# Patient Record
Sex: MALE | Race: WHITE | Employment: OTHER | URBAN - METROPOLITAN AREA
[De-identification: names, ages, dates, MRNs, and addresses within clinical notes are randomized per-mention and may not be internally consistent; named-entity substitution may affect disease eponyms.]

---

## 2019-04-06 ENCOUNTER — HOSPITAL ENCOUNTER (OUTPATIENT)
Age: 64
Setting detail: OBSERVATION
Discharge: HOME | End: 2019-04-07
Attending: EMERGENCY MEDICINE | Admitting: SURGERY
Payer: MEDICARE

## 2019-04-06 ENCOUNTER — APPOINTMENT (OUTPATIENT)
Dept: GENERAL RADIOLOGY | Age: 64
End: 2019-04-06
Payer: MEDICARE

## 2019-04-06 ENCOUNTER — APPOINTMENT (OUTPATIENT)
Dept: CT IMAGING | Age: 64
End: 2019-04-06
Payer: MEDICARE

## 2019-04-06 DIAGNOSIS — T18.108A FOREIGN BODY IN ESOPHAGUS, INITIAL ENCOUNTER: Primary | ICD-10-CM

## 2019-04-06 LAB
ALBUMIN SERPL-MCNC: 3.9 G/DL (ref 3.5–5.2)
ALP BLD-CCNC: 57 U/L (ref 40–129)
ALT SERPL-CCNC: 18 U/L (ref 0–40)
ANION GAP SERPL CALCULATED.3IONS-SCNC: 9 MMOL/L (ref 7–16)
AST SERPL-CCNC: 18 U/L (ref 0–39)
BILIRUB SERPL-MCNC: 0.6 MG/DL (ref 0–1.2)
BUN BLDV-MCNC: 43 MG/DL (ref 8–23)
CALCIUM SERPL-MCNC: 8.8 MG/DL (ref 8.6–10.2)
CHLORIDE BLD-SCNC: 102 MMOL/L (ref 98–107)
CO2: 31 MMOL/L (ref 22–29)
CREAT SERPL-MCNC: 2.2 MG/DL (ref 0.7–1.2)
GFR AFRICAN AMERICAN: 37
GFR NON-AFRICAN AMERICAN: 31 ML/MIN/1.73
GLUCOSE BLD-MCNC: 99 MG/DL (ref 74–99)
HCT VFR BLD CALC: 41 % (ref 37–54)
HEMOGLOBIN: 13.5 G/DL (ref 12.5–16.5)
MCH RBC QN AUTO: 29.7 PG (ref 26–35)
MCHC RBC AUTO-ENTMCNC: 32.9 % (ref 32–34.5)
MCV RBC AUTO: 90.1 FL (ref 80–99.9)
PDW BLD-RTO: 14.6 FL (ref 11.5–15)
PLATELET # BLD: 163 E9/L (ref 130–450)
PMV BLD AUTO: 10.9 FL (ref 7–12)
POTASSIUM SERPL-SCNC: 3.7 MMOL/L (ref 3.5–5)
RBC # BLD: 4.55 E12/L (ref 3.8–5.8)
SODIUM BLD-SCNC: 142 MMOL/L (ref 132–146)
TOTAL PROTEIN: 6.8 G/DL (ref 6.4–8.3)
WBC # BLD: 8.6 E9/L (ref 4.5–11.5)

## 2019-04-06 PROCEDURE — 80053 COMPREHEN METABOLIC PANEL: CPT

## 2019-04-06 PROCEDURE — 85027 COMPLETE CBC AUTOMATED: CPT

## 2019-04-06 PROCEDURE — 70490 CT SOFT TISSUE NECK W/O DYE: CPT

## 2019-04-06 PROCEDURE — 99285 EMERGENCY DEPT VISIT HI MDM: CPT

## 2019-04-06 PROCEDURE — 36415 COLL VENOUS BLD VENIPUNCTURE: CPT

## 2019-04-06 PROCEDURE — 70360 X-RAY EXAM OF NECK: CPT

## 2019-04-06 PROCEDURE — 6370000000 HC RX 637 (ALT 250 FOR IP): Performed by: EMERGENCY MEDICINE

## 2019-04-06 PROCEDURE — 71045 X-RAY EXAM CHEST 1 VIEW: CPT

## 2019-04-06 RX ORDER — BENAZEPRIL HYDROCHLORIDE 40 MG/1
40 TABLET, FILM COATED ORAL DAILY
COMMUNITY

## 2019-04-06 RX ORDER — INDOMETHACIN 50 MG/1
50 CAPSULE ORAL 3 TIMES DAILY PRN
COMMUNITY
End: 2021-02-23

## 2019-04-06 RX ORDER — ALLOPURINOL 100 MG/1
100 TABLET ORAL DAILY
COMMUNITY
End: 2021-02-23 | Stop reason: ALTCHOICE

## 2019-04-06 RX ORDER — POTASSIUM CHLORIDE 20 MEQ/1
20 TABLET, EXTENDED RELEASE ORAL EVERY EVENING
COMMUNITY

## 2019-04-06 RX ORDER — FUROSEMIDE 20 MG/1
20 TABLET ORAL EVERY EVENING
COMMUNITY

## 2019-04-06 RX ORDER — AMLODIPINE BESYLATE 10 MG/1
10 TABLET ORAL EVERY EVENING
COMMUNITY

## 2019-04-06 RX ORDER — DICYCLOMINE HCL 20 MG
40 TABLET ORAL 3 TIMES DAILY
COMMUNITY

## 2019-04-06 RX ORDER — TOPIRAMATE 25 MG/1
25 TABLET ORAL EVERY EVENING
COMMUNITY

## 2019-04-06 RX ORDER — GABAPENTIN 300 MG/1
300 CAPSULE ORAL 3 TIMES DAILY PRN
COMMUNITY

## 2019-04-06 RX ORDER — ACETAMINOPHEN 160 MG
1 TABLET,DISINTEGRATING ORAL EVERY EVENING
COMMUNITY

## 2019-04-06 RX ORDER — SUMATRIPTAN 25 MG/1
25 TABLET, FILM COATED ORAL 2 TIMES DAILY PRN
COMMUNITY

## 2019-04-06 RX ORDER — PROPRANOLOL HYDROCHLORIDE AND HYDROCHLOROTHIAZIDE 40; 25 MG/1; MG/1
1 TABLET ORAL EVERY EVENING
COMMUNITY
End: 2021-05-17 | Stop reason: ALTCHOICE

## 2019-04-06 RX ORDER — VITAMIN B COMPLEX
1 TABLET ORAL EVERY EVENING
COMMUNITY

## 2019-04-06 RX ADMIN — LIDOCAINE HYDROCHLORIDE: 20 SOLUTION ORAL; TOPICAL at 21:56

## 2019-04-06 SDOH — HEALTH STABILITY: MENTAL HEALTH: HOW OFTEN DO YOU HAVE A DRINK CONTAINING ALCOHOL?: NEVER

## 2019-04-06 ASSESSMENT — PAIN DESCRIPTION - DESCRIPTORS: DESCRIPTORS: SHARP

## 2019-04-06 ASSESSMENT — PAIN DESCRIPTION - LOCATION: LOCATION: THROAT

## 2019-04-06 ASSESSMENT — PAIN SCALES - GENERAL: PAINLEVEL_OUTOF10: 6

## 2019-04-06 ASSESSMENT — PAIN DESCRIPTION - FREQUENCY: FREQUENCY: INTERMITTENT

## 2019-04-06 ASSESSMENT — PAIN DESCRIPTION - PAIN TYPE: TYPE: ACUTE PAIN

## 2019-04-06 NOTE — ED PROVIDER NOTES
HPI:   Princess Wilcox is a 66 y.o. male presenting to the ED for swallowed foreign object, beginning yesterday evening. The complaint has been persistent, moderate in severity, and worsened by nothing. Pt reports that he was eating chicken yesterday and may have swallowed a bone. Since eating yesterday, he has had a difficult time swallowing. He was able to swallow his pills today but was unable to swallow a strawberry or piece of pineapple. He reports of pharyngitis. Pt denies LOC, HA, SOB, CP, back pain, neck pain, n/v/d, fever, chills, dizziness, coughing, abdominal pain or any other general complaints. ROS:   Pertinent positives and negatives are stated within HPI, all other systems reviewed and are negative.    --------------------------------------------- PAST HISTORY ---------------------------------------------  Past Medical History:  has no past medical history on file. Past Surgical History:  has no past surgical history on file. Social History:  reports that he has never smoked. He does not have any smokeless tobacco history on file. He reports that he does not drink alcohol. Family History: family history is not on file. The patients home medications have been reviewed. Allergies: Patient has no known allergies.     -------------------------------------------------- RESULTS -------------------------------------------------  All laboratory and radiology results have been personally reviewed by myself   LABS:  Results for orders placed or performed during the hospital encounter of 04/06/19   CBC   Result Value Ref Range    WBC 8.6 4.5 - 11.5 E9/L    RBC 4.55 3.80 - 5.80 E12/L    Hemoglobin 13.5 12.5 - 16.5 g/dL    Hematocrit 41.0 37.0 - 54.0 %    MCV 90.1 80.0 - 99.9 fL    MCH 29.7 26.0 - 35.0 pg    MCHC 32.9 32.0 - 34.5 %    RDW 14.6 11.5 - 15.0 fL    Platelets 398 803 - 295 E9/L    MPV 10.9 7.0 - 12.0 fL   Comprehensive Metabolic Panel   Result Value Ref Range    Sodium 142 132 - 146 mmol/L    Potassium 3.7 3.5 - 5.0 mmol/L    Chloride 102 98 - 107 mmol/L    CO2 31 (H) 22 - 29 mmol/L    Anion Gap 9 7 - 16 mmol/L    Glucose 99 74 - 99 mg/dL    BUN 43 (H) 8 - 23 mg/dL    CREATININE 2.2 (H) 0.7 - 1.2 mg/dL    GFR Non-African American 31 >=60 mL/min/1.73    GFR African American 37     Calcium 8.8 8.6 - 10.2 mg/dL    Total Protein 6.8 6.4 - 8.3 g/dL    Alb 3.9 3.5 - 5.2 g/dL    Total Bilirubin 0.6 0.0 - 1.2 mg/dL    Alkaline Phosphatase 57 40 - 129 U/L    ALT 18 0 - 40 U/L    AST 18 0 - 39 U/L       RADIOLOGY:  Interpreted by Radiologist.  CT SOFT TISSUE NECK WO CONTRAST   Final Result   1. Presence of 2.7 cm x 0.1 millimeters   linear bone density representing a foreign body transverse oriented in   the cervical esophagus. Preliminary report given to Dr. Kami Elam ER Physician at the time of   the present study. ABNORMAL REPORT. XR Neck Soft Tissue   Final Result   Unremarkable x-ray series of the soft tissues of the neck. XR CHEST PORTABLE   Final Result   No acute cardiopulmonary process. ------------------------- NURSING NOTES AND VITALS REVIEWED ---------------------------   The nursing notes within the ED encounter and vital signs as below have been reviewed. /83   Pulse 71   Temp 97.9 °F (36.6 °C) (Temporal)   Resp 16   Ht 5' 7\" (1.702 m)   Wt 280 lb (127 kg)   SpO2 96%   BMI 43.85 kg/m²   Oxygen Saturation Interpretation: Normal    ---------------------------------------------------PHYSICAL EXAM--------------------------------------    Constitutional/General: Alert and oriented x3, Mild distress  Eyes: PERRL, EOMI  Mouth: Oropharynx clear, handling secretions, no trismus  Neck: Supple, full ROM,   Pulmonary: Lungs clear to auscultation bilaterally, no wheezes, rales, or rhonchi. Not in respiratory distress  Cardiovascular:  Regular rate and rhythm, no murmurs, gallops, or rubs.  2+ distal pulses  Abdomen: Soft, non tender, non distended, Extremities: Moves all extremities x 4. Warm and well perfused  Skin: warm and dry  Neurologic: GCS 15,  Psych: Normal Affect      ------------------------------ ED COURSE/MEDICAL DECISION MAKING----------------------  Medications   aluminum & magnesium hydroxide-simethicone (MAALOX) 30 mL, lidocaine viscous (XYLOCAINE) 5 mL (GI COCKTAIL) ( Oral Given 4/6/19 2156)   morphine (PF) injection 2 mg (2 mg Intravenous Given 4/7/19 0022)   ondansetron (ZOFRAN) injection 4 mg (4 mg Intravenous Given 4/7/19 0022)       Medical Decision Making:     medicated and still symptomatic and CT ordered and reviewed the CT reviewed with patient and discussed with general surgery patient will be admitted    Counseling: The emergency provider has spoken with the patient and discussed todays results, in addition to providing specific details for the plan of care and counseling regarding the diagnosis and prognosis. Questions are answered at this time and they are agreeable with the plan.      --------------------------------- IMPRESSION AND DISPOSITION ---------------------------------    IMPRESSION  1. Foreign body in esophagus, initial encounter        DISPOSITION  Disposition: Admit to med/surg floor  Patient condition is stable    4/6/19, 7:39 PM.    This note is prepared by Lyn Sawyer, acting as Scribe for Laurie Mcdonnell MD.    Laurie Mcdonnell MD:  The scribe's documentation has been prepared under my direction and personally reviewed by me in its entirety. I confirm that the note above accurately reflects all work, treatment, procedures, and medical decision making performed by me.          Laurie Mcdonnell MD  04/07/19 [id number]

## 2019-04-07 ENCOUNTER — ANESTHESIA EVENT (OUTPATIENT)
Dept: OPERATING ROOM | Age: 64
End: 2019-04-07
Payer: MEDICARE

## 2019-04-07 ENCOUNTER — ANESTHESIA (OUTPATIENT)
Dept: OPERATING ROOM | Age: 64
End: 2019-04-07
Payer: MEDICARE

## 2019-04-07 VITALS
RESPIRATION RATE: 25 BRPM | SYSTOLIC BLOOD PRESSURE: 185 MMHG | OXYGEN SATURATION: 97 % | DIASTOLIC BLOOD PRESSURE: 123 MMHG

## 2019-04-07 VITALS
DIASTOLIC BLOOD PRESSURE: 86 MMHG | SYSTOLIC BLOOD PRESSURE: 139 MMHG | RESPIRATION RATE: 16 BRPM | HEIGHT: 67 IN | BODY MASS INDEX: 43.95 KG/M2 | HEART RATE: 84 BPM | TEMPERATURE: 99 F | WEIGHT: 280 LBS | OXYGEN SATURATION: 92 %

## 2019-04-07 PROBLEM — T18.108A FOREIGN BODY IN ESOPHAGUS: Status: ACTIVE | Noted: 2019-04-07

## 2019-04-07 LAB
ALBUMIN SERPL-MCNC: 3.9 G/DL (ref 3.5–5.2)
ALP BLD-CCNC: 85 U/L (ref 40–129)
ALT SERPL-CCNC: 34 U/L (ref 0–40)
ANION GAP SERPL CALCULATED.3IONS-SCNC: 13 MMOL/L (ref 7–16)
AST SERPL-CCNC: 46 U/L (ref 0–39)
BASOPHILS ABSOLUTE: 0.05 E9/L (ref 0–0.2)
BASOPHILS RELATIVE PERCENT: 0.5 % (ref 0–2)
BILIRUB SERPL-MCNC: 0.9 MG/DL (ref 0–1.2)
BUN BLDV-MCNC: 38 MG/DL (ref 8–23)
CALCIUM SERPL-MCNC: 9.1 MG/DL (ref 8.6–10.2)
CHLORIDE BLD-SCNC: 102 MMOL/L (ref 98–107)
CO2: 28 MMOL/L (ref 22–29)
CREAT SERPL-MCNC: 2 MG/DL (ref 0.7–1.2)
EOSINOPHILS ABSOLUTE: 0.12 E9/L (ref 0.05–0.5)
EOSINOPHILS RELATIVE PERCENT: 1.1 % (ref 0–6)
GFR AFRICAN AMERICAN: 41
GFR NON-AFRICAN AMERICAN: 34 ML/MIN/1.73
GLUCOSE BLD-MCNC: 99 MG/DL (ref 74–99)
HCT VFR BLD CALC: 41.2 % (ref 37–54)
HEMOGLOBIN: 13.3 G/DL (ref 12.5–16.5)
IMMATURE GRANULOCYTES #: 0.04 E9/L
IMMATURE GRANULOCYTES %: 0.4 % (ref 0–5)
LYMPHOCYTES ABSOLUTE: 0.88 E9/L (ref 1.5–4)
LYMPHOCYTES RELATIVE PERCENT: 8.4 % (ref 20–42)
MCH RBC QN AUTO: 29.3 PG (ref 26–35)
MCHC RBC AUTO-ENTMCNC: 32.3 % (ref 32–34.5)
MCV RBC AUTO: 90.7 FL (ref 80–99.9)
MONOCYTES ABSOLUTE: 0.77 E9/L (ref 0.1–0.95)
MONOCYTES RELATIVE PERCENT: 7.3 % (ref 2–12)
NEUTROPHILS ABSOLUTE: 8.67 E9/L (ref 1.8–7.3)
NEUTROPHILS RELATIVE PERCENT: 82.3 % (ref 43–80)
PDW BLD-RTO: 14.5 FL (ref 11.5–15)
PLATELET # BLD: 157 E9/L (ref 130–450)
PMV BLD AUTO: 11.6 FL (ref 7–12)
POTASSIUM REFLEX MAGNESIUM: 4 MMOL/L (ref 3.5–5)
RBC # BLD: 4.54 E12/L (ref 3.8–5.8)
SODIUM BLD-SCNC: 143 MMOL/L (ref 132–146)
TOTAL PROTEIN: 6.7 G/DL (ref 6.4–8.3)
WBC # BLD: 10.5 E9/L (ref 4.5–11.5)

## 2019-04-07 PROCEDURE — 2580000003 HC RX 258: Performed by: STUDENT IN AN ORGANIZED HEALTH CARE EDUCATION/TRAINING PROGRAM

## 2019-04-07 PROCEDURE — 3600007501: Performed by: SURGERY

## 2019-04-07 PROCEDURE — G0378 HOSPITAL OBSERVATION PER HR: HCPCS

## 2019-04-07 PROCEDURE — 80053 COMPREHEN METABOLIC PANEL: CPT

## 2019-04-07 PROCEDURE — 3700000000 HC ANESTHESIA ATTENDED CARE: Performed by: SURGERY

## 2019-04-07 PROCEDURE — 96374 THER/PROPH/DIAG INJ IV PUSH: CPT

## 2019-04-07 PROCEDURE — 6360000002 HC RX W HCPCS: Performed by: SURGERY

## 2019-04-07 PROCEDURE — 36415 COLL VENOUS BLD VENIPUNCTURE: CPT

## 2019-04-07 PROCEDURE — 6360000002 HC RX W HCPCS

## 2019-04-07 PROCEDURE — 2709999900 HC NON-CHARGEABLE SUPPLY: Performed by: SURGERY

## 2019-04-07 PROCEDURE — 6360000002 HC RX W HCPCS: Performed by: NURSE ANESTHETIST, CERTIFIED REGISTERED

## 2019-04-07 PROCEDURE — 96375 TX/PRO/DX INJ NEW DRUG ADDON: CPT

## 2019-04-07 PROCEDURE — 3600007511: Performed by: SURGERY

## 2019-04-07 PROCEDURE — 85025 COMPLETE CBC W/AUTO DIFF WBC: CPT

## 2019-04-07 PROCEDURE — 6360000002 HC RX W HCPCS: Performed by: EMERGENCY MEDICINE

## 2019-04-07 PROCEDURE — 7100000000 HC PACU RECOVERY - FIRST 15 MIN: Performed by: SURGERY

## 2019-04-07 PROCEDURE — 3700000001 HC ADD 15 MINUTES (ANESTHESIA): Performed by: SURGERY

## 2019-04-07 PROCEDURE — 2500000003 HC RX 250 WO HCPCS: Performed by: NURSE ANESTHETIST, CERTIFIED REGISTERED

## 2019-04-07 PROCEDURE — 2580000003 HC RX 258: Performed by: SURGERY

## 2019-04-07 PROCEDURE — 88300 SURGICAL PATH GROSS: CPT

## 2019-04-07 PROCEDURE — 7100000001 HC PACU RECOVERY - ADDTL 15 MIN: Performed by: SURGERY

## 2019-04-07 RX ORDER — DICYCLOMINE HYDROCHLORIDE 10 MG/ML
20 INJECTION INTRAMUSCULAR ONCE
Status: COMPLETED | OUTPATIENT
Start: 2019-04-07 | End: 2019-04-07

## 2019-04-07 RX ORDER — PROPOFOL 10 MG/ML
INJECTION, EMULSION INTRAVENOUS PRN
Status: DISCONTINUED | OUTPATIENT
Start: 2019-04-07 | End: 2019-04-07 | Stop reason: SDUPTHER

## 2019-04-07 RX ORDER — ONDANSETRON 2 MG/ML
INJECTION INTRAMUSCULAR; INTRAVENOUS
Status: COMPLETED
Start: 2019-04-07 | End: 2019-04-07

## 2019-04-07 RX ORDER — SODIUM CHLORIDE, SODIUM LACTATE, POTASSIUM CHLORIDE, CALCIUM CHLORIDE 600; 310; 30; 20 MG/100ML; MG/100ML; MG/100ML; MG/100ML
INJECTION, SOLUTION INTRAVENOUS CONTINUOUS
Status: DISCONTINUED | OUTPATIENT
Start: 2019-04-07 | End: 2019-04-07

## 2019-04-07 RX ORDER — MORPHINE SULFATE 2 MG/ML
2 INJECTION, SOLUTION INTRAMUSCULAR; INTRAVENOUS ONCE
Status: COMPLETED | OUTPATIENT
Start: 2019-04-07 | End: 2019-04-07

## 2019-04-07 RX ORDER — ONDANSETRON 2 MG/ML
4 INJECTION INTRAMUSCULAR; INTRAVENOUS ONCE
Status: COMPLETED | OUTPATIENT
Start: 2019-04-07 | End: 2019-04-07

## 2019-04-07 RX ORDER — SODIUM CHLORIDE 0.9 % (FLUSH) 0.9 %
10 SYRINGE (ML) INJECTION PRN
Status: DISCONTINUED | OUTPATIENT
Start: 2019-04-07 | End: 2019-04-07 | Stop reason: HOSPADM

## 2019-04-07 RX ORDER — LABETALOL HYDROCHLORIDE 5 MG/ML
5 INJECTION, SOLUTION INTRAVENOUS EVERY 10 MIN PRN
Status: DISCONTINUED | OUTPATIENT
Start: 2019-04-07 | End: 2019-04-07 | Stop reason: HOSPADM

## 2019-04-07 RX ORDER — MORPHINE SULFATE 2 MG/ML
2 INJECTION, SOLUTION INTRAMUSCULAR; INTRAVENOUS EVERY 5 MIN PRN
Status: DISCONTINUED | OUTPATIENT
Start: 2019-04-07 | End: 2019-04-07 | Stop reason: HOSPADM

## 2019-04-07 RX ORDER — ACETAMINOPHEN 325 MG/1
650 TABLET ORAL EVERY 4 HOURS PRN
Status: DISCONTINUED | OUTPATIENT
Start: 2019-04-07 | End: 2019-04-07 | Stop reason: HOSPADM

## 2019-04-07 RX ORDER — MEPERIDINE HYDROCHLORIDE 50 MG/ML
12.5 INJECTION INTRAMUSCULAR; INTRAVENOUS; SUBCUTANEOUS EVERY 5 MIN PRN
Status: DISCONTINUED | OUTPATIENT
Start: 2019-04-07 | End: 2019-04-07 | Stop reason: HOSPADM

## 2019-04-07 RX ORDER — ROCURONIUM BROMIDE 10 MG/ML
INJECTION, SOLUTION INTRAVENOUS PRN
Status: DISCONTINUED | OUTPATIENT
Start: 2019-04-07 | End: 2019-04-07 | Stop reason: SDUPTHER

## 2019-04-07 RX ORDER — DEXAMETHASONE SODIUM PHOSPHATE 10 MG/ML
INJECTION INTRAMUSCULAR; INTRAVENOUS PRN
Status: DISCONTINUED | OUTPATIENT
Start: 2019-04-07 | End: 2019-04-07 | Stop reason: SDUPTHER

## 2019-04-07 RX ORDER — ONDANSETRON 2 MG/ML
INJECTION INTRAMUSCULAR; INTRAVENOUS PRN
Status: DISCONTINUED | OUTPATIENT
Start: 2019-04-07 | End: 2019-04-07 | Stop reason: SDUPTHER

## 2019-04-07 RX ORDER — DICYCLOMINE HYDROCHLORIDE 10 MG/ML
20 INJECTION INTRAMUSCULAR 4 TIMES DAILY
Status: DISCONTINUED | OUTPATIENT
Start: 2019-04-07 | End: 2019-04-07 | Stop reason: HOSPADM

## 2019-04-07 RX ORDER — SODIUM CHLORIDE 0.9 % (FLUSH) 0.9 %
10 SYRINGE (ML) INJECTION EVERY 12 HOURS SCHEDULED
Status: DISCONTINUED | OUTPATIENT
Start: 2019-04-07 | End: 2019-04-07 | Stop reason: HOSPADM

## 2019-04-07 RX ORDER — SUCCINYLCHOLINE/SOD CL,ISO/PF 200MG/10ML
SYRINGE (ML) INTRAVENOUS PRN
Status: DISCONTINUED | OUTPATIENT
Start: 2019-04-07 | End: 2019-04-07 | Stop reason: SDUPTHER

## 2019-04-07 RX ORDER — MORPHINE SULFATE 2 MG/ML
INJECTION, SOLUTION INTRAMUSCULAR; INTRAVENOUS
Status: COMPLETED
Start: 2019-04-07 | End: 2019-04-07

## 2019-04-07 RX ORDER — HYDRALAZINE HYDROCHLORIDE 20 MG/ML
5 INJECTION INTRAMUSCULAR; INTRAVENOUS EVERY 10 MIN PRN
Status: DISCONTINUED | OUTPATIENT
Start: 2019-04-07 | End: 2019-04-07 | Stop reason: HOSPADM

## 2019-04-07 RX ORDER — ONDANSETRON 2 MG/ML
4 INJECTION INTRAMUSCULAR; INTRAVENOUS EVERY 6 HOURS PRN
Status: DISCONTINUED | OUTPATIENT
Start: 2019-04-07 | End: 2019-04-07 | Stop reason: HOSPADM

## 2019-04-07 RX ORDER — PROMETHAZINE HYDROCHLORIDE 25 MG/ML
6.25 INJECTION, SOLUTION INTRAMUSCULAR; INTRAVENOUS
Status: DISCONTINUED | OUTPATIENT
Start: 2019-04-07 | End: 2019-04-07 | Stop reason: HOSPADM

## 2019-04-07 RX ORDER — MIDAZOLAM HYDROCHLORIDE 1 MG/ML
INJECTION INTRAMUSCULAR; INTRAVENOUS PRN
Status: DISCONTINUED | OUTPATIENT
Start: 2019-04-07 | End: 2019-04-07 | Stop reason: SDUPTHER

## 2019-04-07 RX ADMIN — MIDAZOLAM HYDROCHLORIDE 2 MG: 1 INJECTION, SOLUTION INTRAMUSCULAR; INTRAVENOUS at 07:44

## 2019-04-07 RX ADMIN — Medication 10 ML: at 09:46

## 2019-04-07 RX ADMIN — ONDANSETRON HYDROCHLORIDE 4 MG: 2 SOLUTION INTRAMUSCULAR; INTRAVENOUS at 00:22

## 2019-04-07 RX ADMIN — ONDANSETRON 4 MG: 2 INJECTION INTRAMUSCULAR; INTRAVENOUS at 00:22

## 2019-04-07 RX ADMIN — DICYCLOMINE HYDROCHLORIDE 20 MG: 20 INJECTION, SOLUTION INTRAMUSCULAR at 14:12

## 2019-04-07 RX ADMIN — DICYCLOMINE HYDROCHLORIDE 20 MG: 20 INJECTION, SOLUTION INTRAMUSCULAR at 01:21

## 2019-04-07 RX ADMIN — MORPHINE SULFATE 2 MG: 2 INJECTION, SOLUTION INTRAMUSCULAR; INTRAVENOUS at 00:22

## 2019-04-07 RX ADMIN — LIDOCAINE HYDROCHLORIDE 100 MG: 20 INJECTION, SOLUTION INTRAVENOUS at 07:44

## 2019-04-07 RX ADMIN — ONDANSETRON HYDROCHLORIDE 4 MG: 2 INJECTION, SOLUTION INTRAMUSCULAR; INTRAVENOUS at 07:44

## 2019-04-07 RX ADMIN — ROCURONIUM BROMIDE 5 MG: 10 INJECTION, SOLUTION INTRAVENOUS at 07:44

## 2019-04-07 RX ADMIN — PROPOFOL 200 MG: 10 INJECTION, EMULSION INTRAVENOUS at 07:44

## 2019-04-07 RX ADMIN — SODIUM CHLORIDE, POTASSIUM CHLORIDE, SODIUM LACTATE AND CALCIUM CHLORIDE: 600; 310; 30; 20 INJECTION, SOLUTION INTRAVENOUS at 02:31

## 2019-04-07 RX ADMIN — DICYCLOMINE HYDROCHLORIDE 20 MG: 20 INJECTION, SOLUTION INTRAMUSCULAR at 09:45

## 2019-04-07 RX ADMIN — Medication 140 MG: at 07:44

## 2019-04-07 RX ADMIN — DEXAMETHASONE SODIUM PHOSPHATE 10 MG: 10 INJECTION INTRAMUSCULAR; INTRAVENOUS at 07:44

## 2019-04-07 ASSESSMENT — PAIN SCALES - GENERAL
PAINLEVEL_OUTOF10: 0
PAINLEVEL_OUTOF10: 4
PAINLEVEL_OUTOF10: 6
PAINLEVEL_OUTOF10: 0

## 2019-04-07 ASSESSMENT — PULMONARY FUNCTION TESTS
PIF_VALUE: 30
PIF_VALUE: 31
PIF_VALUE: 1
PIF_VALUE: 22
PIF_VALUE: 25
PIF_VALUE: 26
PIF_VALUE: 3
PIF_VALUE: 0
PIF_VALUE: 3
PIF_VALUE: 41
PIF_VALUE: 1
PIF_VALUE: 11
PIF_VALUE: 21
PIF_VALUE: 18
PIF_VALUE: 23
PIF_VALUE: 3
PIF_VALUE: 3
PIF_VALUE: 24
PIF_VALUE: 2
PIF_VALUE: 42
PIF_VALUE: 2

## 2019-04-07 NOTE — PLAN OF CARE
Problem: Pain:  Goal: Pain level will decrease  Description  Pain level will decrease  Outcome: Met This Shift  Goal: Control of acute pain  Description  Control of acute pain  Outcome: Met This Shift  Goal: Control of chronic pain  Description  Control of chronic pain  Outcome: Met This Shift     Problem: Falls - Risk of:  Goal: Will remain free from falls  Description  Will remain free from falls  Outcome: Met This Shift  Goal: Absence of physical injury  Description  Absence of physical injury  Outcome: Met This Shift

## 2019-04-07 NOTE — OP NOTE
Tuan Wyatt  YOB: 1955  [id number]    Pre-operative Diagnosis: esophageal foreign body    Post-operative Diagnosis: same    Procedure: EGD with removal foreign body    Anesthesia: JOANN    Surgeon: Adria Cooper MD    Assistant: None    Estimated Blood Loss: minimal    Complications: none    Specimens: none    Procedure:  Pt was taken to the OR and placed on the endoscopy table in a left lateral decubitus position. General anesthesia was administered and a bite block was inserted. A lubricated gastroscope was inserted into the oropharynx and advanced into the esophagus. In the proximal cervical esophagus was a chicken bone. The end of the bone was grasped with a rat tooth forceps, and the scope was retrieved along with the foreign body. The scope was reinserted. No other pathology was noted in the esophagus, stomach or duodenum. No crepitus could be palpated in the cervical soft tissue. The scope was removed. The patient tolerated the procedure well.     Impression: successful removal bony foreign body    Plan:start PO, discharge if tolerates      Collette Ruvalcaba MD

## 2019-04-07 NOTE — ED NOTES
Pt state he came to ER because he thinks that he has a chicken bone stuck in his throat. Pt is currently able to swallow. No difficulty breathing, no drooling noted. Pt states pain is minimal, but increases in intensity when he swallows. Respirations are currently easy and unlabored. No signs of distress noted at this time. Family is at bedside. Will continue to monitor.      Vielka Hansen RN  04/07/19 [id number]

## 2019-04-07 NOTE — ANESTHESIA PRE PROCEDURE
Department of Anesthesiology  Preprocedure Note       Name:  Khloe Liu   Age:  66 y.o.  :  1955                                          MRN:  [id number]         Date:  2019      Surgeon: Ethel Seay):  Cinthya Hairston MD    Procedure: EGD FOREIGN BODY REMOVAL (N/A )    Medications prior to admission:   Prior to Admission medications    Medication Sig Start Date End Date Taking? Authorizing Provider   dicyclomine (BENTYL) 20 MG tablet Take 20 mg by mouth every 4 hours as needed   Yes Historical Provider, MD   gabapentin (NEURONTIN) 300 MG capsule Take 300 mg by mouth 4 times daily.    Yes Historical Provider, MD   propranolol-hydrochlorothiazide (INDERIDE) 40-25 MG per tablet Take 1 tablet by mouth daily   Yes Historical Provider, MD   amLODIPine (NORVASC) 10 MG tablet Take 10 mg by mouth daily   Yes Historical Provider, MD   benazepril (LOTENSIN) 40 MG tablet Take 40 mg by mouth daily   Yes Historical Provider, MD   furosemide (LASIX) 20 MG tablet Take 20 mg by mouth daily   Yes Historical Provider, MD   topiramate (TOPAMAX) 25 MG tablet Take 25 mg by mouth daily   Yes Historical Provider, MD   SUMAtriptan (IMITREX) 25 MG tablet Take 25 mg by mouth 2 times daily as needed for Migraine   Yes Historical Provider, MD   allopurinol (ZYLOPRIM) 100 MG tablet Take 100 mg by mouth daily   Yes Historical Provider, MD   indomethacin (INDOCIN) 50 MG capsule Take 50 mg by mouth 3 times daily as needed for Pain   Yes Historical Provider, MD   potassium chloride (KLOR-CON M) 20 MEQ extended release tablet Take 20 mEq by mouth daily   Yes Historical Provider, MD   Cholecalciferol (VITAMIN D3) 2000 units CAPS Take 1 capsule by mouth daily   Yes Historical Provider, MD   Coenzyme Q10 (COQ10) 100 MG CAPS Take 1 capsule by mouth daily   Yes Historical Provider, MD       Current medications:    Current Facility-Administered Medications   Medication Dose Route Frequency Provider Last Rate Last Dose    sodium chloride flush 0.9 % injection 10 mL  10 mL Intravenous 2 times per day Jonathan Dash MD        sodium chloride flush 0.9 % injection 10 mL  10 mL Intravenous PRN Jonathan Dash MD        acetaminophen (TYLENOL) tablet 650 mg  650 mg Oral Q4H PRN Jonathan Dash MD        enoxaparin (LOVENOX) injection 40 mg  40 mg Subcutaneous Daily Jonathan Dash MD        lactated ringers infusion   Intravenous Continuous Jonathan Dash  mL/hr at 04/07/19 0231      HYDROmorphone (DILAUDID) injection 0.5 mg  0.5 mg Intravenous Q3H PRN Jonathan Dash MD        [facility]ron [facility] injection 4 mg  4 mg Intravenous Q6H PRN Jonathan Dash MD        dicyclomine (BENTYL) injection 20 mg  20 mg Intramuscular 4x Daily Jonathan Dash MD           Allergies:  No Known Allergies    Problem List:    Patient Active Problem List   Diagnosis Code    Foreign body in esophagus T18.108A       Past Medical History:  History reviewed. No pertinent past medical history. Past Surgical History:  History reviewed. No pertinent surgical history.     Social History:    Social History     Tobacco Use    Smoking status: Never Smoker   Substance Use Topics    Alcohol use: Never     Frequency: Never                                Counseling given: Not Answered      Vital Signs (Current):   Vitals:    04/06/19 1938 04/07/19 0032 04/07/19 0200   BP: (!) 155/96 121/83 138/74   Pulse: 79 71 80   Resp: 18 16 18   Temp: 36.6 °C (97.9 °F) 36.7 °C (98 °F) 36.8 °C (98.2 °F)   TempSrc: Temporal  Temporal   SpO2: 98% 96% 96%   Weight: 280 lb (127 kg)     Height: 5' 7\" (1.702 m)                                                BP Readings from Last 3 Encounters:   04/07/19 138/74       NPO Status: Time of last liquid consumption: 2300                        Time of last solid consumption: 2300                        Date of last liquid consumption: 04/06/19                        Date of last solid food consumption: 04/06/19    BMI:   Wt Readings from Last 3 Encounters:   04/06/19 280 lb (127 kg)     Body mass index is 43.85 kg/m². CBC:   Lab Results   Component Value Date    WBC 8.6 04/06/2019    RBC 4.55 04/06/2019    HGB 13.5 04/06/2019    HCT 41.0 04/06/2019    MCV 90.1 04/06/2019    RDW 14.6 04/06/2019     04/06/2019       CMP:   Lab Results   Component Value Date     04/06/2019    K 3.7 04/06/2019     04/06/2019    CO2 31 04/06/2019    BUN 43 04/06/2019    CREATININE 2.2 04/06/2019    GFRAA 37 04/06/2019    LABGLOM 31 04/06/2019    GLUCOSE 99 04/06/2019    PROT 6.8 04/06/2019    CALCIUM 8.8 04/06/2019    BILITOT 0.6 04/06/2019    ALKPHOS 57 04/06/2019    AST 18 04/06/2019    ALT 18 04/06/2019       POC Tests: No results for input(s): POCGLU, POCNA, POCK, POCCL, POCBUN, POCHEMO, POCHCT in the last 72 hours. Coags: No results found for: PROTIME, INR, APTT    HCG (If Applicable): No results found for: PREGTESTUR, PREGSERUM, HCG, HCGQUANT     ABGs: No results found for: PHART, PO2ART, WGR6ZLZ, QTZ2STQ, BEART, H6BGRJVJ     Type & Screen (If Applicable):  No results found for: LABABO, LABRH     CT Soft tissue neck 4/6/2019  Impression   1. Presence of 2.7 cm x 0.1 millimeters   linear bone density representing a foreign body transverse oriented in   the cervical esophagus.       Preliminary report given to Dr. Pauly Delgado ER Physician at the time of   the present study.       ABNORMAL REPORT. XR Chest 4/6/2019      Impression   No acute cardiopulmonary process.          Anesthesia Evaluation  Patient summary reviewed and Nursing notes reviewed no history of anesthetic complications:   Airway: Mallampati: II  TM distance: <3 FB   Neck ROM: full  Mouth opening: > = 3 FB Dental:      Comment: Pt denies any missing, chipped, loose, or cracked teeth    Pulmonary:normal exam  breath sounds clear to auscultation  (+) sleep apnea: on noncompliant,                             Cardiovascular:    (+) hypertension:,         Rhythm: regular  Rate: normal           Beta Blocker:  Dose within 24 Hrs         Neuro/Psych:   (+) headaches (pt takes PRN imitrex): migraine headaches,             GI/Hepatic/Renal:   (+) renal disease (pt states was told has mild kidney disease):,          ROS comment: Foreign body in esophagus. Endo/Other: Negative Endo/Other ROS             Pt had no PAT visit       Abdominal:           Vascular: negative vascular ROS. Anesthesia Plan      general     ASA 3       Induction: intravenous. MIPS: Prophylactic antiemetics administered and Postoperative trial extubation. Anesthetic plan and risks discussed with patient. Use of blood products discussed with patient whom consented to blood products. Plan discussed with CRNA and attending. Josafat Mccloud RN , [facility]  4/7/2019      DOS STAFF ADDENDUM:    Patient seen and chart reviewed. Physical exam and history updated as indicated. NPO status confirmed. Anesthesia options and plan discussed including risks benefits with patient/legal guardian and family as available. Concerns and questions addressed. Consent verbalized to proceed.   Anesthesia plan, options and intraoperative/postoperative concerns discussed with care team.    Kelsi Evans MD  4/7/2019  7:40 AM

## 2019-04-07 NOTE — ED NOTES
Patient states gi cocktail helped slightly, but he is now still feeling pain in throat.       Jodie Chávez  04/06/19 8654

## 2019-04-07 NOTE — ANESTHESIA POSTPROCEDURE EVALUATION
Department of Anesthesiology  Postprocedure Note    Patient: Rajani Chong  MRN: [id number]  YOB: 1955  Date of evaluation: 4/7/2019  Time:  9:01 AM     Procedure Summary     Date:  04/07/19 Room / Location:  [address]    Anesthesia Start:  9081 Anesthesia Stop:  Claude Na    Procedure:  EGD FOREIGN BODY REMOVAL (N/A ) Diagnosis:  (FOREIGN BODY)    Surgeon:  Haily Lujan MD Responsible Provider:  Kaden Green MD    Anesthesia Type:  general ASA Status:  3          Anesthesia Type: general    Edgar Phase I: Edgar Score: 10    Edgar Phase II:      Last vitals: Reviewed and per EMR flowsheets.        Anesthesia Post Evaluation    Patient location during evaluation: PACU  Patient participation: complete - patient participated  Level of consciousness: awake and alert  Airway patency: patent  Nausea & Vomiting: no nausea and no vomiting  Complications: no  Cardiovascular status: hemodynamically stable and blood pressure returned to baseline  Respiratory status: acceptable  Hydration status: euvolemic

## 2019-04-07 NOTE — H&P
capsule Take 50 mg by mouth 3 times daily as needed for Pain   Yes Historical Provider, MD   potassium chloride (KLOR-CON M) 20 MEQ extended release tablet Take 20 mEq by mouth daily   Yes Historical Provider, MD   Cholecalciferol (VITAMIN D3) 2000 units CAPS Take 1 capsule by mouth daily   Yes Historical Provider, MD   Coenzyme Q10 (COQ10) 100 MG CAPS Take 1 capsule by mouth daily   Yes Historical Provider, MD       No Known Allergies    History reviewed. No pertinent family history. Social History     Tobacco Use    Smoking status: Never Smoker   Substance Use Topics    Alcohol use: Never     Frequency: Never    Drug use: Not on file         Review of Systems: Please see HPI. PHYSICAL EXAM:    Vitals:    19 0032   BP: 121/83   Pulse: 71   Resp: 16   Temp:    SpO2: 96%       General Appearance:  awake, alert, oriented, in no acute distress  Skin:  Skin color, texture, turgor normal. No rashes or lesions. Head/face:  NCAT  Eyes:  No gross abnormalities. Lungs:  No increased work of breathing   Heart:  RR  Abdomen:  Soft, non-tender, obese  Extremities: Extremities warm to touch, pink, with no edema. LABS:  CBC  Recent Labs     19   WBC 8.6   HGB 13.5   HCT 41.0        BMP  Recent Labs     19      K 3.7      CO2 31*   BUN 43*   CREATININE 2.2*   CALCIUM 8.8     Liver Function  Recent Labs     19   BILITOT 0.6   AST 18   ALT 18   ALKPHOS 57   PROT 6.8   LABALBU 3.9     No results for input(s): LACTATE in the last 72 hours. No results for input(s): INR, PTT in the last 72 hours. Invalid input(s): PT    RADIOLOGY  Xr Neck Soft Tissue    Result Date: 2019  Patient MRN:  [id number] : 1955 Age: 66 years Gender: Male Order Date:  2019 7:45 PM TECHNIQUE/NUMBER OF IMAGES/COMPARISON/CLINICAL HISTORY: X-ray series of the soft tissues of the neck. Frontal and lateral views. Unable to swallow. Food stuck.  FINDINGS: There are preserved airway for the nasal/stacey/hypopharynx, larynx including supraglotic segment and visualized upper cervical trachea. There is no prevertebral soft tissue swelling. There is normal thickening for the epiglottis. Discrete calcifications are seen in the area of the left carotid bulbar region projection. There are no subcutaneous emphysema. Unremarkable x-ray series of the soft tissues of the neck. Ct Soft Tissue Neck Wo Contrast    Result Date: 2019  Patient MRN:  [id number] : 1955 Age: 66 years Gender: Male Order Date:  2019 11:00 PM TECHNIQUE/NUMBER OF IMAGES/COMPARISON/CLINICAL HISTORY: CT scan soft tissues of the neck. Axial images were obtained with sagittal and coronal reconstructions are. Clinical history is a small infiltrate body is some chicken. FINDINGS: There is no indication for an air in the soft tissues in the neck including the paraesophageal/para laryngeal/para pharyngeal spaces are. Motion artifacts are present which causes streaking artifacts at the level of the stacey/hypopharynx and late drinks. No conspicuous radiopaque foreign body is seen in the pharynx or in the late drinks or in the cervical trachea lumen. Noted however at the level of the upper cervical esophagus below the level of pharyngoesophageal sphincter, in the topographic level of C7, is the presence of a perpendicular oriented linear bone like structure that n represents a foramen body in the  cervical esophagus, measuring 2.7 cm in length by 0.1 mm. No air seen in the soft tissues. There is no fluid accumulation around the cervical esophagus. Lung apices demonstrate no significant findings. 1. Presence of 2.7 cm x 0.1 millimeters linear bone density representing a foreign body transverse oriented in the cervical esophagus. Preliminary report given to Dr. Lico Reyes  Physician at the time of the present study. ABNORMAL REPORT.     Xr Chest Portable    Result Date: 2019  Patient MRN:  [id number] : 1955 Age: 66 years Gender: Male Order Date:  4/6/2019 7:45 PM TECHNIQUE/NUMBER OF IMAGES/COMPARISON/CLINICAL HISTORY: Chest AP 1 image one view History of food stuck in the throat. FINDINGS: There is no air in the soft tissues of the neck lower segment. There is no pneumomediastinum or subcutaneous emphysema or pneumothorax. Patient right diaphragma is an old finding. Lungs are normally expanded. No infiltrates, consolidation perfusion seen. Heart has normal size, mediastinum. No acute cardiopulmonary process.         ASSESSMENT:  66 y.o. male with esophogeal foreign body likely bone     PLAN:  Will perform EGD in the AM   NPO  IVF  Pain and nausea control  D/w Dr. Darryle Heaps    Electronically signed by Ernie Mccall MD on 4/7/19 at 1:15 AM

## 2019-04-07 NOTE — ED NOTES
Pt called to this nurse from hammer bed. States that administration of morphine may have aggravated his ongoing GI issues. Requesting Bentyl but aware that he is to be NPO until evaluated by surgical resident.  made aware. Orders to be placed.      Jazz Bergman RN  04/07/19 [id number]

## 2019-04-07 NOTE — DISCHARGE SUMMARY
Patient ID:  Merlinda Seltzer  [id number]  66 y.o.  1955    Admit date: 4/6/2019    Discharge date and time:  4/7/2019 1:34 PM      Admitting Physician: Love Zarate MD     Discharge Physician: Love Zarate     Admission Diagnoses:   Foreign body in esophagus, initial encounter [T18.108A]    Discharged Condition: stable    Hospital Course: admitted with foreign body in esophagus; endoscopically removed without complications    Disposition: home    Discharge Medications:     Medication List      CONTINUE taking these medications    allopurinol 100 MG tablet  Commonly known as:  ZYLOPRIM     amLODIPine 10 MG tablet  Commonly known as:  NORVASC     benazepril 40 MG tablet  Commonly known as:  LOTENSIN     CoQ10 100 MG Caps     dicyclomine 20 MG tablet  Commonly known as:  BENTYL     furosemide 20 MG tablet  Commonly known as:  LASIX     gabapentin 300 MG capsule  Commonly known as:  NEURONTIN     indomethacin 50 MG capsule  Commonly known as:  INDOCIN     potassium chloride 20 MEQ extended release tablet  Commonly known as:  KLOR-CON M     propranolol-hydrochlorothiazide 40-25 MG per tablet  Commonly known as:  INDERIDE     SUMAtriptan 25 MG tablet  Commonly known as:  IMITREX     topiramate 25 MG tablet  Commonly known as:  TOPAMAX     Vitamin D3 2000 units Caps            Activity: activity as tolerated    Diet: regular diet    Wound Care: none needed    Follow-up with Dr. Margot Sanchze scheduled     Electronically signed by Love Zarate MD  on 4/7/2019 at 1:33 PM

## 2019-04-25 ENCOUNTER — HOSPITAL ENCOUNTER (OUTPATIENT)
Age: 64
Discharge: HOME | End: 2019-04-25
Payer: MEDICARE

## 2019-04-25 LAB
ALBUMIN SERPL-MCNC: 4.3 G/DL (ref 3.5–5.2)
ALP BLD-CCNC: 62 U/L (ref 40–129)
ALT SERPL-CCNC: 23 U/L (ref 0–40)
ANION GAP SERPL CALCULATED.3IONS-SCNC: 14 MMOL/L (ref 7–16)
AST SERPL-CCNC: 17 U/L (ref 0–39)
BASOPHILS ABSOLUTE: 0.12 E9/L (ref 0–0.2)
BASOPHILS RELATIVE PERCENT: 1.7 % (ref 0–2)
BILIRUB SERPL-MCNC: 0.4 MG/DL (ref 0–1.2)
BUN BLDV-MCNC: 50 MG/DL (ref 8–23)
CALCIUM SERPL-MCNC: 9.6 MG/DL (ref 8.6–10.2)
CHLORIDE BLD-SCNC: 104 MMOL/L (ref 98–107)
CHOLESTEROL, TOTAL: 194 MG/DL (ref 0–199)
CO2: 27 MMOL/L (ref 22–29)
CREAT SERPL-MCNC: 2.5 MG/DL (ref 0.7–1.2)
EOSINOPHILS ABSOLUTE: 0.39 E9/L (ref 0.05–0.5)
EOSINOPHILS RELATIVE PERCENT: 5.4 % (ref 0–6)
FERRITIN: 485 NG/ML
GFR AFRICAN AMERICAN: 32
GFR NON-AFRICAN AMERICAN: 26 ML/MIN/1.73
GLUCOSE BLD-MCNC: 107 MG/DL (ref 74–99)
HCT VFR BLD CALC: 44.7 % (ref 37–54)
HDLC SERPL-MCNC: 42 MG/DL
HEMOGLOBIN: 14.7 G/DL (ref 12.5–16.5)
IMMATURE GRANULOCYTES #: 0.04 E9/L
IMMATURE GRANULOCYTES %: 0.6 % (ref 0–5)
INR BLD: 1
IRON SATURATION: 25 % (ref 20–55)
IRON: 66 MCG/DL (ref 59–158)
LDL CHOLESTEROL CALCULATED: 116 MG/DL (ref 0–99)
LYMPHOCYTES ABSOLUTE: 1.24 E9/L (ref 1.5–4)
LYMPHOCYTES RELATIVE PERCENT: 17.1 % (ref 20–42)
MCH RBC QN AUTO: 29.5 PG (ref 26–35)
MCHC RBC AUTO-ENTMCNC: 32.9 % (ref 32–34.5)
MCV RBC AUTO: 89.6 FL (ref 80–99.9)
MONOCYTES ABSOLUTE: 0.6 E9/L (ref 0.1–0.95)
MONOCYTES RELATIVE PERCENT: 8.3 % (ref 2–12)
NEUTROPHILS ABSOLUTE: 4.85 E9/L (ref 1.8–7.3)
NEUTROPHILS RELATIVE PERCENT: 66.9 % (ref 43–80)
PDW BLD-RTO: 14.5 FL (ref 11.5–15)
PHOSPHORUS: 3.9 MG/DL (ref 2.5–4.5)
PLATELET # BLD: 204 E9/L (ref 130–450)
PMV BLD AUTO: 11.7 FL (ref 7–12)
POTASSIUM SERPL-SCNC: 3.2 MMOL/L (ref 3.5–5)
PROTHROMBIN TIME: 11.7 SEC (ref 9.3–12.4)
RBC # BLD: 4.99 E12/L (ref 3.8–5.8)
SODIUM BLD-SCNC: 145 MMOL/L (ref 132–146)
T4 FREE: 1.46 NG/DL (ref 0.93–1.7)
TOTAL IRON BINDING CAPACITY: 261 MCG/DL (ref 250–450)
TOTAL PROTEIN: 7.4 G/DL (ref 6.4–8.3)
TRIGL SERPL-MCNC: 178 MG/DL (ref 0–149)
TSH SERPL DL<=0.05 MIU/L-ACNC: 1.54 UIU/ML (ref 0.27–4.2)
VLDLC SERPL CALC-MCNC: 36 MG/DL
WBC # BLD: 7.2 E9/L (ref 4.5–11.5)

## 2019-04-25 PROCEDURE — 86803 HEPATITIS C AB TEST: CPT

## 2019-04-25 PROCEDURE — 84450 TRANSFERASE (AST) (SGOT): CPT

## 2019-04-25 PROCEDURE — 84478 ASSAY OF TRIGLYCERIDES: CPT

## 2019-04-25 PROCEDURE — 83550 IRON BINDING TEST: CPT

## 2019-04-25 PROCEDURE — 82465 ASSAY BLD/SERUM CHOLESTEROL: CPT

## 2019-04-25 PROCEDURE — 86255 FLUORESCENT ANTIBODY SCREEN: CPT

## 2019-04-25 PROCEDURE — 82247 BILIRUBIN TOTAL: CPT

## 2019-04-25 PROCEDURE — 82172 ASSAY OF APOLIPOPROTEIN: CPT

## 2019-04-25 PROCEDURE — 82785 ASSAY OF IGE: CPT

## 2019-04-25 PROCEDURE — 82947 ASSAY GLUCOSE BLOOD QUANT: CPT

## 2019-04-25 PROCEDURE — 84100 ASSAY OF PHOSPHORUS: CPT

## 2019-04-25 PROCEDURE — 86038 ANTINUCLEAR ANTIBODIES: CPT

## 2019-04-25 PROCEDURE — 82103 ALPHA-1-ANTITRYPSIN TOTAL: CPT

## 2019-04-25 PROCEDURE — 84443 ASSAY THYROID STIM HORMONE: CPT

## 2019-04-25 PROCEDURE — 83883 ASSAY NEPHELOMETRY NOT SPEC: CPT

## 2019-04-25 PROCEDURE — 81596 NFCT DS CHRNC HCV 6 ASSAYS: CPT

## 2019-04-25 PROCEDURE — 85610 PROTHROMBIN TIME: CPT

## 2019-04-25 PROCEDURE — 83010 ASSAY OF HAPTOGLOBIN QUANT: CPT

## 2019-04-25 PROCEDURE — 84439 ASSAY OF FREE THYROXINE: CPT

## 2019-04-25 PROCEDURE — 86709 HEPATITIS A IGM ANTIBODY: CPT

## 2019-04-25 PROCEDURE — 82784 ASSAY IGA/IGD/IGG/IGM EACH: CPT

## 2019-04-25 PROCEDURE — 36415 COLL VENOUS BLD VENIPUNCTURE: CPT

## 2019-04-25 PROCEDURE — 82977 ASSAY OF GGT: CPT

## 2019-04-25 PROCEDURE — 82728 ASSAY OF FERRITIN: CPT

## 2019-04-25 PROCEDURE — 86706 HEP B SURFACE ANTIBODY: CPT

## 2019-04-25 PROCEDURE — 87340 HEPATITIS B SURFACE AG IA: CPT

## 2019-04-25 PROCEDURE — 83540 ASSAY OF IRON: CPT

## 2019-04-25 PROCEDURE — 82787 IGG 1 2 3 OR 4 EACH: CPT

## 2019-04-25 PROCEDURE — 86039 ANTINUCLEAR ANTIBODIES (ANA): CPT

## 2019-04-25 PROCEDURE — 80061 LIPID PANEL: CPT

## 2019-04-25 PROCEDURE — 84460 ALANINE AMINO (ALT) (SGPT): CPT

## 2019-04-25 PROCEDURE — 85025 COMPLETE CBC W/AUTO DIFF WBC: CPT

## 2019-04-25 PROCEDURE — 80053 COMPREHEN METABOLIC PANEL: CPT

## 2019-04-26 LAB
ANA PATTERN: ABNORMAL
ANA TITER: ABNORMAL
ANTI-MITOCHONDRIAL AB, IFA: NEGATIVE
ANTI-NUCLEAR ANTIBODY (ANA): POSITIVE
HAV IGM SER IA-ACNC: NORMAL
HEPATITIS B SURFACE ANTIGEN INTERPRETATION: NORMAL
HEPATITIS C ANTIBODY INTERPRETATION: NORMAL
IGA: 181 MG/DL (ref 70–400)
SMOOTH MUSCLE ANTIBODY: NEGATIVE

## 2019-04-27 LAB — ALPHA-1 ANTITRYPSIN: 127 MG/DL (ref 90–200)

## 2019-04-28 LAB
IGE: <2 KU/L
IGG 1: 359 MG/DL (ref 240–1118)
IGG 2: 297 MG/DL (ref 124–549)
IGG 3: 71 MG/DL (ref 21–134)
IGG 4: 2 MG/DL (ref 1–123)

## 2019-04-29 LAB — HBV SURFACE AB TITR SER: NORMAL {TITER}

## 2019-04-30 LAB
Lab: NORMAL
Lab: NORMAL
REPORT: NORMAL
REPORT: NORMAL
THIS TEST SENT TO: NORMAL
THIS TEST SENT TO: NORMAL

## 2019-06-26 ENCOUNTER — HOSPITAL ENCOUNTER (OUTPATIENT)
Age: 64
Discharge: HOME | End: 2019-06-28

## 2019-06-26 PROCEDURE — 88305 TISSUE EXAM BY PATHOLOGIST: CPT

## 2019-06-26 PROCEDURE — 87081 CULTURE SCREEN ONLY: CPT

## 2019-06-27 LAB — CLOTEST: NORMAL

## 2021-01-29 ENCOUNTER — TELEPHONE (OUTPATIENT)
Dept: VASCULAR SURGERY | Age: 66
End: 2021-01-29

## 2021-02-01 ENCOUNTER — OFFICE VISIT (OUTPATIENT)
Dept: VASCULAR SURGERY | Age: 66
End: 2021-02-01
Payer: MEDICARE

## 2021-02-01 VITALS
WEIGHT: 263 LBS | SYSTOLIC BLOOD PRESSURE: 124 MMHG | DIASTOLIC BLOOD PRESSURE: 80 MMHG | HEIGHT: 67 IN | BODY MASS INDEX: 41.28 KG/M2

## 2021-02-01 DIAGNOSIS — N18.4 CKD (CHRONIC KIDNEY DISEASE) STAGE 4, GFR 15-29 ML/MIN (HCC): ICD-10-CM

## 2021-02-01 DIAGNOSIS — Z99.2 ENCOUNTER REGARDING VASCULAR ACCESS FOR DIALYSIS FOR ESRD (HCC): Primary | ICD-10-CM

## 2021-02-01 DIAGNOSIS — N18.6 ENCOUNTER REGARDING VASCULAR ACCESS FOR DIALYSIS FOR ESRD (HCC): Primary | ICD-10-CM

## 2021-02-01 PROCEDURE — G8484 FLU IMMUNIZE NO ADMIN: HCPCS | Performed by: SURGERY

## 2021-02-01 PROCEDURE — G8417 CALC BMI ABV UP PARAM F/U: HCPCS | Performed by: SURGERY

## 2021-02-01 PROCEDURE — 3017F COLORECTAL CA SCREEN DOC REV: CPT | Performed by: SURGERY

## 2021-02-01 PROCEDURE — 1036F TOBACCO NON-USER: CPT | Performed by: SURGERY

## 2021-02-01 PROCEDURE — G8427 DOCREV CUR MEDS BY ELIG CLIN: HCPCS | Performed by: SURGERY

## 2021-02-01 PROCEDURE — 99204 OFFICE O/P NEW MOD 45 MIN: CPT | Performed by: SURGERY

## 2021-02-01 NOTE — PROGRESS NOTES
Vascular Surgery Outpatient Consultation    Reason for Consult:  Dialysis Access    PCP : Do العراقي MD  Nephrologist : Dr. Jyaden Ayalad : CKD Stage IV    HISTORY OF PRESENT ILLNESS:    63 y.o. male who presents in regards to dialysis access creation. He has a known hx of CKD Stage IV. He has htn. He has never been on dialysis. He denies family hx of dialysis. He has never had previous tunneled central venous catheters. He denies hx of pacemakers or defibrillators. He is right handed. Hx of lymphedema and venous stasis ulcers last open in 2016. ROS : All others Negative if blank [], Positive if [x]  General Urinary   [] Fevers [] Hematuria   [] Chills [] Dysuria   [] Weight Loss  Vascular   Skin [] Claudication   [] Tissue Loss [] Rest Pain   Eyes Neurologic   [x] Wears Glasses/Contacts [] Stroke/TIA   [] Vision Changes [] Focal weakness   Respiratory [] Slurred Speech    [] Shortness of breath ENT   Cardiovascular [] Difficulty swallowing   [] Chest Pain Endocrine    [] Shortness of breath with exertion [] Increased Thirst   Gastrointestinal    [x] Abdominal Pain - R UQ abdominal pain chronic after lap choley - Dr. Se العراقي, colace seems to help    [] Melena   [] Hematochezia         Past Medical History:        Diagnosis Date    Arthritis     HTN (hypertension)     Keratoconus     Kidney disease     Lymphadenopathy     Migraine      Past Surgical History:        Procedure Laterality Date    CORNEAL TRANSPLANT      GALLBLADDER SURGERY      UPPER GASTROINTESTINAL ENDOSCOPY N/A 4/7/2019    EGD FOREIGN BODY REMOVAL performed by Nayeli Hilario MD at [facility] OR     Current Medications:   Current Outpatient Medications   Medication Sig Dispense Refill    dicyclomine (BENTYL) 20 MG tablet Take 20 mg by mouth every 4 hours as needed      gabapentin (NEURONTIN) 300 MG capsule Take 300 mg by mouth 4 times daily.       propranolol-hydrochlorothiazide (INDERIDE) 40-25 MG per tablet Take 1 tablet by mouth daily      amLODIPine (NORVASC) 10 MG tablet Take 10 mg by mouth daily      benazepril (LOTENSIN) 40 MG tablet Take 40 mg by mouth daily      furosemide (LASIX) 20 MG tablet Take 20 mg by mouth daily      topiramate (TOPAMAX) 25 MG tablet Take 25 mg by mouth daily      SUMAtriptan (IMITREX) 25 MG tablet Take 25 mg by mouth 2 times daily as needed for Migraine      allopurinol (ZYLOPRIM) 100 MG tablet Take 100 mg by mouth daily      indomethacin (INDOCIN) 50 MG capsule Take 50 mg by mouth 3 times daily as needed for Pain      potassium chloride (KLOR-CON M) 20 MEQ extended release tablet Take 20 mEq by mouth daily      Cholecalciferol (VITAMIN D3) 2000 units CAPS Take 1 capsule by mouth daily      Coenzyme Q10 (COQ10) 100 MG CAPS Take 1 capsule by mouth daily       No current facility-administered medications for this visit. Allergies:  Patient has no known allergies.   Social History     Socioeconomic History    Marital status:      Spouse name: Not on file    Number of children: Not on file    Years of education: Not on file    Highest education level: Not on file   Occupational History    Not on file   Social Needs    Financial resource strain: Not on file    Food insecurity     Worry: Not on file     Inability: Not on file    Transportation needs     Medical: Not on file     Non-medical: Not on file   Tobacco Use    Smoking status: Never Smoker    Smokeless tobacco: Never Used   Substance and Sexual Activity    Alcohol use: Never     Frequency: Never    Drug use: Not on file    Sexual activity: Not on file   Lifestyle    Physical activity     Days per week: Not on file     Minutes per session: Not on file    Stress: Not on file   Relationships    Social connections     Talks on phone: Not on file     Gets together: Not on file     Attends Buddhist service: Not on file     Active member of club or organization: Not on file     Attends meetings of clubs or organizations: Not on file     Relationship status: Not on file    Intimate partner violence     Fear of current or ex partner: Not on file     Emotionally abused: Not on file     Physically abused: Not on file     Forced sexual activity: Not on file   Other Topics Concern    Not on file   Social History Narrative    Not on file     History reviewed. No pertinent family history.   Labs  Lab Results   Component Value Date    WBC 7.2 04/25/2019    HGB 14.7 04/25/2019    HCT 44.7 04/25/2019     04/25/2019    PROTIME 11.7 04/25/2019    INR 1.0 04/25/2019    K 3.2 (L) 04/25/2019    BUN 50 (H) 04/25/2019    CREATININE 2.5 (H) 04/25/2019       PHYSICAL EXAM:    /80   Ht 5' 7\" (1.702 m)   Wt 263 lb (119.3 kg)   BMI 41.19 kg/m²   CONSTITUTIONAL:   Awake, alert, cooperative  PSYCHIATRIC :  Oriented to time, place and person     Appropriate insight to disease process  EYES: Lids and lashes normal  ENT:  External ears and nose without lesions   Hearing deficits notnoted  NECK: Supple, symmetrical, trachea midline   Thyroid goiter not appreciated   Carotid bruit notnoted  LUNGS:  No increased work of breathing                 Clear to auscultation  CARDIOVASCULAR:  regular rate and rhythm   ABDOMEN:  soft, non-distended, non-tender   Aorta is not palpable  Lymphatics : Cervical lymphadenopathy notnoted     Femoral lymphadenopathy notnoted  SKIN:   Normal skin color   Texture and turgor normal, no induration  EXTREMITIES:   R UE 5/5 strength   No cyanosis noted in nail beds  L UE 5/5 strength   No cyanosis noted in nail beds  R LE Edema present  L LE Edema present    L brachial 2+     L radial 2+     L ulnar biphasic     L palmar biphasic     Arterial Dominance radial   R femoral 2+ L femoral 2+   R dorsalis pedis biphasic L dorsalis pedis biphasic   R posterior tibial biphasic L posterior tibial bipohasic     RADIOLOGY: As    A/P Dialysis Access  CKD Stg IV  · Based off the vein mapping the best option will likely be L RC  · will schedule OR at patient's convenience   · I explained that I evaluate patient in OR with US to see if there are any other options  · discussed with patient options of AV fistula vs. AV Graft  · Discussed specifically the difficulties associated with dialysis access, time to use or maturation of access, potential need for additional procedures or even a completely new access if one created does not function well  · Discussed risk of steal syndrome, symptoms associated with, and potential need for ligation of access if significant  · Pt explained risks, benefits, complications, procedure, alternatives, options, and expected outcomes of each of the above options and was agreeable to proceed  · Approximately 25 minutes of the encounter was spent specifically discussing dialysis access as delineated above    Bhavna Kennedy

## 2021-02-01 NOTE — PATIENT INSTRUCTIONS
Call your doctor now or seek immediate medical care if:    · You have signs of infection, such as:  ? Increased pain, swelling, warmth, or redness around the access. ? Red streaks leading from the access. ? Pus draining from the access. ? A fever.     · You do not feel a pulse in your access. Watch closely for changes in your health, and be sure to contact your doctor if:    · You do not get better as expected. Where can you learn more? Go to https://[email or web address]. org and sign in to your [employer] account. Enter L169 in the [employer] box to learn more about \"Hemodialysis Vascular Access: Care Instructions. \"     If you do not have an account, please click on the \"Sign Up Now\" link. Current as of: April 15, 2020               Content Version: 12.6  © 8663-5260 [employer]. Care instructions adapted under license by [facility] ([facility]). If you have questions about a medical condition or this instruction, always ask your healthcare professional. [place] [address] any warranty or liability for your use of this information.

## 2021-02-19 ENCOUNTER — HOSPITAL ENCOUNTER (OUTPATIENT)
Age: 66
Discharge: HOME | End: 2021-02-21
Payer: MEDICARE

## 2021-02-19 DIAGNOSIS — Z01.818 PREOP TESTING: ICD-10-CM

## 2021-02-19 PROCEDURE — U0003 INFECTIOUS AGENT DETECTION BY NUCLEIC ACID (DNA OR RNA); SEVERE ACUTE RESPIRATORY SYNDROME CORONAVIRUS 2 (SARS-COV-2) (CORONAVIRUS DISEASE [COVID-19]), AMPLIFIED PROBE TECHNIQUE, MAKING USE OF HIGH THROUGHPUT TECHNOLOGIES AS DESCRIBED BY CMS-2020-01-R: HCPCS

## 2021-02-21 LAB
SARS-COV-2: NOT DETECTED
SOURCE: NORMAL

## 2021-02-23 ENCOUNTER — HOSPITAL ENCOUNTER (OUTPATIENT)
Dept: PREADMISSION TESTING | Age: 66
Discharge: HOME | End: 2021-02-23
Payer: MEDICARE

## 2021-02-23 ENCOUNTER — ANESTHESIA EVENT (OUTPATIENT)
Dept: OPERATING ROOM | Age: 66
End: 2021-02-23
Payer: MEDICARE

## 2021-02-23 VITALS
TEMPERATURE: 97.7 F | OXYGEN SATURATION: 95 % | BODY MASS INDEX: 42.06 KG/M2 | HEIGHT: 67 IN | SYSTOLIC BLOOD PRESSURE: 118 MMHG | WEIGHT: 268 LBS | DIASTOLIC BLOOD PRESSURE: 60 MMHG | RESPIRATION RATE: 14 BRPM | HEART RATE: 63 BPM

## 2021-02-23 LAB
ABO/RH: NORMAL
ANION GAP SERPL CALCULATED.3IONS-SCNC: 12 MMOL/L (ref 7–16)
ANTIBODY SCREEN: NORMAL
BUN BLDV-MCNC: 48 MG/DL (ref 8–23)
CALCIUM SERPL-MCNC: 9.5 MG/DL (ref 8.6–10.2)
CHLORIDE BLD-SCNC: 103 MMOL/L (ref 98–107)
CO2: 26 MMOL/L (ref 22–29)
CREAT SERPL-MCNC: 2.3 MG/DL (ref 0.7–1.2)
GFR AFRICAN AMERICAN: 35
GFR NON-AFRICAN AMERICAN: 29 ML/MIN/1.73
GLUCOSE BLD-MCNC: 102 MG/DL (ref 74–99)
HCT VFR BLD CALC: 43.7 % (ref 37–54)
HEMOGLOBIN: 14 G/DL (ref 12.5–16.5)
INR BLD: 0.9
MAGNESIUM: 2.1 MG/DL (ref 1.6–2.6)
MCH RBC QN AUTO: 28.3 PG (ref 26–35)
MCHC RBC AUTO-ENTMCNC: 32 % (ref 32–34.5)
MCV RBC AUTO: 88.3 FL (ref 80–99.9)
PDW BLD-RTO: 14.5 FL (ref 11.5–15)
PLATELET # BLD: 201 E9/L (ref 130–450)
PMV BLD AUTO: 11.3 FL (ref 7–12)
POTASSIUM REFLEX MAGNESIUM: 3.5 MMOL/L (ref 3.5–5)
PROTHROMBIN TIME: 11.2 SEC (ref 9.3–12.4)
RBC # BLD: 4.95 E12/L (ref 3.8–5.8)
SODIUM BLD-SCNC: 141 MMOL/L (ref 132–146)
WBC # BLD: 7.1 E9/L (ref 4.5–11.5)

## 2021-02-23 PROCEDURE — 83735 ASSAY OF MAGNESIUM: CPT

## 2021-02-23 PROCEDURE — 36415 COLL VENOUS BLD VENIPUNCTURE: CPT

## 2021-02-23 PROCEDURE — 85610 PROTHROMBIN TIME: CPT

## 2021-02-23 PROCEDURE — 86900 BLOOD TYPING SEROLOGIC ABO: CPT

## 2021-02-23 PROCEDURE — 93005 ELECTROCARDIOGRAM TRACING: CPT

## 2021-02-23 PROCEDURE — 86850 RBC ANTIBODY SCREEN: CPT

## 2021-02-23 PROCEDURE — 80048 BASIC METABOLIC PNL TOTAL CA: CPT

## 2021-02-23 PROCEDURE — 86901 BLOOD TYPING SEROLOGIC RH(D): CPT

## 2021-02-23 PROCEDURE — 85027 COMPLETE CBC AUTOMATED: CPT

## 2021-02-23 RX ORDER — COLCHICINE 0.6 MG/1
0.6 TABLET ORAL EVERY EVENING
COMMUNITY

## 2021-02-23 RX ORDER — FEBUXOSTAT 40 MG/1
40 TABLET, FILM COATED ORAL EVERY OTHER DAY
COMMUNITY
End: 2021-05-17 | Stop reason: ALTCHOICE

## 2021-02-23 RX ORDER — ROPIVACAINE HYDROCHLORIDE 5 MG/ML
30 INJECTION, SOLUTION EPIDURAL; INFILTRATION; PERINEURAL ONCE
Status: CANCELLED | OUTPATIENT
Start: 2021-02-23

## 2021-02-23 RX ORDER — MIDAZOLAM HYDROCHLORIDE 2 MG/2ML
1 INJECTION, SOLUTION INTRAMUSCULAR; INTRAVENOUS ONCE
Status: CANCELLED | OUTPATIENT
Start: 2021-02-23

## 2021-02-23 RX ORDER — FENTANYL CITRATE 50 UG/ML
50 INJECTION, SOLUTION INTRAMUSCULAR; INTRAVENOUS ONCE
Status: CANCELLED | OUTPATIENT
Start: 2021-02-23 | End: 2021-02-23

## 2021-02-23 RX ORDER — DOCUSATE SODIUM 100 MG/1
100 CAPSULE, LIQUID FILLED ORAL EVERY EVENING
COMMUNITY

## 2021-02-23 ASSESSMENT — LIFESTYLE VARIABLES: SMOKING_STATUS: 0

## 2021-02-23 NOTE — PROGRESS NOTES
Have you been tested for COVID  2/19/21 negative        Have you been told you were positive for COVID No  Have you had any known exposure to someone that is positive for COVID No  Do you have a cough                   No              Do you have shortness of breath No                 Do you have a sore throat            No                Are you having chills                    No                Are you having muscle aches. No                    Please come to the hospital wearing a mask and have your significant other wear a mask as well. Both of you should check your temperature before leaving to come here,  if it is 100 or higher please call [phone] for instruction. [facility] PRE-ADMISSION TESTING INSTRUCTIONS      ARRIVAL INSTRUCTIONS:  [x] Parking the day of Surgery is located in the Main Entrance lot.   Upon entering the door, someone will be there to greet you    [x] Bring photo ID and insurance card    [x] Please be sure to arrange for responsible adult to provide transportation to and from the hospital    [x] Please arrange for responsible adult to be with you for the 24 hour period post procedure due to having anesthesia      GENERAL INSTRUCTIONS:    [x] Nothing by mouth after midnight, including gum, candy, mints or water    [x] You may brush your teeth, but do not swallow any water    [x] Stop herbal supplements and vitamins 5 days prior to procedure    [x] Shower or bath with soap, lather and rinse well, AM of Surgery, no lotion, powders or creams to surgical site    [x] Jewelry, body piercing's, eyeglasses, contact lenses and dentures are not permitted into surgery (bring cases)    [x] You can expect a call the business day prior to procedure to notify you if your arrival time changes    [x] If you receive a survey after surgery we would greatly appreciate your comments    [x] Please notify surgeon if you develop any illness between now and time of surgery (cold, cough, sore throat, fever, nausea, vomiting) or any signs of infections  including skin, wounds, and dental.

## 2021-02-23 NOTE — ANESTHESIA PRE PROCEDURE
Department of Anesthesiology  Preprocedure Note       Name:  Faheem Braun   Age:  63 y.o.  :  1955                                          MRN:  [id number]         Date:  2021      Surgeon: Malik Patel):  Ev Ascencio MD    Procedure: Procedure(s):  CREATION AV FISTULA LEFT ARM    Medications prior to admission:   Prior to Admission medications    Medication Sig Start Date End Date Taking? Authorizing Provider   dicyclomine (BENTYL) 20 MG tablet Take 20 mg by mouth every 4 hours as needed    Historical Provider, MD   gabapentin (NEURONTIN) 300 MG capsule Take 300 mg by mouth 4 times daily.     Historical Provider, MD   propranolol-hydrochlorothiazide (INDERIDE) 40-25 MG per tablet Take 1 tablet by mouth daily    Historical Provider, MD   amLODIPine (NORVASC) 10 MG tablet Take 10 mg by mouth daily    Historical Provider, MD   benazepril (LOTENSIN) 40 MG tablet Take 40 mg by mouth daily    Historical Provider, MD   furosemide (LASIX) 20 MG tablet Take 20 mg by mouth daily    Historical Provider, MD   topiramate (TOPAMAX) 25 MG tablet Take 25 mg by mouth daily    Historical Provider, MD   SUMAtriptan (IMITREX) 25 MG tablet Take 25 mg by mouth 2 times daily as needed for Migraine    Historical Provider, MD   allopurinol (ZYLOPRIM) 100 MG tablet Take 100 mg by mouth daily    Historical Provider, MD   indomethacin (INDOCIN) 50 MG capsule Take 50 mg by mouth 3 times daily as needed for Pain    Historical Provider, MD   potassium chloride (KLOR-CON M) 20 MEQ extended release tablet Take 20 mEq by mouth daily    Historical Provider, MD   Cholecalciferol (VITAMIN D3) 2000 units CAPS Take 1 capsule by mouth daily    Historical Provider, MD   Coenzyme Q10 (COQ10) 100 MG CAPS Take 1 capsule by mouth daily    Historical Provider, MD       Current medications:    Current Outpatient Medications   Medication Sig Dispense Refill    dicyclomine (BENTYL) 20 MG tablet Take 20 mg by mouth every 4 hours as needed Answered      Vital Signs (Current): There were no vitals filed for this visit. BP Readings from Last 3 Encounters:   02/01/21 124/80   04/07/19 139/86   04/07/19 (!) 185/123       NPO Status:  greater than 8 hours                                                                               BMI:   Wt Readings from Last 3 Encounters:   02/01/21 263 lb (119.3 kg)   04/06/19 280 lb (127 kg)     There is no height or weight on file to calculate BMI.    CBC:   Lab Results   Component Value Date    WBC 7.2 04/25/2019    RBC 4.99 04/25/2019    HGB 14.7 04/25/2019    HCT 44.7 04/25/2019    MCV 89.6 04/25/2019    RDW 14.5 04/25/2019     04/25/2019       CMP:   Lab Results   Component Value Date     04/25/2019    K 3.2 04/25/2019    K 4.0 04/07/2019     04/25/2019    CO2 27 04/25/2019    BUN 50 04/25/2019    CREATININE 2.5 04/25/2019    GFRAA 32 04/25/2019    LABGLOM 26 04/25/2019    GLUCOSE 107 04/25/2019    PROT 7.4 04/25/2019    CALCIUM 9.6 04/25/2019    BILITOT 0.4 04/25/2019    ALKPHOS 62 04/25/2019    AST 17 04/25/2019    ALT 23 04/25/2019       POC Tests: No results for input(s): POCGLU, POCNA, POCK, POCCL, POCBUN, POCHEMO, POCHCT in the last 72 hours. Coags:   Lab Results   Component Value Date    PROTIME 11.7 04/25/2019    INR 1.0 04/25/2019       HCG (If Applicable): No results found for: PREGTESTUR, PREGSERUM, HCG, HCGQUANT     ABGs: No results found for: PHART, PO2ART, TIX8PXB, KPS2AOI, BEART, X7PQCLNL     Type & Screen (If Applicable):  No results found for: LABABO, LABRH    Drug/Infectious Status (If Applicable):  No results found for: HIV, HEPCAB    COVID-19 Screening (If Applicable):   Lab Results   Component Value Date    COVID19 Not Detected 02/19/2021         Anesthesia Evaluation  Patient summary reviewed and Nursing notes reviewed history of anesthetic complications (difficulty awakening after general anesthesia):    Airway: Mallampati: II TM distance: >3 FB   Neck ROM: full  Comment: beard  Mouth opening: > = 3 FB Dental: normal exam         Pulmonary: breath sounds clear to auscultation  (+) sleep apnea: on CPAP and noncompliant,      (-) not a current smoker                           Cardiovascular:    (+) hypertension:,         Rhythm: regular  Rate: normal                    Neuro/Psych:   (+) headaches: migraine headaches,              ROS comment: Claustrophobia- worsened when lying on his back GI/Hepatic/Renal:   (+) renal disease: ESRD and CRI, morbid obesity          Endo/Other:    (+) : arthritis:., .    (-) diabetes mellitus                ROS comment: Bilateral lower extremity lymphedema Abdominal:   (+) obese,         Vascular: negative vascular ROS. Anesthesia Plan      MAC and regional     ASA 3       Induction: intravenous. Anesthetic plan and risks discussed with patient. Discussed supraclavicular block- risks/benefits. Patient is agreeable    Also discussed LMAC in addition to regional block, with general anesthesia as back up. Patient understands and is agreeable    Patient to be re-evaluated by [facility] Anesthesiologist        Gerry Franco MD   2/23/2021        Changes made to above assessment and physical exam.  Due to body habitus and h/o SACHI, there is a chance of general anesthesia (along with the fact that the patient states he usually cannot lay on his back). Will proceed with preop nerve block as surgical anesthesia along with IV sedation;  Backup GA if needed.   Patient understands and wishes to proceed.  (this addendum was done preop but unable to be filed electronically at that time)

## 2021-02-25 ENCOUNTER — ANESTHESIA (OUTPATIENT)
Dept: OPERATING ROOM | Age: 66
End: 2021-02-25
Payer: MEDICARE

## 2021-02-25 ENCOUNTER — HOSPITAL ENCOUNTER (OUTPATIENT)
Age: 66
Setting detail: OUTPATIENT SURGERY
Discharge: HOME | End: 2021-02-25
Attending: SURGERY | Admitting: SURGERY
Payer: MEDICARE

## 2021-02-25 VITALS
HEART RATE: 71 BPM | BODY MASS INDEX: 42.06 KG/M2 | SYSTOLIC BLOOD PRESSURE: 106 MMHG | HEIGHT: 67 IN | TEMPERATURE: 96.7 F | DIASTOLIC BLOOD PRESSURE: 64 MMHG | RESPIRATION RATE: 18 BRPM | WEIGHT: 268 LBS | OXYGEN SATURATION: 95 %

## 2021-02-25 VITALS — OXYGEN SATURATION: 92 % | SYSTOLIC BLOOD PRESSURE: 111 MMHG | DIASTOLIC BLOOD PRESSURE: 58 MMHG

## 2021-02-25 DIAGNOSIS — Z01.818 PREOP TESTING: ICD-10-CM

## 2021-02-25 DIAGNOSIS — Z99.2 ENCOUNTER REGARDING VASCULAR ACCESS FOR DIALYSIS FOR ESRD (HCC): Primary | ICD-10-CM

## 2021-02-25 DIAGNOSIS — N18.6 ENCOUNTER REGARDING VASCULAR ACCESS FOR DIALYSIS FOR ESRD (HCC): Primary | ICD-10-CM

## 2021-02-25 LAB
ANION GAP SERPL CALCULATED.3IONS-SCNC: 10 MMOL/L (ref 7–16)
BUN BLDV-MCNC: 52 MG/DL (ref 8–23)
CALCIUM SERPL-MCNC: 9 MG/DL (ref 8.6–10.2)
CHLORIDE BLD-SCNC: 107 MMOL/L (ref 98–107)
CO2: 26 MMOL/L (ref 22–29)
CREAT SERPL-MCNC: 2.6 MG/DL (ref 0.7–1.2)
GFR AFRICAN AMERICAN: 30
GFR NON-AFRICAN AMERICAN: 25 ML/MIN/1.73
GLUCOSE BLD-MCNC: 116 MG/DL (ref 74–99)
POTASSIUM SERPL-SCNC: 3.7 MMOL/L (ref 3.5–5)
SODIUM BLD-SCNC: 143 MMOL/L (ref 132–146)

## 2021-02-25 PROCEDURE — 6360000002 HC RX W HCPCS: Performed by: ANESTHESIOLOGY

## 2021-02-25 PROCEDURE — 2500000003 HC RX 250 WO HCPCS: Performed by: SURGERY

## 2021-02-25 PROCEDURE — 3700000000 HC ANESTHESIA ATTENDED CARE: Performed by: SURGERY

## 2021-02-25 PROCEDURE — 36821 AV FUSION DIRECT ANY SITE: CPT | Performed by: SURGERY

## 2021-02-25 PROCEDURE — 80048 BASIC METABOLIC PNL TOTAL CA: CPT

## 2021-02-25 PROCEDURE — 7100000010 HC PHASE II RECOVERY - FIRST 15 MIN: Performed by: SURGERY

## 2021-02-25 PROCEDURE — 3700000001 HC ADD 15 MINUTES (ANESTHESIA): Performed by: SURGERY

## 2021-02-25 PROCEDURE — 2500000003 HC RX 250 WO HCPCS: Performed by: NURSE ANESTHETIST, CERTIFIED REGISTERED

## 2021-02-25 PROCEDURE — 7100000011 HC PHASE II RECOVERY - ADDTL 15 MIN: Performed by: SURGERY

## 2021-02-25 PROCEDURE — 6360000002 HC RX W HCPCS: Performed by: NURSE ANESTHETIST, CERTIFIED REGISTERED

## 2021-02-25 PROCEDURE — 7100000000 HC PACU RECOVERY - FIRST 15 MIN: Performed by: SURGERY

## 2021-02-25 PROCEDURE — 3600000003 HC SURGERY LEVEL 3 BASE: Performed by: SURGERY

## 2021-02-25 PROCEDURE — 36821 AV FUSION DIRECT ANY SITE: CPT | Performed by: PHYSICIAN ASSISTANT

## 2021-02-25 PROCEDURE — 2580000003 HC RX 258: Performed by: SURGERY

## 2021-02-25 PROCEDURE — 2709999900 HC NON-CHARGEABLE SUPPLY: Performed by: SURGERY

## 2021-02-25 PROCEDURE — 64415 NJX AA&/STRD BRCH PLXS IMG: CPT | Performed by: ANESTHESIOLOGY

## 2021-02-25 PROCEDURE — 3600000013 HC SURGERY LEVEL 3 ADDTL 15MIN: Performed by: SURGERY

## 2021-02-25 PROCEDURE — 36415 COLL VENOUS BLD VENIPUNCTURE: CPT

## 2021-02-25 PROCEDURE — 7100000001 HC PACU RECOVERY - ADDTL 15 MIN: Performed by: SURGERY

## 2021-02-25 RX ORDER — SODIUM CHLORIDE 0.9 % (FLUSH) 0.9 %
10 SYRINGE (ML) INJECTION EVERY 12 HOURS SCHEDULED
Status: DISCONTINUED | OUTPATIENT
Start: 2021-02-25 | End: 2021-02-25 | Stop reason: HOSPADM

## 2021-02-25 RX ORDER — FENTANYL CITRATE 50 UG/ML
50 INJECTION, SOLUTION INTRAMUSCULAR; INTRAVENOUS EVERY 5 MIN PRN
Status: DISCONTINUED | OUTPATIENT
Start: 2021-02-25 | End: 2021-02-25 | Stop reason: HOSPADM

## 2021-02-25 RX ORDER — ATROPINE SULFATE 0.4 MG/ML
AMPUL (ML) INJECTION PRN
Status: DISCONTINUED | OUTPATIENT
Start: 2021-02-25 | End: 2021-02-25 | Stop reason: SDUPTHER

## 2021-02-25 RX ORDER — SUCCINYLCHOLINE/SOD CL,ISO/PF 200MG/10ML
SYRINGE (ML) INTRAVENOUS PRN
Status: DISCONTINUED | OUTPATIENT
Start: 2021-02-25 | End: 2021-02-25 | Stop reason: SDUPTHER

## 2021-02-25 RX ORDER — MEPERIDINE HYDROCHLORIDE 25 MG/ML
12.5 INJECTION INTRAMUSCULAR; INTRAVENOUS; SUBCUTANEOUS EVERY 5 MIN PRN
Status: DISCONTINUED | OUTPATIENT
Start: 2021-02-25 | End: 2021-02-25 | Stop reason: HOSPADM

## 2021-02-25 RX ORDER — FENTANYL CITRATE 50 UG/ML
50 INJECTION, SOLUTION INTRAMUSCULAR; INTRAVENOUS ONCE
Status: COMPLETED | OUTPATIENT
Start: 2021-02-25 | End: 2021-02-25

## 2021-02-25 RX ORDER — GLYCOPYRROLATE 1 MG/5 ML
SYRINGE (ML) INTRAVENOUS PRN
Status: DISCONTINUED | OUTPATIENT
Start: 2021-02-25 | End: 2021-02-25 | Stop reason: SDUPTHER

## 2021-02-25 RX ORDER — DIPHENHYDRAMINE HYDROCHLORIDE 50 MG/ML
12.5 INJECTION INTRAMUSCULAR; INTRAVENOUS
Status: DISCONTINUED | OUTPATIENT
Start: 2021-02-25 | End: 2021-02-25 | Stop reason: HOSPADM

## 2021-02-25 RX ORDER — ROPIVACAINE HYDROCHLORIDE 5 MG/ML
INJECTION, SOLUTION EPIDURAL; INFILTRATION; PERINEURAL
Status: DISPENSED
Start: 2021-02-25 | End: 2021-02-25

## 2021-02-25 RX ORDER — ROCURONIUM BROMIDE 10 MG/ML
INJECTION, SOLUTION INTRAVENOUS PRN
Status: DISCONTINUED | OUTPATIENT
Start: 2021-02-25 | End: 2021-02-25 | Stop reason: SDUPTHER

## 2021-02-25 RX ORDER — MIDAZOLAM HYDROCHLORIDE 1 MG/ML
INJECTION INTRAMUSCULAR; INTRAVENOUS
Status: DISPENSED
Start: 2021-02-25 | End: 2021-02-25

## 2021-02-25 RX ORDER — PROMETHAZINE HYDROCHLORIDE 25 MG/ML
6.25 INJECTION, SOLUTION INTRAMUSCULAR; INTRAVENOUS
Status: DISCONTINUED | OUTPATIENT
Start: 2021-02-25 | End: 2021-02-25 | Stop reason: HOSPADM

## 2021-02-25 RX ORDER — PROPOFOL 10 MG/ML
INJECTION, EMULSION INTRAVENOUS PRN
Status: DISCONTINUED | OUTPATIENT
Start: 2021-02-25 | End: 2021-02-25 | Stop reason: SDUPTHER

## 2021-02-25 RX ORDER — EPHEDRINE SULFATE/0.9% NACL/PF 50 MG/5 ML
SYRINGE (ML) INTRAVENOUS PRN
Status: DISCONTINUED | OUTPATIENT
Start: 2021-02-25 | End: 2021-02-25 | Stop reason: SDUPTHER

## 2021-02-25 RX ORDER — PHENYLEPHRINE HYDROCHLORIDE 10 MG/ML
INJECTION INTRAVENOUS PRN
Status: DISCONTINUED | OUTPATIENT
Start: 2021-02-25 | End: 2021-02-25 | Stop reason: SDUPTHER

## 2021-02-25 RX ORDER — MIDAZOLAM HYDROCHLORIDE 2 MG/2ML
1 INJECTION, SOLUTION INTRAMUSCULAR; INTRAVENOUS ONCE
Status: COMPLETED | OUTPATIENT
Start: 2021-02-25 | End: 2021-02-25

## 2021-02-25 RX ORDER — FENTANYL CITRATE 50 UG/ML
INJECTION, SOLUTION INTRAMUSCULAR; INTRAVENOUS
Status: DISPENSED
Start: 2021-02-25 | End: 2021-02-25

## 2021-02-25 RX ORDER — MIDAZOLAM HYDROCHLORIDE 1 MG/ML
INJECTION INTRAMUSCULAR; INTRAVENOUS PRN
Status: DISCONTINUED | OUTPATIENT
Start: 2021-02-25 | End: 2021-02-25 | Stop reason: SDUPTHER

## 2021-02-25 RX ORDER — ROPIVACAINE HYDROCHLORIDE 5 MG/ML
INJECTION, SOLUTION EPIDURAL; INFILTRATION; PERINEURAL
Status: COMPLETED | OUTPATIENT
Start: 2021-02-25 | End: 2021-02-25

## 2021-02-25 RX ORDER — OXYCODONE HYDROCHLORIDE AND ACETAMINOPHEN 5; 325 MG/1; MG/1
1 TABLET ORAL EVERY 6 HOURS PRN
Qty: 20 TABLET | Refills: 0 | Status: SHIPPED | OUTPATIENT
Start: 2021-02-25 | End: 2021-03-02

## 2021-02-25 RX ORDER — FENTANYL CITRATE 50 UG/ML
INJECTION, SOLUTION INTRAMUSCULAR; INTRAVENOUS PRN
Status: DISCONTINUED | OUTPATIENT
Start: 2021-02-25 | End: 2021-02-25 | Stop reason: SDUPTHER

## 2021-02-25 RX ORDER — FENTANYL CITRATE 50 UG/ML
25 INJECTION, SOLUTION INTRAMUSCULAR; INTRAVENOUS EVERY 5 MIN PRN
Status: DISCONTINUED | OUTPATIENT
Start: 2021-02-25 | End: 2021-02-25 | Stop reason: HOSPADM

## 2021-02-25 RX ORDER — SODIUM CHLORIDE 9 MG/ML
INJECTION, SOLUTION INTRAVENOUS CONTINUOUS
Status: DISCONTINUED | OUTPATIENT
Start: 2021-02-25 | End: 2021-02-25 | Stop reason: HOSPADM

## 2021-02-25 RX ORDER — SODIUM CHLORIDE 0.9 % (FLUSH) 0.9 %
10 SYRINGE (ML) INJECTION PRN
Status: DISCONTINUED | OUTPATIENT
Start: 2021-02-25 | End: 2021-02-25 | Stop reason: HOSPADM

## 2021-02-25 RX ORDER — ROPIVACAINE HYDROCHLORIDE 5 MG/ML
30 INJECTION, SOLUTION EPIDURAL; INFILTRATION; PERINEURAL ONCE
Status: COMPLETED | OUTPATIENT
Start: 2021-02-25 | End: 2021-02-25

## 2021-02-25 RX ADMIN — Medication 10 MG: at 08:25

## 2021-02-25 RX ADMIN — MIDAZOLAM HYDROCHLORIDE 2 MG: 1 INJECTION, SOLUTION INTRAMUSCULAR; INTRAVENOUS at 07:07

## 2021-02-25 RX ADMIN — PHENYLEPHRINE HYDROCHLORIDE 200 MCG: 10 INJECTION INTRAVENOUS at 07:43

## 2021-02-25 RX ADMIN — SODIUM CHLORIDE: 9 INJECTION, SOLUTION INTRAVENOUS at 07:05

## 2021-02-25 RX ADMIN — ROPIVACAINE HYDROCHLORIDE 30 ML: 5 INJECTION, SOLUTION EPIDURAL; INFILTRATION; PERINEURAL at 07:12

## 2021-02-25 RX ADMIN — Medication 10 MG: at 08:29

## 2021-02-25 RX ADMIN — PROPOFOL 70 MG: 10 INJECTION, EMULSION INTRAVENOUS at 07:49

## 2021-02-25 RX ADMIN — PROPOFOL 180 MG: 10 INJECTION, EMULSION INTRAVENOUS at 07:28

## 2021-02-25 RX ADMIN — Medication 100 MG: at 07:49

## 2021-02-25 RX ADMIN — FENTANYL CITRATE 50 MCG: 50 INJECTION, SOLUTION INTRAMUSCULAR; INTRAVENOUS at 07:27

## 2021-02-25 RX ADMIN — Medication 10 MG: at 08:43

## 2021-02-25 RX ADMIN — PHENYLEPHRINE HYDROCHLORIDE 100 MCG: 10 INJECTION INTRAVENOUS at 07:39

## 2021-02-25 RX ADMIN — SUGAMMADEX 486 MG: 100 INJECTION, SOLUTION INTRAVENOUS at 09:29

## 2021-02-25 RX ADMIN — MIDAZOLAM 2 MG: 1 INJECTION INTRAMUSCULAR; INTRAVENOUS at 07:23

## 2021-02-25 RX ADMIN — Medication 10 MG: at 08:20

## 2021-02-25 RX ADMIN — ROCURONIUM BROMIDE 10 MG: 10 INJECTION, SOLUTION INTRAVENOUS at 08:54

## 2021-02-25 RX ADMIN — PHENYLEPHRINE HYDROCHLORIDE 100 MCG: 10 INJECTION INTRAVENOUS at 07:36

## 2021-02-25 RX ADMIN — Medication 0.4 MG: at 07:41

## 2021-02-25 RX ADMIN — ROPIVACAINE HYDROCHLORIDE 30 ML: 5 INJECTION, SOLUTION EPIDURAL; INFILTRATION; PERINEURAL at 07:15

## 2021-02-25 RX ADMIN — FENTANYL CITRATE 50 MCG: 50 INJECTION, SOLUTION INTRAMUSCULAR; INTRAVENOUS at 07:28

## 2021-02-25 RX ADMIN — FENTANYL CITRATE 50 MCG: 50 INJECTION, SOLUTION INTRAMUSCULAR; INTRAVENOUS at 07:07

## 2021-02-25 RX ADMIN — Medication 10 MG: at 08:38

## 2021-02-25 RX ADMIN — ROCURONIUM BROMIDE 20 MG: 10 INJECTION, SOLUTION INTRAVENOUS at 08:00

## 2021-02-25 RX ADMIN — ATROPINE SULFATE 0.2 MG: 0.4 INJECTION, SOLUTION INTRAMUSCULAR; INTRAVENOUS; SUBCUTANEOUS at 07:44

## 2021-02-25 ASSESSMENT — PULMONARY FUNCTION TESTS
PIF_VALUE: 20
PIF_VALUE: 35
PIF_VALUE: 19
PIF_VALUE: 20
PIF_VALUE: 31
PIF_VALUE: 19
PIF_VALUE: 21
PIF_VALUE: 18
PIF_VALUE: 20
PIF_VALUE: 20
PIF_VALUE: 42
PIF_VALUE: 47
PIF_VALUE: 21
PIF_VALUE: 21
PIF_VALUE: 20
PIF_VALUE: 19
PIF_VALUE: 20
PIF_VALUE: 21
PIF_VALUE: 20
PIF_VALUE: 21
PIF_VALUE: 20
PIF_VALUE: 21
PIF_VALUE: 20
PIF_VALUE: 19
PIF_VALUE: 18
PIF_VALUE: 21
PIF_VALUE: 20
PIF_VALUE: 21
PIF_VALUE: 19
PIF_VALUE: 18
PIF_VALUE: 20
PIF_VALUE: 20
PIF_VALUE: 19
PIF_VALUE: 20
PIF_VALUE: 29
PIF_VALUE: 20
PIF_VALUE: 21
PIF_VALUE: 13
PIF_VALUE: 20
PIF_VALUE: 21
PIF_VALUE: 21
PIF_VALUE: 28
PIF_VALUE: 20
PIF_VALUE: 2
PIF_VALUE: 21
PIF_VALUE: 20
PIF_VALUE: 21
PIF_VALUE: 20
PIF_VALUE: 21
PIF_VALUE: 2
PIF_VALUE: 20
PIF_VALUE: 21
PIF_VALUE: 26
PIF_VALUE: 21
PIF_VALUE: 20
PIF_VALUE: 21
PIF_VALUE: 20
PIF_VALUE: 2
PIF_VALUE: 26
PIF_VALUE: 21
PIF_VALUE: 20
PIF_VALUE: 20

## 2021-02-25 ASSESSMENT — PAIN SCALES - GENERAL
PAINLEVEL_OUTOF10: 0

## 2021-02-25 ASSESSMENT — PAIN - FUNCTIONAL ASSESSMENT: PAIN_FUNCTIONAL_ASSESSMENT: 0-10

## 2021-02-25 NOTE — ANESTHESIA POSTPROCEDURE EVALUATION
Department of Anesthesiology  Postprocedure Note    Patient: Boston Rao  MRN: [id number]  YOB: 1955  Date of evaluation: 2/25/2021  Time:  2:28 PM     Procedure Summary     Date: 02/25/21 Room / Location: [facility] OR 07 / [facility]    Anesthesia Start: 7415 Anesthesia Stop: 7060    Procedure: CREATION AV FISTULA LEFT ARM (Left ) Diagnosis: (CHRONIC RENAL FAILURE)    Surgeons: Gio Cochran MD Responsible Provider: Adilson Luong MD    Anesthesia Type: MAC, regional ASA Status: 3          Anesthesia Type: MAC, regional    Edgar Phase I: Edgar Score: 10    Edgar Phase II: Edgar Score: 10    Last vitals: Reviewed and per EMR flowsheets.        Anesthesia Post Evaluation    Patient location during evaluation: PACU  Patient participation: complete - patient participated  Level of consciousness: awake  Airway patency: patent  Nausea & Vomiting: no vomiting and no nausea  Complications: no  Cardiovascular status: hemodynamically stable  Respiratory status: acceptable  Hydration status: stable

## 2021-02-25 NOTE — H&P
Vascular Surgery History & Physical Exam      Chief Complaint: CKD IV    HISTORY OF PRESENT ILLNESS:                The patient is a 63 y.o. male who presents to the hospital for elective creation of a arteriovenous fistula, possible graft. The patient denies any problems since the last office visit. IMPRESSION:  CKD IV    PLAN:  Creation of a left upper extremity arteriovenous fistula, possible graft    I reviewed the procedure with the patient and family as available. I discussed the procedure, risks, benefits, complications, and alternatives of the procedure. They understand and consent.   All questions were answered    ROS : All others Negative if blank [], Positive if [x]  General   [] Fevers   [] Chills   [] Weight Loss   Skin   [] Tissue Loss   Eyes   [] Wears Glasses/Contacts   [] Vision Changes   Respiratory    [] Shortness of breath   Cardiovascular   [] Chest Pain   [] Shortness of breath with exertion   Gastrointestinal   [] Abdominal Pain     Past Medical History:   Diagnosis Date    Arthritis     HTN (hypertension)     Keratoconus     Kidney disease     Lymphadenopathy     Migraine     Prolonged emergence from general anesthesia      Past Surgical History:   Procedure Laterality Date    COLONOSCOPY      CORNEAL TRANSPLANT      GALLBLADDER SURGERY      UPPER GASTROINTESTINAL ENDOSCOPY N/A 4/7/2019    EGD FOREIGN BODY REMOVAL performed by Wilfredo Morris MD at [address] [place] [address]     Current Medications:     Current Facility-Administered Medications:     0.9 % sodium chloride infusion, , Intravenous, Continuous, Junior Hylton MD    sodium chloride flush 0.9 % injection 10 mL, 10 mL, Intravenous, 2 times per day, Junior Hylton MD    sodium chloride flush 0.9 % injection 10 mL, 10 mL, Intravenous, PRN, Junior Hylton MD    fentaNYL (SUBLIMAZE) injection 50 mcg, 50 mcg, Intravenous, Once, Kash Clark MD    midazolam PF (VERSED) injection 1 mg, 1 mg, Intravenous, Once, Karsten Davila MD    ropivacaine (NAROPIN) 0.5% injection 30 mL, 30 mL, Infiltration, Once, Karsten Davila MD    ceFAZolin (ANCEF) 3,000 mg in dextrose 5 % 100 mL IVPB, 3,000 mg, Intravenous, On Call to OR, Flavio Reyez MD  Allergies:  Codeine and Morphine  Social History     Socioeconomic History    Marital status:      Spouse name: Not on file    Number of children: Not on file    Years of education: Not on file    Highest education level: Not on file   Occupational History    Not on file   Social Needs    Financial resource strain: Not on file    Food insecurity     Worry: Not on file     Inability: Not on file    Transportation needs     Medical: Not on file     Non-medical: Not on file   Tobacco Use    Smoking status: Never Smoker    Smokeless tobacco: Never Used   Substance and Sexual Activity    Alcohol use: Never     Frequency: Never    Drug use: Not on file    Sexual activity: Not on file   Lifestyle    Physical activity     Days per week: Not on file     Minutes per session: Not on file    Stress: Not on file   Relationships    Social connections     Talks on phone: Not on file     Gets together: Not on file     Attends Zoroastrian service: Not on file     Active member of club or organization: Not on file     Attends meetings of clubs or organizations: Not on file     Relationship status: Not on file    Intimate partner violence     Fear of current or ex partner: Not on file     Emotionally abused: Not on file     Physically abused: Not on file     Forced sexual activity: Not on file   Other Topics Concern    Not on file   Social History Narrative    Not on file     No family history on file. REVIEW OF SYSTEMS:  The chart was reviewed.     PHYSICAL EXAM:    Vitals:    02/25/21 0619   BP: 118/63   Resp: 18   Temp: 97.1 °F (36.2 °C)   SpO2: 94%     CONSTITUTIONAL:  awake, alert, cooperative, no apparent distress, and appears stated age  NECK:  supple, symmetrical, trachea midline  LUNGS:  no increased work of breathing, good resp excursion  CARDIOVASCULAR:  regular rate and rhythm   ABDOMEN:  soft, non-distended and non-tender  left upper extremity   Brachial 2+ Radial 2+    LABS:    Lab Results   Component Value Date    WBC 7.1 02/23/2021    HGB 14.0 02/23/2021    HCT 43.7 02/23/2021     02/23/2021    PROTIME 11.2 02/23/2021    INR 0.9 02/23/2021    K 3.7 02/25/2021    BUN 52 (H) 02/25/2021    CREATININE 2.6 (H) 02/25/2021       Dae Schultz

## 2021-02-25 NOTE — OP NOTE
Lelejustin Rm  1955      DATE OF PROCEDURE: 2/25/2021     SURGEON: Rena Garduno M.D.     ASSISTANT: Prieto DIETRICH     PREOPERATIVE DIAGNOSIS: Chronic renal failure, Stage IV. POSTOPERATIVE DIAGNOSIS: Same    OPERATION: Creation of left radiocephalic arteriovenous fistula     ANESTHESIA: Regional, General     ESTIMATED BLOOD LOSS: Minimal     COMPLICATIONS: None    DESCRIPTION OF PROCEDURE: The patient was identified and the procedure was confirmed. The left arm was prepped and draped in the usual sterile fashion. One percent lidocaine mixed with 0.25% Marcaine was used for local anesthesia. A skin incision was made over the radial artery branch in cephalic vein in the region of the snuff box and carried down through the subcutaneous tissue. The cephalic vein was identified and freed from the surrounding tissues. It was marked for orientation. Branches were ligated between silk ties. The radial artery branch was freed from the surrounding tissues. The patient was heparinized and the vein was ligated distally and transected. The artery was clamped proximally and distally, and a longitudinal arteriotomy was made. The vein was cut to the appropriate length and spatulated, and anastomosed to the side of the artery using a running 7-0 Prolene suture. After completing the anastomosis, the clamps were released and a good thrill was appreciated through the fistula. Hemostasis was obtained and the incisions were approximated with Vicryl suture, and the skin was closed with subcuticular Monocryl stitch. Dermabond was applied to the incisions in the operating room. Needle, sponge and instrument counts were reported as correct x2. The patient tolerated the procedure and was transferred to the recovery area in satisfactory condition. KARLO Alejandro was scrubbed and assisted during the entire procedure. No qualified resident was available.         Rena Garduno    PCP : Eduardo Mei Enid Garg MD  Nephrologist : Dr. Marc Chavis : CKD Stage IV       2/25/21 L RC AVF

## 2021-02-25 NOTE — PROGRESS NOTES
5750 time out performed for PNB. Sedation given as per ordered, see MAR.   0908 procedure ended, vs stable. Denies pain. Wife at bedside.

## 2021-02-25 NOTE — ANESTHESIA PROCEDURE NOTES
Peripheral Block    Patient location during procedure: pre-op  Start time: 2/25/2021 7:07 AM  End time: 2/25/2021 7:15 AM  Staffing  Performed: anesthesiologist   Anesthesiologist: Kelly Ricardo MD  Preanesthetic Checklist  Completed: patient identified, IV checked, site marked, risks and benefits discussed, surgical consent, monitors and equipment checked, pre-op evaluation, timeout performed, anesthesia consent given, oxygen available and patient being monitored  Peripheral Block  Patient position: supine  Prep: ChloraPrep  Patient monitoring: cardiac monitor, continuous pulse ox, frequent blood pressure checks and IV access  Block type: Brachial plexus  Laterality: left  Injection technique: single-shot  Guidance: ultrasound guided  Supraclavicular  Provider prep: mask and sterile gloves  Needle  Needle type: combined needle/nerve stimulator   Needle gauge: 21 G  Needle length: 10 cm  Needle localization: ultrasound guidance  Assessment  Injection assessment: negative aspiration for heme, no paresthesia on injection and local visualized surrounding nerve on ultrasound  Paresthesia pain: none  Slow fractionated injection: yes  Hemodynamics: stable  Additional Notes  TOTAL OF 2MG MIDAZOLAM IV AND 50MCG FENTANYL IV GIVEN FOR NERVE BLOCK. NO COMPLICATIONS.   Medications Administered  Ropivacaine (NAROPIN) 0.5% injection, 30 mL  Reason for block: post-op pain management and at surgeon's request

## 2021-02-26 ENCOUNTER — TELEPHONE (OUTPATIENT)
Dept: VASCULAR SURGERY | Age: 66
End: 2021-02-26

## 2021-02-26 LAB
EKG ATRIAL RATE: 57 BPM
EKG P AXIS: 36 DEGREES
EKG P-R INTERVAL: 170 MS
EKG Q-T INTERVAL: 426 MS
EKG QRS DURATION: 94 MS
EKG QTC CALCULATION (BAZETT): 414 MS
EKG R AXIS: 20 DEGREES
EKG T AXIS: 28 DEGREES
EKG VENTRICULAR RATE: 57 BPM

## 2021-02-26 NOTE — TELEPHONE ENCOUNTER
----- Message from Chauncey Brown PA-C sent at 2/25/2021  9:48 AM EST -----  Please schedule patient for fu with PK in 2 weeks.   Adrianne

## 2021-03-12 ENCOUNTER — TELEPHONE (OUTPATIENT)
Dept: VASCULAR SURGERY | Age: 66
End: 2021-03-12

## 2021-03-15 ENCOUNTER — OFFICE VISIT (OUTPATIENT)
Dept: VASCULAR SURGERY | Age: 66
End: 2021-03-15

## 2021-03-15 VITALS — BODY MASS INDEX: 41.59 KG/M2 | WEIGHT: 265 LBS | HEIGHT: 67 IN

## 2021-03-15 DIAGNOSIS — Z99.2 ENCOUNTER REGARDING VASCULAR ACCESS FOR DIALYSIS FOR ESRD (HCC): Primary | ICD-10-CM

## 2021-03-15 DIAGNOSIS — N18.4 CKD (CHRONIC KIDNEY DISEASE) STAGE 4, GFR 15-29 ML/MIN (HCC): ICD-10-CM

## 2021-03-15 DIAGNOSIS — N18.6 ENCOUNTER REGARDING VASCULAR ACCESS FOR DIALYSIS FOR ESRD (HCC): Primary | ICD-10-CM

## 2021-03-15 DIAGNOSIS — I77.0 AVF (ARTERIOVENOUS FISTULA) (HCC): ICD-10-CM

## 2021-03-15 PROCEDURE — 99024 POSTOP FOLLOW-UP VISIT: CPT | Performed by: SURGERY

## 2021-03-15 NOTE — PATIENT INSTRUCTIONS
Patient Education        Hemodialysis Vascular Access: Care Instructions  Overview  Hemodialysis, or dialysis, is the use of a machine to remove wastes from your blood. You need it if your kidneys are not able to remove wastes on their own. A dialysis access is the place in your arm, or sometimes in your leg, where a doctor creates a blood vessel that carries a large flow of blood. Your doctor creates an access during a minor surgery. You need to take care of your access to keep it working and to prevent infection. When you have dialysis, two needles are placed in this blood vessel and are connected to the dialysis machine. Your blood flows out of one needle and into the machine to be cleaned. Then your cleaned blood flows back into your body through the other needle. Sometimes a doctor makes a short-term access through a tube, called a catheter, placed in your neck, upper chest, or groin. Follow-up care is a key part of your treatment and safety. Be sure to make and go to all appointments, and call your doctor if you are having problems. It's also a good idea to know your test results and keep a list of the medicines you take. How can you care for yourself at home? · After your doctor creates an access, keep it dry for at least 2 days. · Squeeze a soft ball or other object as instructed after the access is placed. This will help blood flow through the access and help prevent blood clots. · After you have dialysis, check to see if the access bleeds or swells. Let your doctor know if your arm bleeds or swells. · Do not lift anything heavy with the arm that has the access. · Do not bump your arm. · Don't wear tight clothing or jewelry over the access. · Don't sleep with your access arm under your body. · Have blood drawn or blood pressure taken from your other arm. · Keep the access clean and dry. · Don't put cream or lotion on or near the access. When should you call for help?    Call your doctor now or seek immediate medical care if:    · You have signs of infection, such as:  ? Increased pain, swelling, warmth, or redness around the access. ? Red streaks leading from the access. ? Pus draining from the access. ? A fever.     · You do not feel a pulse in your access. Watch closely for changes in your health, and be sure to contact your doctor if:    · You do not get better as expected. Where can you learn more? Go to [email or web address]://[email or web address]. org and sign in to your [employer] account. Enter L169 in the [employer] box to learn more about \"Hemodialysis Vascular Access: Care Instructions. \"     If you do not have an account, please click on the \"Sign Up Now\" link. Current as of: December 17, 2020               Content Version: 12.8  © 2591-6095 Healthwise, Incorporated. Care instructions adapted under license by [facility] ([facility]). If you have questions about a medical condition or this instruction, always ask your healthcare professional. [place] [address] any warranty or liability for your use of this information.

## 2021-03-15 NOTE — PROGRESS NOTES
Vascular Surgery Outpatient Followup    PCP : Alanna Mccrary MD  Nephrologist : Dr. Alvarenga Roll : CKD Stage IV    2/25/21 L RC AVF (snuff)         HISTORY OF PRESENT ILLNESS:    63 y.o.. male who presents in regards to fu after dialysis access creation. He complains of pain, numbness, shooting pain in first and second finger. He has not been taking percocet - doesn't llike pain medication. He did try neurontin and it put him to sleepy. His pain is constant but has started to become less constant over past couple days. When it occurs it is a 10/10. Worse with movement of his thumb and hand. Did try neurontin once and just put him to sleep -     He has a known hx of CKD Stage IV. He has htn. He has never been on dialysis. He denies family hx of dialysis. He has never had previous tunneled central venous catheters. He denies hx of pacemakers or defibrillators. He is right handed. Hx of lymphedema and venous stasis ulcers last open in 2016.       Past Medical History:        Diagnosis Date    Arthritis     HTN (hypertension)     Keratoconus     Kidney disease     Lymphadenopathy     Migraine     Prolonged emergence from general anesthesia      Past Surgical History:        Procedure Laterality Date    COLONOSCOPY      CORNEAL TRANSPLANT      DIALYSIS FISTULA CREATION Left 2/25/2021    CREATION AV FISTULA LEFT ARM performed by Steffen Frausto MD at [address] [place] [address] N/A 4/7/2019    EGD FOREIGN BODY REMOVAL performed by Dane Loomis MD at [facility] OR     Current Medications:   Current Outpatient Medications   Medication Sig Dispense Refill    febuxostat (ULORIC) 40 MG TABS tablet Take 40 mg by mouth every other day Evening      colchicine (COLCRYS) 0.6 MG tablet Take 0.6 mg by mouth every evening       docusate sodium (COLACE) 100 MG capsule Take 100 mg by mouth every evening       dicyclomine (BENTYL) 20 service: Not on file     Active member of club or organization: Not on file     Attends meetings of clubs or organizations: Not on file     Relationship status: Not on file    Intimate partner violence     Fear of current or ex partner: Not on file     Emotionally abused: Not on file     Physically abused: Not on file     Forced sexual activity: Not on file   Other Topics Concern    Not on file   Social History Narrative    Not on file     History reviewed. No pertinent family history.   Labs  Lab Results   Component Value Date    WBC 7.1 02/23/2021    HGB 14.0 02/23/2021    HCT 43.7 02/23/2021     02/23/2021    PROTIME 11.2 02/23/2021    INR 0.9 02/23/2021    K 3.7 02/25/2021    BUN 52 (H) 02/25/2021    CREATININE 2.6 (H) 02/25/2021       PHYSICAL EXAM:    Ht 5' 7\" (1.702 m)   Wt 265 lb (120.2 kg)   BMI 41.50 kg/m²   Gen NAD Awake and Alert  left Upper Extremity  · Fistula has palpable  thrill, and a bruit  is ausculatated but runs deep  · Brachial 2+ Radial 1+, ulnar and palmar strongly biphasic  · Wound is clean, dry and intact with no evidence of cellulitis or drainage  · Pain with rom of thumb and second finger    A/P L RC AVF  CKD Stg IV  · Fistula is patent and has good flow  · Fu in 3 weeks - likely will need fistula elevated  · Ecnouraged rom excecises, movement for finger pain  · Encouraged to try pain medication  · Likely will improve with time though possible it may not  · All ? answered    Deisy Ferreira

## 2021-04-09 ENCOUNTER — TELEPHONE (OUTPATIENT)
Dept: VASCULAR SURGERY | Age: 66
End: 2021-04-09

## 2021-04-12 ENCOUNTER — OFFICE VISIT (OUTPATIENT)
Dept: VASCULAR SURGERY | Age: 66
End: 2021-04-12

## 2021-04-12 DIAGNOSIS — N18.6 ENCOUNTER REGARDING VASCULAR ACCESS FOR DIALYSIS FOR ESRD (HCC): Primary | ICD-10-CM

## 2021-04-12 DIAGNOSIS — Z99.2 ENCOUNTER REGARDING VASCULAR ACCESS FOR DIALYSIS FOR ESRD (HCC): Primary | ICD-10-CM

## 2021-04-12 PROCEDURE — 99024 POSTOP FOLLOW-UP VISIT: CPT | Performed by: PHYSICIAN ASSISTANT

## 2021-04-12 NOTE — PROGRESS NOTES
Vascular Surgery Outpatient Followup    PCP : Malcolm Nixon MD  Nephrologist : Dr. Quan Adkins : CKD Stage IV    2/25/21 L RC AVF (snuff)     HISTORY OF PRESENT ILLNESS:    63 y.o.. male who presents in regards to fu after dialysis access creation. Pt was having numbness and shooting pain to his L thumb and index finger since the procedure that was a 10/10, worse with movement of the fingers. Pt now notes his pain is much improved. No longer has shooting pain, but does have some remaining numbness to the thumb and finger which is not very bothersome. He has a known hx of CKD Stage IV. He has htn. He has never been on dialysis. He denies family hx of dialysis. He has never had previous tunneled central venous catheters. He denies hx of pacemakers or defibrillators. He is right handed. Hx of lymphedema and venous stasis ulcers last open in 2016.       Past Medical History:        Diagnosis Date    Arthritis     HTN (hypertension)     Keratoconus     Kidney disease     Lymphadenopathy     Migraine     Prolonged emergence from general anesthesia      Past Surgical History:        Procedure Laterality Date    COLONOSCOPY      CORNEAL TRANSPLANT      DIALYSIS FISTULA CREATION Left 2/25/2021    CREATION AV FISTULA LEFT ARM performed by Gagan Alvarez MD at [address] [place] [address] N/A 4/7/2019    EGD FOREIGN BODY REMOVAL performed by Yazan Felder MD at [facility] OR     Current Medications:   Current Outpatient Medications   Medication Sig Dispense Refill    febuxostat (ULORIC) 40 MG TABS tablet Take 40 mg by mouth every other day Evening      colchicine (COLCRYS) 0.6 MG tablet Take 0.6 mg by mouth every evening       docusate sodium (COLACE) 100 MG capsule Take 100 mg by mouth every evening       dicyclomine (BENTYL) 20 MG tablet Take 40 mg by mouth 3 times daily       gabapentin (NEURONTIN) 300 MG capsule Take 300 mg by mouth 3 times daily as needed.  propranolol-hydrochlorothiazide (INDERIDE) 40-25 MG per tablet Take 1 tablet by mouth every evening       amLODIPine (NORVASC) 10 MG tablet Take 10 mg by mouth every evening       benazepril (LOTENSIN) 40 MG tablet Take 40 mg by mouth every hour as needed       furosemide (LASIX) 20 MG tablet Take 20 mg by mouth every evening       topiramate (TOPAMAX) 25 MG tablet Take 25 mg by mouth every evening       SUMAtriptan (IMITREX) 25 MG tablet Take 25 mg by mouth 2 times daily as needed for Migraine      potassium chloride (KLOR-CON M) 20 MEQ extended release tablet Take 20 mEq by mouth every evening       Cholecalciferol (VITAMIN D3) 2000 units CAPS Take 1 capsule by mouth every evening       Coenzyme Q10 (COQ10) 100 MG CAPS Take 1 capsule by mouth every evening        No current facility-administered medications for this visit.       Allergies:  Codeine and Morphine  Social History     Socioeconomic History    Marital status:      Spouse name: Not on file    Number of children: Not on file    Years of education: Not on file    Highest education level: Not on file   Occupational History    Not on file   Social Needs    Financial resource strain: Not on file    Food insecurity     Worry: Not on file     Inability: Not on file    Transportation needs     Medical: Not on file     Non-medical: Not on file   Tobacco Use    Smoking status: Never Smoker    Smokeless tobacco: Never Used   Substance and Sexual Activity    Alcohol use: Never     Frequency: Never    Drug use: Never    Sexual activity: Not on file   Lifestyle    Physical activity     Days per week: Not on file     Minutes per session: Not on file    Stress: Not on file   Relationships    Social connections     Talks on phone: Not on file     Gets together: Not on file     Attends [employer] service: Not on file     Active member of club or organization: Not on file     Attends meetings of

## 2021-04-23 ENCOUNTER — HOSPITAL ENCOUNTER (OUTPATIENT)
Age: 66
Discharge: HOME | End: 2021-04-25
Payer: MEDICARE

## 2021-04-23 DIAGNOSIS — Z01.818 PREOP TESTING: ICD-10-CM

## 2021-04-23 PROCEDURE — U0003 INFECTIOUS AGENT DETECTION BY NUCLEIC ACID (DNA OR RNA); SEVERE ACUTE RESPIRATORY SYNDROME CORONAVIRUS 2 (SARS-COV-2) (CORONAVIRUS DISEASE [COVID-19]), AMPLIFIED PROBE TECHNIQUE, MAKING USE OF HIGH THROUGHPUT TECHNOLOGIES AS DESCRIBED BY CMS-2020-01-R: HCPCS

## 2021-04-25 LAB
SARS-COV-2: NOT DETECTED
SOURCE: NORMAL

## 2021-04-27 ENCOUNTER — ANESTHESIA EVENT (OUTPATIENT)
Dept: OPERATING ROOM | Age: 66
End: 2021-04-27
Payer: MEDICARE

## 2021-04-27 RX ORDER — HYDROCHLOROTHIAZIDE 25 MG/1
25 TABLET ORAL EVERY EVENING
COMMUNITY

## 2021-04-27 RX ORDER — PROPRANOLOL HYDROCHLORIDE 40 MG/1
40 TABLET ORAL EVERY EVENING
COMMUNITY

## 2021-04-27 ASSESSMENT — LIFESTYLE VARIABLES: SMOKING_STATUS: 0

## 2021-04-27 ASSESSMENT — ENCOUNTER SYMPTOMS: SHORTNESS OF BREATH: 1

## 2021-04-28 ENCOUNTER — HOSPITAL ENCOUNTER (OUTPATIENT)
Dept: PREADMISSION TESTING | Age: 66
Discharge: HOME | End: 2021-04-28
Payer: MEDICARE

## 2021-04-28 LAB
ABO/RH: NORMAL
ANION GAP SERPL CALCULATED.3IONS-SCNC: 8 MMOL/L (ref 7–16)
ANTIBODY SCREEN: NORMAL
BUN BLDV-MCNC: 29 MG/DL (ref 6–23)
CALCIUM SERPL-MCNC: 8.8 MG/DL (ref 8.6–10.2)
CHLORIDE BLD-SCNC: 105 MMOL/L (ref 98–107)
CO2: 29 MMOL/L (ref 22–29)
CREAT SERPL-MCNC: 2.2 MG/DL (ref 0.7–1.2)
GFR AFRICAN AMERICAN: 37
GFR NON-AFRICAN AMERICAN: 30 ML/MIN/1.73
GLUCOSE BLD-MCNC: 104 MG/DL (ref 74–99)
HCT VFR BLD CALC: 42.8 % (ref 37–54)
HEMOGLOBIN: 13.5 G/DL (ref 12.5–16.5)
INR BLD: 1
MCH RBC QN AUTO: 28 PG (ref 26–35)
MCHC RBC AUTO-ENTMCNC: 31.5 % (ref 32–34.5)
MCV RBC AUTO: 88.6 FL (ref 80–99.9)
PDW BLD-RTO: 14.8 FL (ref 11.5–15)
PLATELET # BLD: 159 E9/L (ref 130–450)
PMV BLD AUTO: 11.5 FL (ref 7–12)
POTASSIUM REFLEX MAGNESIUM: 3.9 MMOL/L (ref 3.5–5)
PROTHROMBIN TIME: 11 SEC (ref 9.3–12.4)
RBC # BLD: 4.83 E12/L (ref 3.8–5.8)
SODIUM BLD-SCNC: 142 MMOL/L (ref 132–146)
WBC # BLD: 7.1 E9/L (ref 4.5–11.5)

## 2021-04-28 PROCEDURE — 86850 RBC ANTIBODY SCREEN: CPT

## 2021-04-28 PROCEDURE — 80048 BASIC METABOLIC PNL TOTAL CA: CPT

## 2021-04-28 PROCEDURE — 85610 PROTHROMBIN TIME: CPT

## 2021-04-28 PROCEDURE — 36415 COLL VENOUS BLD VENIPUNCTURE: CPT

## 2021-04-28 PROCEDURE — 86900 BLOOD TYPING SEROLOGIC ABO: CPT

## 2021-04-28 PROCEDURE — 86901 BLOOD TYPING SEROLOGIC RH(D): CPT

## 2021-04-28 PROCEDURE — 85027 COMPLETE CBC AUTOMATED: CPT

## 2021-04-28 NOTE — ANESTHESIA PRE PROCEDURE
Department of Anesthesiology  Preprocedure Note       Name:  Khloe Ohara   Age:  63 y.o.  :  1955                                          MRN:  [id number]         Date:  2021      Surgeon: Traci Hunter):  Lindsay Sánchez MD    Procedure: Procedure(s):  REVISION AV FISTULA LEFT ARM    Medications prior to admission:   Prior to Admission medications    Medication Sig Start Date End Date Taking? Authorizing Provider   propranolol (INDERAL) 40 MG tablet Take 40 mg by mouth every evening    Historical Provider, MD   hydroCHLOROthiazide (HYDRODIURIL) 25 MG tablet Take 25 mg by mouth every evening    Historical Provider, MD   febuxostat (ULORIC) 40 MG TABS tablet Take 40 mg by mouth every other day Evening  NOT TAKING    Historical Provider, MD   colchicine (COLCRYS) 0.6 MG tablet Take 0.6 mg by mouth every evening     Historical Provider, MD   docusate sodium (COLACE) 100 MG capsule Take 100 mg by mouth every evening     Historical Provider, MD   dicyclomine (BENTYL) 20 MG tablet Take 40 mg by mouth 3 times daily     Historical Provider, MD   gabapentin (NEURONTIN) 300 MG capsule Take 300 mg by mouth 3 times daily as needed (for sciatica or back pain.). Instructed to take with sip water am of procedure, if needed.     Historical Provider, MD   propranolol-hydrochlorothiazide (INDERIDE) 40-25 MG per tablet Take 1 tablet by mouth every evening NOT TAKING    Historical Provider, MD   amLODIPine (NORVASC) 10 MG tablet Take 10 mg by mouth every evening     Historical Provider, MD   benazepril (LOTENSIN) 40 MG tablet Take 40 mg by mouth daily     Historical Provider, MD   furosemide (LASIX) 20 MG tablet Take 20 mg by mouth every evening     Historical Provider, MD   topiramate (TOPAMAX) 25 MG tablet Take 25 mg by mouth every evening     Historical Provider, MD   SUMAtriptan (IMITREX) 25 MG tablet Take 25 mg by mouth 2 times daily as needed for Migraine    Historical Provider, MD   potassium chloride (KLOR-CON M) 20 MEQ extended release tablet Take 20 mEq by mouth every evening     Historical Provider, MD   Cholecalciferol (VITAMIN D3) 2000 units CAPS Take 1 capsule by mouth every evening     Historical Provider, MD   Coenzyme Q10 (COQ10) 100 MG CAPS Take 1 capsule by mouth every evening     Historical Provider, MD       Current medications:    Current Outpatient Medications   Medication Sig Dispense Refill    propranolol (INDERAL) 40 MG tablet Take 40 mg by mouth every evening      hydroCHLOROthiazide (HYDRODIURIL) 25 MG tablet Take 25 mg by mouth every evening      febuxostat (ULORIC) 40 MG TABS tablet Take 40 mg by mouth every other day Evening  NOT TAKING      colchicine (COLCRYS) 0.6 MG tablet Take 0.6 mg by mouth every evening       docusate sodium (COLACE) 100 MG capsule Take 100 mg by mouth every evening       dicyclomine (BENTYL) 20 MG tablet Take 40 mg by mouth 3 times daily       gabapentin (NEURONTIN) 300 MG capsule Take 300 mg by mouth 3 times daily as needed (for sciatica or back pain.). Instructed to take with sip water am of procedure, if needed.  propranolol-hydrochlorothiazide (INDERIDE) 40-25 MG per tablet Take 1 tablet by mouth every evening NOT TAKING      amLODIPine (NORVASC) 10 MG tablet Take 10 mg by mouth every evening       benazepril (LOTENSIN) 40 MG tablet Take 40 mg by mouth daily       furosemide (LASIX) 20 MG tablet Take 20 mg by mouth every evening       topiramate (TOPAMAX) 25 MG tablet Take 25 mg by mouth every evening       SUMAtriptan (IMITREX) 25 MG tablet Take 25 mg by mouth 2 times daily as needed for Migraine      potassium chloride (KLOR-CON M) 20 MEQ extended release tablet Take 20 mEq by mouth every evening       Cholecalciferol (VITAMIN D3) 2000 units CAPS Take 1 capsule by mouth every evening       Coenzyme Q10 (COQ10) 100 MG CAPS Take 1 capsule by mouth every evening        No current facility-administered medications for this visit. Allergies: Allergies   Allergen Reactions    Codeine      Constipation      Morphine      constipation       Problem List:    Patient Active Problem List   Diagnosis Code    Foreign body in esophagus T18.108A    Encounter regarding vascular access for dialysis for ESRD ([facility] [address] N18.6, Z99.2    CKD (chronic kidney disease) stage 4, GFR 15-29 ml/min ([facility]) N18.4    AVF (arteriovenous fistula) ([facility]) I77.0       Past Medical History:        Diagnosis Date    A-V fistula ([facility] [address]     left arm    Arthritis     Back pain 2019    had LESI    CKD (chronic kidney disease)     stage 4    HTN (hypertension)     Keratoconus     bilateral    Kidney disease     Lymphadenopathy     lower legs    Migraine     Prolonged emergence from general anesthesia        Past Surgical History:        Procedure Laterality Date    COLONOSCOPY      CORNEAL TRANSPLANT  05/2001    right eye    DIALYSIS FISTULA CREATION Left 2/25/2021    CREATION AV FISTULA LEFT ARM performed by Laverne Keller MD at [address]  05/2002    lap tricia    UPPER GASTROINTESTINAL ENDOSCOPY N/A 4/7/2019    EGD FOREIGN BODY REMOVAL performed by Cleven Felty, MD at [address] History:    Social History     Tobacco Use    Smoking status: Never Smoker    Smokeless tobacco: Never Used   Substance Use Topics    Alcohol use: Never     Frequency: Never                                Counseling given: Not Answered      Vital Signs (Current): There were no vitals filed for this visit.                                            BP Readings from Last 3 Encounters:   02/25/21 (!) 111/58   02/25/21 106/64   02/23/21 118/60       NPO Status:  greater than 8 hours                                                                               BMI:   Wt Readings from Last 3 Encounters:   03/15/21 265 lb (120.2 kg)   02/25/21 268 lb (121.6 kg)   02/23/21 268 lb (121.6 kg)     There is no height or weight on file to calculate BMI.    CBC: Lab Results   Component Value Date    WBC 7.1 02/23/2021    RBC 4.95 02/23/2021    HGB 14.0 02/23/2021    HCT 43.7 02/23/2021    MCV 88.3 02/23/2021    RDW 14.5 02/23/2021     02/23/2021       CMP:   Lab Results   Component Value Date     02/25/2021    K 3.7 02/25/2021    K 3.5 02/23/2021     02/25/2021    CO2 26 02/25/2021    BUN 52 02/25/2021    CREATININE 2.6 02/25/2021    GFRAA 30 02/25/2021    LABGLOM 25 02/25/2021    GLUCOSE 116 02/25/2021    PROT 7.4 04/25/2019    CALCIUM 9.0 02/25/2021    BILITOT 0.4 04/25/2019    ALKPHOS 62 04/25/2019    AST 17 04/25/2019    ALT 23 04/25/2019       POC Tests: No results for input(s): POCGLU, POCNA, POCK, POCCL, POCBUN, POCHEMO, POCHCT in the last 72 hours. Coags:   Lab Results   Component Value Date    PROTIME 11.2 02/23/2021    INR 0.9 02/23/2021       HCG (If Applicable): No results found for: PREGTESTUR, PREGSERUM, HCG, HCGQUANT     ABGs: No results found for: PHART, PO2ART, MTM6IFO, RMF9AXW, BEART, B7VZZBIY     Type & Screen (If Applicable):  No results found for: LABABO, LABRH    Drug/Infectious Status (If Applicable):  No results found for: HIV, HEPCAB    COVID-19 Screening (If Applicable):   Lab Results   Component Value Date    COVID19 Not Detected 04/23/2021         Anesthesia Evaluation  Patient summary reviewed and Nursing notes reviewed history of anesthetic complications (difficulty awakening after general anesthesia):    Airway: Mallampati: II  TM distance: >3 FB   Neck ROM: full  Comment: beard  Mouth opening: > = 3 FB Dental: normal exam         Pulmonary:normal exam    (+) shortness of breath:  sleep apnea: on CPAP and noncompliant,      (-) not a current smoker                           Cardiovascular:    (+) hypertension:,                   Neuro/Psych:   (+) headaches: migraine headaches,              ROS comment: Claustrophobia- worsened when lying on his back GI/Hepatic/Renal:   (+) renal disease: ESRD and CRI, morbid obesity Endo/Other:    (+) : arthritis:., .    (-) diabetes mellitus                ROS comment: Bilateral lower extremity lymphedema Abdominal:           Vascular:           ROS comment: S/p AV fistula creation in February 2021. Anesthesia Plan      MAC, regional and general     ASA 4       Induction: intravenous. Anesthetic plan and risks discussed with patient. Plan discussed with CRNA and surgical team.    Attending anesthesiologist reviewed and agrees with Pre Eval content      Patient was not able to be seen in PAT office today. Spoke to him by phone after he got home. He is agreeable to a preop nerve block with sedation or general anesthesia from the start. He had the left upper extremity AV fistula done in late February 2021. He had a nerve block done preop but was unable to tolerate the position and sedation in the OR. Therefore, he was converted to general anesthesia. Explained to patient that he will talk to the anesthesiologist taking care of him on the morning of surgery to decide whether a nerve block will be done and whether or not to proceed with general anesthesia. Efren Giang MD   4/27/2021        DOS STAFF ADDENDUM:    Pt seen and examined, physical exam updated, chart reviewed including anesthesia, drug and allergy history. H&P reviewed. No interval changes to history or physical examination (unless noted above). NPO status confirmed. Anesthetic plan, risks, benefits, alternatives discussed with patient. Patient verbalized an understanding and agrees to proceed.      Bradley Cobb MD  Anesthesiologist

## 2021-04-29 ENCOUNTER — ANESTHESIA (OUTPATIENT)
Dept: OPERATING ROOM | Age: 66
End: 2021-04-29
Payer: MEDICARE

## 2021-04-29 ENCOUNTER — HOSPITAL ENCOUNTER (OUTPATIENT)
Age: 66
Setting detail: OUTPATIENT SURGERY
Discharge: HOME | End: 2021-04-29
Attending: SURGERY | Admitting: SURGERY
Payer: MEDICARE

## 2021-04-29 ENCOUNTER — APPOINTMENT (OUTPATIENT)
Dept: GENERAL RADIOLOGY | Age: 66
End: 2021-04-29
Attending: SURGERY
Payer: MEDICARE

## 2021-04-29 VITALS
TEMPERATURE: 97.2 F | DIASTOLIC BLOOD PRESSURE: 65 MMHG | HEART RATE: 56 BPM | WEIGHT: 270 LBS | SYSTOLIC BLOOD PRESSURE: 115 MMHG | OXYGEN SATURATION: 93 % | HEIGHT: 67 IN | BODY MASS INDEX: 42.38 KG/M2 | RESPIRATION RATE: 18 BRPM

## 2021-04-29 VITALS — OXYGEN SATURATION: 91 % | SYSTOLIC BLOOD PRESSURE: 113 MMHG | DIASTOLIC BLOOD PRESSURE: 69 MMHG

## 2021-04-29 DIAGNOSIS — Z01.818 PREOP TESTING: Primary | ICD-10-CM

## 2021-04-29 DIAGNOSIS — N18.4 CKD (CHRONIC KIDNEY DISEASE) STAGE 4, GFR 15-29 ML/MIN (HCC): ICD-10-CM

## 2021-04-29 DIAGNOSIS — I77.0 AVF (ARTERIOVENOUS FISTULA) (HCC): ICD-10-CM

## 2021-04-29 DIAGNOSIS — N18.6 ENCOUNTER REGARDING VASCULAR ACCESS FOR DIALYSIS FOR ESRD (HCC): ICD-10-CM

## 2021-04-29 DIAGNOSIS — Z99.2 ENCOUNTER REGARDING VASCULAR ACCESS FOR DIALYSIS FOR ESRD (HCC): ICD-10-CM

## 2021-04-29 PROCEDURE — 2500000003 HC RX 250 WO HCPCS: Performed by: NURSE ANESTHETIST, CERTIFIED REGISTERED

## 2021-04-29 PROCEDURE — 2500000003 HC RX 250 WO HCPCS: Performed by: SURGERY

## 2021-04-29 PROCEDURE — 2709999900 HC NON-CHARGEABLE SUPPLY: Performed by: SURGERY

## 2021-04-29 PROCEDURE — 3600000013 HC SURGERY LEVEL 3 ADDTL 15MIN: Performed by: SURGERY

## 2021-04-29 PROCEDURE — 3600000003 HC SURGERY LEVEL 3 BASE: Performed by: SURGERY

## 2021-04-29 PROCEDURE — 64415 NJX AA&/STRD BRCH PLXS IMG: CPT | Performed by: ANESTHESIOLOGY

## 2021-04-29 PROCEDURE — 6360000002 HC RX W HCPCS: Performed by: NURSE ANESTHETIST, CERTIFIED REGISTERED

## 2021-04-29 PROCEDURE — 6360000002 HC RX W HCPCS: Performed by: SURGERY

## 2021-04-29 PROCEDURE — 2580000003 HC RX 258: Performed by: NURSE PRACTITIONER

## 2021-04-29 PROCEDURE — 36832 AV FISTULA REVISION OPEN: CPT | Performed by: SURGERY

## 2021-04-29 PROCEDURE — 2580000003 HC RX 258: Performed by: SURGERY

## 2021-04-29 PROCEDURE — 7100000011 HC PHASE II RECOVERY - ADDTL 15 MIN: Performed by: SURGERY

## 2021-04-29 PROCEDURE — 3700000000 HC ANESTHESIA ATTENDED CARE: Performed by: SURGERY

## 2021-04-29 PROCEDURE — 36832 AV FISTULA REVISION OPEN: CPT | Performed by: NURSE PRACTITIONER

## 2021-04-29 PROCEDURE — 7100000000 HC PACU RECOVERY - FIRST 15 MIN: Performed by: SURGERY

## 2021-04-29 PROCEDURE — 7100000001 HC PACU RECOVERY - ADDTL 15 MIN: Performed by: SURGERY

## 2021-04-29 PROCEDURE — 6360000002 HC RX W HCPCS: Performed by: NURSE PRACTITIONER

## 2021-04-29 PROCEDURE — 6360000002 HC RX W HCPCS: Performed by: ANESTHESIOLOGY

## 2021-04-29 PROCEDURE — 7100000010 HC PHASE II RECOVERY - FIRST 15 MIN: Performed by: SURGERY

## 2021-04-29 PROCEDURE — 3700000001 HC ADD 15 MINUTES (ANESTHESIA): Performed by: SURGERY

## 2021-04-29 RX ORDER — PROPOFOL 10 MG/ML
INJECTION, EMULSION INTRAVENOUS PRN
Status: DISCONTINUED | OUTPATIENT
Start: 2021-04-29 | End: 2021-04-29 | Stop reason: SDUPTHER

## 2021-04-29 RX ORDER — ROPIVACAINE HYDROCHLORIDE 5 MG/ML
INJECTION, SOLUTION EPIDURAL; INFILTRATION; PERINEURAL
Status: DISPENSED
Start: 2021-04-29 | End: 2021-04-29

## 2021-04-29 RX ORDER — FENTANYL CITRATE 50 UG/ML
INJECTION, SOLUTION INTRAMUSCULAR; INTRAVENOUS
Status: DISPENSED
Start: 2021-04-29 | End: 2021-04-29

## 2021-04-29 RX ORDER — SODIUM CHLORIDE 0.9 % (FLUSH) 0.9 %
5-40 SYRINGE (ML) INJECTION EVERY 12 HOURS SCHEDULED
Status: DISCONTINUED | OUTPATIENT
Start: 2021-04-29 | End: 2021-04-29 | Stop reason: HOSPADM

## 2021-04-29 RX ORDER — ROPIVACAINE HYDROCHLORIDE 5 MG/ML
30 INJECTION, SOLUTION EPIDURAL; INFILTRATION; PERINEURAL ONCE
Status: COMPLETED | OUTPATIENT
Start: 2021-04-29 | End: 2021-04-29

## 2021-04-29 RX ORDER — FENTANYL CITRATE 50 UG/ML
INJECTION, SOLUTION INTRAMUSCULAR; INTRAVENOUS PRN
Status: DISCONTINUED | OUTPATIENT
Start: 2021-04-29 | End: 2021-04-29 | Stop reason: SDUPTHER

## 2021-04-29 RX ORDER — ALLOPURINOL 100 MG/1
100 TABLET ORAL DAILY
COMMUNITY

## 2021-04-29 RX ORDER — SODIUM CHLORIDE 9 MG/ML
25 INJECTION, SOLUTION INTRAVENOUS PRN
Status: DISCONTINUED | OUTPATIENT
Start: 2021-04-29 | End: 2021-04-29 | Stop reason: HOSPADM

## 2021-04-29 RX ORDER — FENTANYL CITRATE 50 UG/ML
100 INJECTION, SOLUTION INTRAMUSCULAR; INTRAVENOUS ONCE
Status: COMPLETED | OUTPATIENT
Start: 2021-04-29 | End: 2021-04-29

## 2021-04-29 RX ORDER — MIDAZOLAM HYDROCHLORIDE 1 MG/ML
INJECTION INTRAMUSCULAR; INTRAVENOUS
Status: DISPENSED
Start: 2021-04-29 | End: 2021-04-29

## 2021-04-29 RX ORDER — MIDAZOLAM HYDROCHLORIDE 2 MG/2ML
2 INJECTION, SOLUTION INTRAMUSCULAR; INTRAVENOUS ONCE
Status: COMPLETED | OUTPATIENT
Start: 2021-04-29 | End: 2021-04-29

## 2021-04-29 RX ORDER — EPHEDRINE SULFATE/0.9% NACL/PF 50 MG/5 ML
SYRINGE (ML) INTRAVENOUS PRN
Status: DISCONTINUED | OUTPATIENT
Start: 2021-04-29 | End: 2021-04-29 | Stop reason: SDUPTHER

## 2021-04-29 RX ORDER — SODIUM CHLORIDE 9 MG/ML
INJECTION, SOLUTION INTRAVENOUS CONTINUOUS
Status: DISCONTINUED | OUTPATIENT
Start: 2021-04-29 | End: 2021-04-29 | Stop reason: HOSPADM

## 2021-04-29 RX ORDER — OXYCODONE HYDROCHLORIDE AND ACETAMINOPHEN 5; 325 MG/1; MG/1
1 TABLET ORAL EVERY 6 HOURS PRN
Qty: 25 TABLET | Refills: 0 | Status: SHIPPED | OUTPATIENT
Start: 2021-04-29 | End: 2021-05-13

## 2021-04-29 RX ORDER — SODIUM CHLORIDE 0.9 % (FLUSH) 0.9 %
5-40 SYRINGE (ML) INJECTION PRN
Status: DISCONTINUED | OUTPATIENT
Start: 2021-04-29 | End: 2021-04-29 | Stop reason: HOSPADM

## 2021-04-29 RX ORDER — PROPOFOL 10 MG/ML
INJECTION, EMULSION INTRAVENOUS CONTINUOUS PRN
Status: DISCONTINUED | OUTPATIENT
Start: 2021-04-29 | End: 2021-04-29 | Stop reason: SDUPTHER

## 2021-04-29 RX ADMIN — SODIUM CHLORIDE: 9 INJECTION, SOLUTION INTRAVENOUS at 09:51

## 2021-04-29 RX ADMIN — FENTANYL CITRATE 50 MCG: 50 INJECTION, SOLUTION INTRAMUSCULAR; INTRAVENOUS at 09:25

## 2021-04-29 RX ADMIN — FENTANYL CITRATE 100 MCG: 50 INJECTION, SOLUTION INTRAMUSCULAR; INTRAVENOUS at 07:15

## 2021-04-29 RX ADMIN — FENTANYL CITRATE 50 MCG: 50 INJECTION, SOLUTION INTRAMUSCULAR; INTRAVENOUS at 09:16

## 2021-04-29 RX ADMIN — MIDAZOLAM HYDROCHLORIDE 2 MG: 1 INJECTION, SOLUTION INTRAMUSCULAR; INTRAVENOUS at 07:15

## 2021-04-29 RX ADMIN — ROPIVACAINE HYDROCHLORIDE 30 ML: 5 INJECTION, SOLUTION EPIDURAL; INFILTRATION; PERINEURAL at 07:20

## 2021-04-29 RX ADMIN — CEFAZOLIN 3000 MG: 10 INJECTION, POWDER, FOR SOLUTION INTRAVENOUS at 09:16

## 2021-04-29 RX ADMIN — PROPOFOL 40 MG: 10 INJECTION, EMULSION INTRAVENOUS at 11:12

## 2021-04-29 RX ADMIN — PROPOFOL 50 MG: 10 INJECTION, EMULSION INTRAVENOUS at 09:21

## 2021-04-29 RX ADMIN — Medication 10 MG: at 09:54

## 2021-04-29 RX ADMIN — PROPOFOL 140 MCG/KG/MIN: 10 INJECTION, EMULSION INTRAVENOUS at 09:21

## 2021-04-29 ASSESSMENT — PULMONARY FUNCTION TESTS
PIF_VALUE: 12
PIF_VALUE: 11
PIF_VALUE: 17
PIF_VALUE: 12
PIF_VALUE: 16
PIF_VALUE: 1
PIF_VALUE: 16
PIF_VALUE: 12
PIF_VALUE: 12
PIF_VALUE: 13
PIF_VALUE: 1
PIF_VALUE: 12
PIF_VALUE: 16
PIF_VALUE: 17
PIF_VALUE: 8
PIF_VALUE: 12
PIF_VALUE: 11
PIF_VALUE: 17
PIF_VALUE: 12
PIF_VALUE: 12
PIF_VALUE: 13
PIF_VALUE: 16
PIF_VALUE: 12
PIF_VALUE: 16
PIF_VALUE: 12
PIF_VALUE: 16
PIF_VALUE: 13
PIF_VALUE: 17
PIF_VALUE: 12
PIF_VALUE: 16
PIF_VALUE: 12
PIF_VALUE: 13
PIF_VALUE: 12
PIF_VALUE: 1
PIF_VALUE: 16
PIF_VALUE: 13
PIF_VALUE: 11
PIF_VALUE: 12
PIF_VALUE: 13
PIF_VALUE: 12
PIF_VALUE: 18
PIF_VALUE: 12
PIF_VALUE: 12
PIF_VALUE: 16
PIF_VALUE: 12
PIF_VALUE: 13
PIF_VALUE: 16
PIF_VALUE: 12

## 2021-04-29 ASSESSMENT — PAIN - FUNCTIONAL ASSESSMENT
PAIN_FUNCTIONAL_ASSESSMENT: ACTIVITIES ARE NOT PREVENTED
PAIN_FUNCTIONAL_ASSESSMENT: 0-10

## 2021-04-29 ASSESSMENT — PAIN DESCRIPTION - LOCATION: LOCATION: BACK

## 2021-04-29 ASSESSMENT — PAIN DESCRIPTION - PAIN TYPE: TYPE: CHRONIC PAIN

## 2021-04-29 ASSESSMENT — PAIN DESCRIPTION - ORIENTATION: ORIENTATION: LOWER

## 2021-04-29 NOTE — ANESTHESIA PROCEDURE NOTES
Peripheral Block    Patient location during procedure: procedure area  Start time: 4/29/2021 7:15 AM  End time: 4/29/2021 7:21 AM  Staffing  Performed: anesthesiologist   Anesthesiologist: Misty Palacios MD  Preanesthetic Checklist  Completed: patient identified, IV checked, site marked, risks and benefits discussed, surgical consent, monitors and equipment checked, pre-op evaluation, timeout performed, anesthesia consent given, oxygen available and patient being monitored  Peripheral Block  Patient position: supine  Prep: ChloraPrep  Patient monitoring: cardiac monitor, continuous pulse ox, frequent blood pressure checks and IV access  Block type: Brachial plexus  Laterality: left  Injection technique: single-shot  Guidance: nerve stimulator and ultrasound guided  Local infiltration: ropivacaine  Infiltration strength: 0.5 %  Dose: 30 mL  Supraclavicular  Provider prep: mask and sterile gloves  Local infiltration: ropivacaine  Needle  Needle type: combined needle/nerve stimulator   Needle gauge: 21 G  Needle length: 2 inch. Needle localization: nerve stimulator and ultrasound guidance  Assessment  Injection assessment: negative aspiration for heme, no paresthesia on injection and local visualized surrounding nerve on ultrasound  Paresthesia pain: none  Slow fractionated injection: yes  Hemodynamics: stable  Additional Notes  Patient tolerated procedure well. VSS.   Versed 2 mg and fentanyl 100 mcg IV given for sedation for block  Reason for block: post-op pain management, primary anesthetic and at surgeon's request

## 2021-04-29 NOTE — PROGRESS NOTES
Have you been tested for COVID  Yes           Have you been told you were positive for COVID No  Have you had any known exposure to someone that is positive for COVID No  Do you have a cough                   No              Do you have shortness of breath No                 Do you have a sore throat            No                Are you having chills                    No                Are you having muscle aches. No                    Please come to the hospital wearing a mask and have your significant other wear a mask as well. Both of you should check your temperature before leaving to come here,  if it is 100 or higher please call [phone] for instruction.
Left supraclavicular nerve block completed, patient tolerated procedure, wife at bedside
procedure to notify you if your arrival time changes    [x] If you receive a survey after surgery we would greatly appreciate your comments    [] Parent/guardian of a minor must accompany their child and remain on the premises  the entire time they are under our care     [] Pediatric patients may bring favorite toy, blanket or comfort item with them    [] A caregiver or family member must remain with the patient during their stay if they are mentally handicapped, have dementia, disoriented or unable to use a call light or would be a safety concern if left unattended    [x] Please notify surgeon if you develop any illness between now and time of surgery (cold, cough, sore throat, fever, nausea, vomiting) or any signs of infections  including skin, wounds, and dental.    []  The Outpatient Pharmacy is available to fill your prescription here on your day of surgery, ask your preop nurse for details    [] Other instructions    EDUCATIONAL MATERIALS PROVIDED:    [] PAT Preoperative Education Packet/Booklet     [] Medication List    [] Transfusion bracelet applied with instructions    [] Shower with soap, lather and rinse well, and use CHG wipes provided the evening before surgery as instructed    [] Incentive spirometer with instructions

## 2021-04-29 NOTE — OP NOTE
no qualified general surgery resident available.     Tana Ness    PCP : Yudelka Taylor MD  Nephrologist : Dr. Dinah Cabot : CKD Stage IV     2/25/21 L RC AVF (snuff)   4/29/21 L RC AVF revision - superficialization      Electronically signed by Tana Ness MD on 4/29/2021 at 11:01 AM

## 2021-04-29 NOTE — H&P
Vascular Surgery History & Physical Exam      Chief Complaint: CKD    HISTORY OF PRESENT ILLNESS:                The patient is a 63 y.o. male who presents to the hospital for elective revision of a arteriovenous fistula. The patient denies any problems since the last office visit. IMPRESSION:   ESRD    PLAN: Revision of a left upper extremity arteriovenous fistula    I reviewed the procedure with the patient and family as available. I discussed the procedure, risks, benefits, complications, and alternatives of the procedure. They understand and consent.   All questions were answered    ROS : All others Negative if blank [], Positive if [x]  General   [] Fevers   [] Chills   [] Weight Loss   Skin   [] Tissue Loss   Eyes   [x] Wears Glasses/Contacts   [] Vision Changes   Respiratory    [] Shortness of breath   Cardiovascular   [] Chest Pain   [] Shortness of breath with exertion   Gastrointestinal   [] Abdominal Pain     Past Medical History:   Diagnosis Date    A-V fistula ([address]     left arm    Arthritis     Back pain 2019    had LESI    CKD (chronic kidney disease)     stage 4    HTN (hypertension)     Keratoconus     bilateral    Kidney disease     Lymphadenopathy     lower legs    Migraine     Prolonged emergence from general anesthesia      Past Surgical History:   Procedure Laterality Date    COLONOSCOPY      CORNEAL TRANSPLANT  05/2001    right eye    DIALYSIS FISTULA CREATION Left 2/25/2021    CREATION AV FISTULA LEFT ARM performed by Gilberto Tirado MD at [address]  05/2002    lap tricia    UPPER GASTROINTESTINAL ENDOSCOPY N/A 4/7/2019    EGD FOREIGN BODY REMOVAL performed by Ramez Garg MD at [address] [place] [address]     Current Medications:     Current Facility-Administered Medications:     0.9 % sodium chloride infusion, , Intravenous, Continuous, Lemon Nab, APRN - CNP    sodium chloride flush 0.9 % injection 5-40 mL, 5-40 mL, Intravenous, 2 times per day, Sona Evans CONSTITUTIONAL:  awake, alert, cooperative, no apparent distress, and appears stated age  NECK:  supple, symmetrical, trachea midline  LUNGS:  no increased work of breathing, good resp excursion  CARDIOVASCULAR:  regular rate and rhythm   ABDOMEN:  soft, non-distended and non-tender  left upper extremity   Brachial 2+ Radial 1+   + thrill   First and second finger numbness chronic since initial avf creation    LABS:    Lab Results   Component Value Date    WBC 7.1 04/28/2021    HGB 13.5 04/28/2021    HCT 42.8 04/28/2021     04/28/2021    PROTIME 11.0 04/28/2021    INR 1.0 04/28/2021    K 3.9 04/28/2021    BUN 29 (H) 04/28/2021    CREATININE 2.2 (H) 04/28/2021       Dae CHOPRA [facility]

## 2021-05-14 ENCOUNTER — TELEPHONE (OUTPATIENT)
Dept: VASCULAR SURGERY | Age: 66
End: 2021-05-14

## 2021-05-17 ENCOUNTER — OFFICE VISIT (OUTPATIENT)
Dept: VASCULAR SURGERY | Age: 66
End: 2021-05-17

## 2021-05-17 DIAGNOSIS — Z99.2 ENCOUNTER REGARDING VASCULAR ACCESS FOR DIALYSIS FOR ESRD (HCC): Primary | ICD-10-CM

## 2021-05-17 DIAGNOSIS — N18.6 ENCOUNTER REGARDING VASCULAR ACCESS FOR DIALYSIS FOR ESRD (HCC): Primary | ICD-10-CM

## 2021-05-17 PROCEDURE — 99024 POSTOP FOLLOW-UP VISIT: CPT | Performed by: NURSE PRACTITIONER

## 2021-05-17 NOTE — PROGRESS NOTES
Vascular Surgery Outpatient Followup    PCP : Luis Reyes MD  Nephrologist : Dr. Jared Sheridan : CKD Stage IV    2/25/21 L RC AVF (snuff)   4/29/21 Revision of left RC AVF- superficialization, branch ligation     HISTORY OF PRESENT ILLNESS:    63 y.o.. male who presents for postoperative visit. He denies any unexpected problems since the procedure. The wound is healing well. He denies left hand pain. Pt was having numbness and shooting pain to his L thumb and index finger since the initial fistula creation that was a 10/10, worse with movement of the fingers. Pt now notes his pain is much improved. No longer has shooting pain, but does have some remaining numbness to the thumb and index finger which is not very bothersome. He has a known hx of CKD Stage IV. He has htn. He has never been on dialysis. He denies family hx of dialysis. He has never had previous tunneled central venous catheters. He denies hx of pacemakers or defibrillators. He is right handed. Hx of lymphedema and venous stasis ulcers last open in 2016.       Past Medical History:        Diagnosis Date    A-V fistula ([address]     left arm    Arthritis     Back pain 2019    had LESI    CKD (chronic kidney disease)     stage 4    HTN (hypertension)     Keratoconus     bilateral    Kidney disease     Lymphadenopathy     lower legs    Migraine     Prolonged emergence from general anesthesia      Past Surgical History:        Procedure Laterality Date    COLONOSCOPY      CORNEAL TRANSPLANT  05/2001    right eye    DIALYSIS FISTULA CREATION Left 2/25/2021    CREATION AV FISTULA LEFT ARM performed by Hilda Hines MD at [facility] Left 4/29/2021    REVISION ARTERIOVENOUS FISTULA LEFT ARM performed by Hilda Hines MD at [address]  05/2002    maricel oliviere    UPPER GASTROINTESTINAL ENDOSCOPY N/A 4/7/2019    EGD FOREIGN BODY REMOVAL performed by Olu Protcor MD at [facility] OR     Current Medications:   Current Outpatient Medications   Medication Sig Dispense Refill    allopurinol (ZYLOPRIM) 100 MG tablet Take 100 mg by mouth daily      propranolol (INDERAL) 40 MG tablet Take 40 mg by mouth every evening      hydroCHLOROthiazide (HYDRODIURIL) 25 MG tablet Take 25 mg by mouth every evening      colchicine (COLCRYS) 0.6 MG tablet Take 0.6 mg by mouth every evening       docusate sodium (COLACE) 100 MG capsule Take 100 mg by mouth every evening       dicyclomine (BENTYL) 20 MG tablet Take 40 mg by mouth 3 times daily       gabapentin (NEURONTIN) 300 MG capsule Take 300 mg by mouth 3 times daily as needed (for sciatica or back pain.). Instructed to take with sip water am of procedure, if needed.  amLODIPine (NORVASC) 10 MG tablet Take 10 mg by mouth every evening       benazepril (LOTENSIN) 40 MG tablet Take 40 mg by mouth daily       furosemide (LASIX) 20 MG tablet Take 20 mg by mouth every evening       topiramate (TOPAMAX) 25 MG tablet Take 25 mg by mouth every evening       SUMAtriptan (IMITREX) 25 MG tablet Take 25 mg by mouth 2 times daily as needed for Migraine      potassium chloride (KLOR-CON M) 20 MEQ extended release tablet Take 20 mEq by mouth every evening       Cholecalciferol (VITAMIN D3) 2000 units CAPS Take 1 capsule by mouth every evening       Coenzyme Q10 (COQ10) 100 MG CAPS Take 1 capsule by mouth every evening        No current facility-administered medications for this visit.      Allergies:  Codeine and Morphine  Social History     Socioeconomic History    Marital status:      Spouse name: Not on file    Number of children: Not on file    Years of education: Not on file    Highest education level: Not on file   Occupational History    Not on file   Tobacco Use    Smoking status: Never Smoker    Smokeless tobacco: Never Used   Vaping Use    Vaping Use: Never used   Substance and Sexual Activity    Alcohol use: Never    Drug use: Never    Sexual activity: Not on file   Other Topics Concern    Not on file   Social History Narrative    Not on file     Social Determinants of Health     Financial Resource Strain:     Difficulty of Paying Living Expenses:    Food Insecurity:     Worried About Running Out of Food in the Last Year:     [address] in the Last Year:    Transportation Needs:     Lack of Transportation (Medical):  Lack of Transportation (Non-Medical):    Physical Activity:     Days of Exercise per Week:     Minutes of Exercise per Session:    Stress:     Feeling of Stress :    Social Connections:     Frequency of Communication with Friends and Family:     Frequency of Social Gatherings with Friends and Family:     Attends [employer] Services:     Active Member of Clubs or Organizations:     Attends Club or Organization Meetings:     Marital Status:    Intimate Partner Violence:     Fear of Current or Ex-Partner:     Emotionally Abused:     Physically Abused:     Sexually Abused:      History reviewed. No pertinent family history. Labs  Lab Results   Component Value Date    WBC 7.1 04/28/2021    HGB 13.5 04/28/2021    HCT 42.8 04/28/2021     04/28/2021    PROTIME 11.0 04/28/2021    INR 1.0 04/28/2021    K 3.9 04/28/2021    BUN 29 (H) 04/28/2021    CREATININE 2.2 (H) 04/28/2021       PHYSICAL EXAM:    There were no vitals taken for this visit.   Gen NAD Awake and Alert  left Upper Extremity  · Fistula has palpable thrill, and a bruit is auscultated  · Brachial 2+ Radial 2+, ulnar and palmar strongly biphasic  · Wound is clean, dry and intact with no evidence of cellulitis or drainage  · Mild swelling of left forearm    A/P S/p revision, superficialization L RC AVF   CKD Stg IV  · Fistula is patent   · There is no evidence of steal syndrome  · The access is not yet ready to be used due to swelling  · F/u in 3-4 weeks to allow swelling to resolve   · All ? answered    Pt seen and plan reviewed with Dr. Cristy Coates.      Sharron Ocasio, APRN - CNP

## 2021-06-18 ENCOUNTER — TELEPHONE (OUTPATIENT)
Dept: VASCULAR SURGERY | Age: 66
End: 2021-06-18

## 2021-06-21 ENCOUNTER — OFFICE VISIT (OUTPATIENT)
Dept: VASCULAR SURGERY | Age: 66
End: 2021-06-21

## 2021-06-21 DIAGNOSIS — Z99.2 ENCOUNTER REGARDING VASCULAR ACCESS FOR DIALYSIS FOR ESRD (HCC): Primary | ICD-10-CM

## 2021-06-21 DIAGNOSIS — I77.0 AVF (ARTERIOVENOUS FISTULA) (HCC): ICD-10-CM

## 2021-06-21 DIAGNOSIS — N18.6 ENCOUNTER REGARDING VASCULAR ACCESS FOR DIALYSIS FOR ESRD (HCC): Primary | ICD-10-CM

## 2021-06-21 PROCEDURE — 99024 POSTOP FOLLOW-UP VISIT: CPT | Performed by: SURGERY

## 2021-06-21 NOTE — PROGRESS NOTES
Vascular Surgery Outpatient Followup    PCP : Efren Goodman MD  Nephrologist : Dr. Roxane Aase : CKD Stage IV    2/25/21 L RC AVF (snuff)   4/29/21 Revision of left RC AVF- superficialization, branch ligation     HISTORY OF PRESENT ILLNESS:    63 y.o.. male who presents for postoperative visit. He denies any unexpected problems since the procedure. The wound is healing well. He denies left hand pain. Pt was having numbness and shooting pain to his L thumb and index finger since the initial fistula creation that was a 10/10, worse with movement of the fingers. Pt now notes his pain is much improved. No longer has shooting pain, but does have some remaining numbness to the thumb and index finger which is not very bothersome. He has a known hx of CKD Stage IV. He has htn. He has never been on dialysis. He denies family hx of dialysis. He has never had previous tunneled central venous catheters. He denies hx of pacemakers or defibrillators. He is right handed. Hx of lymphedema and venous stasis ulcers last open in 2016.       Past Medical History:        Diagnosis Date    A-V fistula ([address]     left arm    Arthritis     Back pain 2019    had LESI    CKD (chronic kidney disease)     stage 4    HTN (hypertension)     Keratoconus     bilateral    Kidney disease     Lymphadenopathy     lower legs    Migraine     Prolonged emergence from general anesthesia      Past Surgical History:        Procedure Laterality Date    COLONOSCOPY      CORNEAL TRANSPLANT  05/2001    right eye    DIALYSIS FISTULA CREATION Left 2/25/2021    CREATION AV FISTULA LEFT ARM performed by Du Packer MD at [facility] Left 4/29/2021    REVISION ARTERIOVENOUS FISTULA LEFT ARM performed by Du Packer MD at [facility] [address]  05/2002    maricel clarke    UPPER GASTROINTESTINAL ENDOSCOPY N/A 4/7/2019    EGD FOREIGN BODY REMOVAL performed by spit  · Daily local wound care  · Fu 6 months  · All ?  Irais Macdonald MD

## 2021-06-21 NOTE — PATIENT INSTRUCTIONS
Patient Education        Hemodialysis Vascular Access: Care Instructions  Overview  Hemodialysis, or dialysis, is the use of a machine to remove wastes from your blood. You need it if your kidneys are not able to remove wastes on their own. A dialysis access is the place in your arm, or sometimes in your leg, where a doctor creates a blood vessel that carries a large flow of blood. Your doctor creates an access during a minor surgery. You need to take care of your access to keep it working and to prevent infection. When you have dialysis, two needles are placed in this blood vessel and are connected to the dialysis machine. Your blood flows out of one needle and into the machine to be cleaned. Then your cleaned blood flows back into your body through the other needle. Sometimes a doctor makes a short-term access through a tube, called a catheter, placed in your neck, upper chest, or groin. Follow-up care is a key part of your treatment and safety. Be sure to make and go to all appointments, and call your doctor if you are having problems. It's also a good idea to know your test results and keep a list of the medicines you take. How can you care for yourself at home? · After your doctor creates an access, keep it dry for at least 2 days. · Squeeze a soft ball or other object as instructed after the access is placed. This will help blood flow through the access and help prevent blood clots. · After you have dialysis, check to see if the access bleeds or swells. Let your doctor know if your arm bleeds or swells. · Do not lift anything heavy with the arm that has the access. · Do not bump your arm. · Don't wear tight clothing or jewelry over the access. · Don't sleep with your access arm under your body. · Have blood drawn or blood pressure taken from your other arm. · Keep the access clean and dry. · Don't put cream or lotion on or near the access. When should you call for help?    Call your doctor now or seek immediate medical care if:    · You have signs of infection, such as:  ? Increased pain, swelling, warmth, or redness around the access. ? Red streaks leading from the access. ? Pus draining from the access. ? A fever.     · You do not feel a pulse in your access. Watch closely for changes in your health, and be sure to contact your doctor if:    · You do not get better as expected. Where can you learn more? Go to https://[employer].Subblime. org and sign in to your As Seen on TV account. Enter L169 in the GigaSpaces box to learn more about \"Hemodialysis Vascular Access: Care Instructions. \"     If you do not have an account, please click on the \"Sign Up Now\" link. Current as of: December 17, 2020               Content Version: 12.9  © 4146-6799 [employer]wise, Incorporated. Care instructions adapted under license by [facility] ([facility]). If you have questions about a medical condition or this instruction, always ask your healthcare professional. [place] [address] any warranty or liability for your use of this information.

## 2021-07-23 ENCOUNTER — TELEPHONE (OUTPATIENT)
Dept: VASCULAR SURGERY | Age: 66
End: 2021-07-23

## 2021-07-23 NOTE — TELEPHONE ENCOUNTER
Patient cancelled his 7-26-21 appt with Dr. Sergio Day as his arm is healed now. He does have an appointment in December.

## 2021-07-27 ENCOUNTER — TELEPHONE (OUTPATIENT)
Dept: VASCULAR SURGERY | Age: 66
End: 2021-07-27

## 2021-12-17 ENCOUNTER — TELEPHONE (OUTPATIENT)
Dept: VASCULAR SURGERY | Age: 66
End: 2021-12-17

## 2021-12-20 ENCOUNTER — OFFICE VISIT (OUTPATIENT)
Dept: VASCULAR SURGERY | Age: 66
End: 2021-12-20
Payer: MEDICARE

## 2021-12-20 VITALS
OXYGEN SATURATION: 96 % | RESPIRATION RATE: 16 BRPM | SYSTOLIC BLOOD PRESSURE: 118 MMHG | DIASTOLIC BLOOD PRESSURE: 78 MMHG | WEIGHT: 273 LBS | HEART RATE: 65 BPM | BODY MASS INDEX: 42.85 KG/M2 | HEIGHT: 67 IN

## 2021-12-20 DIAGNOSIS — N18.6 ENCOUNTER REGARDING VASCULAR ACCESS FOR DIALYSIS FOR ESRD (HCC): Primary | ICD-10-CM

## 2021-12-20 DIAGNOSIS — Z99.2 ENCOUNTER REGARDING VASCULAR ACCESS FOR DIALYSIS FOR ESRD (HCC): Primary | ICD-10-CM

## 2021-12-20 PROBLEM — G43.009 ATYPICAL MIGRAINE: Status: ACTIVE | Noted: 2021-12-20

## 2021-12-20 PROBLEM — K83.4 SPASM OF SPHINCTER OF ODDI: Status: ACTIVE | Noted: 2021-12-20

## 2021-12-20 PROBLEM — R07.89 ATYPICAL CHEST PAIN: Status: ACTIVE | Noted: 2021-12-20

## 2021-12-20 PROBLEM — M54.9 BACK PAIN: Status: ACTIVE | Noted: 2021-12-20

## 2021-12-20 PROBLEM — M25.519 SHOULDER PAIN: Status: ACTIVE | Noted: 2021-12-20

## 2021-12-20 PROBLEM — I12.9 BENIGN HYPERTENSIVE RENAL DISEASE: Status: ACTIVE | Noted: 2021-09-21

## 2021-12-20 PROBLEM — I89.0 LYMPHEDEMA: Status: ACTIVE | Noted: 2021-09-21

## 2021-12-20 PROBLEM — E66.9 OBESITY: Status: ACTIVE | Noted: 2021-09-21

## 2021-12-20 PROBLEM — E78.2 MIXED HYPERLIPIDEMIA: Status: ACTIVE | Noted: 2021-12-20

## 2021-12-20 PROBLEM — M54.30 SCIATICA: Status: ACTIVE | Noted: 2021-12-20

## 2021-12-20 PROBLEM — H53.9 VISION DISORDER: Status: ACTIVE | Noted: 2021-12-20

## 2021-12-20 PROBLEM — I10 BENIGN ESSENTIAL HYPERTENSION: Status: ACTIVE | Noted: 2021-12-20

## 2021-12-20 PROBLEM — M10.9 GOUTY ARTHROPATHY: Status: ACTIVE | Noted: 2021-12-20

## 2021-12-20 PROBLEM — R31.9 HEMATURIA: Status: ACTIVE | Noted: 2021-12-20

## 2021-12-20 PROBLEM — R41.89 IMPAIRED COGNITION: Status: ACTIVE | Noted: 2021-12-20

## 2021-12-20 PROBLEM — E55.9 VITAMIN D DEFICIENCY: Status: ACTIVE | Noted: 2021-12-20

## 2021-12-20 PROCEDURE — 1036F TOBACCO NON-USER: CPT | Performed by: SURGERY

## 2021-12-20 PROCEDURE — G8484 FLU IMMUNIZE NO ADMIN: HCPCS | Performed by: SURGERY

## 2021-12-20 PROCEDURE — G8417 CALC BMI ABV UP PARAM F/U: HCPCS | Performed by: SURGERY

## 2021-12-20 PROCEDURE — G8427 DOCREV CUR MEDS BY ELIG CLIN: HCPCS | Performed by: SURGERY

## 2021-12-20 PROCEDURE — 99212 OFFICE O/P EST SF 10 MIN: CPT | Performed by: NURSE PRACTITIONER

## 2021-12-20 PROCEDURE — 3017F COLORECTAL CA SCREEN DOC REV: CPT | Performed by: SURGERY

## 2021-12-20 NOTE — PROGRESS NOTES
Vascular Surgery Outpatient Followup    PCP : Qi Adkins MD  Nephrologist : Dr. Hao Pierce : CKD Stage IV     2/25/21 L RC AVF (snuff)   4/29/21 Revision of left RC AVF- superficialization, branch ligation     HISTORY OF PRESENT ILLNESS:    The patient is a 63 y.o. male who is here in regards to follow up of their previosly placed left radiocephalic AVF. The patient is not on dialysis. His renal function is stable. He continues to have intermittent numbness of the left thumb and index finger. He also has left hand swelling. He denies arm swelling.      Past Medical History:        Diagnosis Date    A-V fistula ([address]     left arm    Arthritis     Back pain 2019    had LESI    CKD (chronic kidney disease)     stage 4    HTN (hypertension)     Keratoconus     bilateral    Kidney disease     Lymphadenopathy     lower legs    Migraine     Prolonged emergence from general anesthesia      Past Surgical History:        Procedure Laterality Date    COLONOSCOPY      CORNEAL TRANSPLANT  05/2001    right eye    DIALYSIS FISTULA CREATION Left 2/25/2021    CREATION AV FISTULA LEFT ARM performed by Cecil Bauman MD at [facility] Left 4/29/2021    REVISION ARTERIOVENOUS FISTULA LEFT ARM performed by Cecil Bauman MD at [address]  05/2002    lap tricia    UPPER GASTROINTESTINAL ENDOSCOPY N/A 4/7/2019    EGD FOREIGN BODY REMOVAL performed by Buck Hugo MD at [address]     Current Medications:   Current Outpatient Medications   Medication Sig Dispense Refill    allopurinol (ZYLOPRIM) 100 MG tablet Take 100 mg by mouth daily      propranolol (INDERAL) 40 MG tablet Take 40 mg by mouth every evening      hydroCHLOROthiazide (HYDRODIURIL) 25 MG tablet Take 25 mg by mouth every evening      colchicine (COLCRYS) 0.6 MG tablet Take 0.6 mg by mouth every evening       docusate sodium (COLACE) 100 MG capsule Take 100 mg by mouth every evening       dicyclomine (BENTYL) 20 MG tablet Take 40 mg by mouth 3 times daily       gabapentin (NEURONTIN) 300 MG capsule Take 300 mg by mouth 3 times daily as needed (for sciatica or back pain.). Instructed to take with sip water am of procedure, if needed.  amLODIPine (NORVASC) 10 MG tablet Take 10 mg by mouth every evening       benazepril (LOTENSIN) 40 MG tablet Take 40 mg by mouth daily       furosemide (LASIX) 20 MG tablet Take 20 mg by mouth every evening       topiramate (TOPAMAX) 25 MG tablet Take 25 mg by mouth every evening       SUMAtriptan (IMITREX) 25 MG tablet Take 25 mg by mouth 2 times daily as needed for Migraine      potassium chloride (KLOR-CON M) 20 MEQ extended release tablet Take 20 mEq by mouth every evening       Cholecalciferol (VITAMIN D3) 2000 units CAPS Take 1 capsule by mouth every evening       Coenzyme Q10 (COQ10) 100 MG CAPS Take 1 capsule by mouth every evening        No current facility-administered medications for this visit. Allergies:  Codeine and Morphine  Social History     Socioeconomic History    Marital status:      Spouse name: Not on file    Number of children: Not on file    Years of education: Not on file    Highest education level: Not on file   Occupational History    Not on file   Tobacco Use    Smoking status: Never Smoker    Smokeless tobacco: Never Used   Vaping Use    Vaping Use: Never used   Substance and Sexual Activity    Alcohol use: Never    Drug use: Never    Sexual activity: Not on file   Other Topics Concern    Not on file   Social History Narrative    Not on file     Social Determinants of Health     Financial Resource Strain:     Difficulty of Paying Living Expenses: Not on file   Food Insecurity:     Worried About [address] in the Last Year: Not on file    Dony of Food in the Last Year: Not on file   Transportation Needs:     Lack of Transportation (Medical):  Not on file    Lack of Transportation (Non-Medical): Not on file   Physical Activity:     Days of Exercise per Week: Not on file    Minutes of Exercise per Session: Not on file   Stress:     Feeling of Stress : Not on file   Social Connections:     Frequency of Communication with Friends and Family: Not on file    Frequency of Social Gatherings with Friends and Family: Not on file    Attends [employer] Services: Not on file    Active Member of [address] [place] [address] or Organizations: Not on file    Attends Club or Organization Meetings: Not on file    Marital Status: Not on file   Intimate Partner Violence:     Fear of Current or Ex-Partner: Not on file    Emotionally Abused: Not on file    Physically Abused: Not on file    Sexually Abused: Not on file   Housing Stability:     Unable to Pay for Housing in the Last Year: Not on file    Number of [place] in the Last Year: Not on file    Unstable Housing in the Last Year: Not on file     History reviewed. No pertinent family history.   Labs  Lab Results   Component Value Date    WBC 7.1 04/28/2021    HGB 13.5 04/28/2021    HCT 42.8 04/28/2021     04/28/2021    PROTIME 11.0 04/28/2021    INR 1.0 04/28/2021    K 3.9 04/28/2021    BUN 29 (H) 04/28/2021    CREATININE 2.2 (H) 04/28/2021     PHYSICAL EXAM:    CONSTITUTIONAL:   Awake, alert, cooperative  PSYCHIATRIC :  Oriented to time, place and person     Appropriate insight to disease process  EYES: Lids and lashes normal  ENT:  External ears and nose without lesions   Hearing deficits absent  NECK: Supple, symmetrical, trachea midline   Carotid bruit absent  LUNGS:  No increased work of breathing                 Clear to auscultation  CARDIOVASCULAR:  regular rate and rhythm   ABDOMEN:  soft, non-distended, non-tender   Hernias absent   Aorta is not palpable  SKIN:   Normal skin color   Texture and turgor normal, no induration  EXTREMITIES:   Left UE  · Thrill present  · Bruit present  · Edema present of hand, arm is soft without edema present   · Incisions well healed   Left brachial 2+   Left radial 2+   R LE Edema present  L LE Edema present    A/P Hx of previous Left upper extremity radiocephalic arteriovenous fistula  · pt's access is patent  · He is not yet on dialysis  · Ok to use for dialysis if needed   · they are not experiencing symptoms of steal syndrome  · Instructed pt to use compression sleeve for left hand swelling  · If no improvement in 1-2 months, can call for referral to lymphedema therapy   · Otherwise f/u in 6 months for followup  · Patient understands to call with any issues related to their access    Pt seen and plan reviewed with Dr. Felipa Augustin.      Vishal Parson, APRN - CNP

## 2022-06-17 ENCOUNTER — TELEPHONE (OUTPATIENT)
Dept: VASCULAR SURGERY | Age: 67
End: 2022-06-17

## 2022-06-20 ENCOUNTER — OFFICE VISIT (OUTPATIENT)
Dept: VASCULAR SURGERY | Age: 67
End: 2022-06-20
Payer: MEDICARE

## 2022-06-20 VITALS — BODY MASS INDEX: 41.91 KG/M2 | HEIGHT: 67 IN | WEIGHT: 267 LBS

## 2022-06-20 DIAGNOSIS — Z99.2 ENCOUNTER REGARDING VASCULAR ACCESS FOR DIALYSIS FOR ESRD (HCC): Primary | ICD-10-CM

## 2022-06-20 DIAGNOSIS — N18.6 ENCOUNTER REGARDING VASCULAR ACCESS FOR DIALYSIS FOR ESRD (HCC): Primary | ICD-10-CM

## 2022-06-20 PROCEDURE — 1036F TOBACCO NON-USER: CPT | Performed by: PHYSICIAN ASSISTANT

## 2022-06-20 PROCEDURE — 3017F COLORECTAL CA SCREEN DOC REV: CPT | Performed by: PHYSICIAN ASSISTANT

## 2022-06-20 PROCEDURE — 99212 OFFICE O/P EST SF 10 MIN: CPT | Performed by: PHYSICIAN ASSISTANT

## 2022-06-20 PROCEDURE — G8417 CALC BMI ABV UP PARAM F/U: HCPCS | Performed by: PHYSICIAN ASSISTANT

## 2022-06-20 PROCEDURE — G8427 DOCREV CUR MEDS BY ELIG CLIN: HCPCS | Performed by: PHYSICIAN ASSISTANT

## 2022-06-20 NOTE — PROGRESS NOTES
tablet Take 25 mg by mouth every evening      colchicine (COLCRYS) 0.6 MG tablet Take 0.6 mg by mouth every evening       docusate sodium (COLACE) 100 MG capsule Take 100 mg by mouth every evening       dicyclomine (BENTYL) 20 MG tablet Take 40 mg by mouth 3 times daily       gabapentin (NEURONTIN) 300 MG capsule Take 300 mg by mouth 3 times daily as needed (for sciatica or back pain.). Instructed to take with sip water am of procedure, if needed.  amLODIPine (NORVASC) 10 MG tablet Take 10 mg by mouth every evening       benazepril (LOTENSIN) 40 MG tablet Take 40 mg by mouth daily       furosemide (LASIX) 20 MG tablet Take 20 mg by mouth every evening       topiramate (TOPAMAX) 25 MG tablet Take 25 mg by mouth every evening       SUMAtriptan (IMITREX) 25 MG tablet Take 25 mg by mouth 2 times daily as needed for Migraine      potassium chloride (KLOR-CON M) 20 MEQ extended release tablet Take 20 mEq by mouth every evening       Cholecalciferol (VITAMIN D3) 2000 units CAPS Take 1 capsule by mouth every evening       Coenzyme Q10 (COQ10) 100 MG CAPS Take 1 capsule by mouth every evening        No current facility-administered medications for this visit.      Allergies:  Codeine and Morphine  Social History     Socioeconomic History    Marital status:      Spouse name: Not on file    Number of children: Not on file    Years of education: Not on file    Highest education level: Not on file   Occupational History    Not on file   Tobacco Use    Smoking status: Never Smoker    Smokeless tobacco: Never Used   Vaping Use    Vaping Use: Never used   Substance and Sexual Activity    Alcohol use: Never    Drug use: Never    Sexual activity: Not on file   Other Topics Concern    Not on file   Social History Narrative    Not on file     Social Determinants of Health     Financial Resource Strain:     Difficulty of Paying Living Expenses: Not on file   Food Insecurity:     Worried About Running Out of Food in the Last Year: Not on file    Ran Out of Food in the Last Year: Not on file   Transportation Needs:     Lack of Transportation (Medical): Not on file    Lack of Transportation (Non-Medical): Not on file   Physical Activity:     Days of Exercise per Week: Not on file    Minutes of Exercise per Session: Not on file   Stress:     Feeling of Stress : Not on file   Social Connections:     Frequency of Communication with Friends and Family: Not on file    Frequency of Social Gatherings with Friends and Family: Not on file    Attends Hindu Services: Not on file    Active Member of [address] [place] [address] [employer] or Organizations: Not on file    Attends Club or Organization Meetings: Not on file    Marital Status: Not on file   Intimate Partner Violence:     Fear of Current or Ex-Partner: Not on file    Emotionally Abused: Not on file    Physically Abused: Not on file    Sexually Abused: Not on file   Housing Stability:     Unable to Pay for Housing in the Last Year: Not on file    Number of [place] in the Last Year: Not on file    Unstable Housing in the Last Year: Not on file     History reviewed. No pertinent family history.   Labs  Lab Results   Component Value Date    WBC 7.1 04/28/2021    HGB 13.5 04/28/2021    HCT 42.8 04/28/2021     04/28/2021    PROTIME 11.0 04/28/2021    INR 1.0 04/28/2021    K 3.9 04/28/2021    BUN 29 (H) 04/28/2021    CREATININE 2.2 (H) 04/28/2021     PHYSICAL EXAM:    CONSTITUTIONAL:   Awake, alert, cooperative  PSYCHIATRIC :  Oriented to time, place and person     Appropriate insight to disease process  EYES: Lids and lashes normal  ENT:  External ears and nose without lesions   Hearing deficits absent  NECK: Supple, symmetrical, trachea midline   Carotid bruit absent  LUNGS:  No increased work of breathing                 Clear to auscultation  CARDIOVASCULAR:  regular rate and rhythm   ABDOMEN:  soft, non-distended, non-tender   Hernias absent   Aorta is not palpable  SKIN:   Normal skin color   Texture and turgor normal, no induration  EXTREMITIES:   Left UE  · Thrill present  · Bruit present  · AVF well dilated  · Mild edema present to hand, arm is soft without significant edema present   · Incisions well healed   Left brachial 2+   Left radial 2+   R LE Edema present  L LE Edema present    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 Mars Roberto.      Vielka Blanco PA-C

## 2023-03-12 ENCOUNTER — APPOINTMENT (OUTPATIENT)
Dept: GENERAL RADIOLOGY | Age: 68
DRG: 300 | End: 2023-03-12
Payer: MEDICARE

## 2023-03-12 ENCOUNTER — APPOINTMENT (OUTPATIENT)
Dept: CT IMAGING | Age: 68
DRG: 300 | End: 2023-03-12
Payer: MEDICARE

## 2023-03-12 ENCOUNTER — HOSPITAL ENCOUNTER (INPATIENT)
Age: 68
LOS: 3 days | Discharge: HOME | DRG: 300 | End: 2023-03-15
Attending: STUDENT IN AN ORGANIZED HEALTH CARE EDUCATION/TRAINING PROGRAM | Admitting: FAMILY MEDICINE
Payer: MEDICARE

## 2023-03-12 ENCOUNTER — APPOINTMENT (OUTPATIENT)
Dept: ULTRASOUND IMAGING | Age: 68
DRG: 300 | End: 2023-03-12
Payer: MEDICARE

## 2023-03-12 DIAGNOSIS — R26.2 UNABLE TO AMBULATE: ICD-10-CM

## 2023-03-12 DIAGNOSIS — N18.9 ACUTE ON CHRONIC RENAL INSUFFICIENCY: ICD-10-CM

## 2023-03-12 DIAGNOSIS — N28.9 ACUTE ON CHRONIC RENAL INSUFFICIENCY: ICD-10-CM

## 2023-03-12 DIAGNOSIS — I82.412 ACUTE DEEP VEIN THROMBOSIS (DVT) OF FEMORAL VEIN OF LEFT LOWER EXTREMITY (HCC): Primary | ICD-10-CM

## 2023-03-12 PROBLEM — I82.4Z2 DVT, LOWER EXTREMITY, DISTAL, ACUTE, LEFT (HCC): Status: ACTIVE | Noted: 2023-03-12

## 2023-03-12 LAB
ALBUMIN SERPL-MCNC: 4.4 G/DL (ref 3.5–5.2)
ALP BLD-CCNC: 73 U/L (ref 40–129)
ALT SERPL-CCNC: 24 U/L (ref 0–40)
ANION GAP SERPL CALCULATED.3IONS-SCNC: 14 MMOL/L (ref 7–16)
APTT: 29.3 SEC (ref 24.5–35.1)
AST SERPL-CCNC: 21 U/L (ref 0–39)
BACTERIA: NORMAL /HPF
BASOPHILS ABSOLUTE: 0.11 E9/L (ref 0–0.2)
BASOPHILS RELATIVE PERCENT: 1.1 % (ref 0–2)
BILIRUB SERPL-MCNC: 0.4 MG/DL (ref 0–1.2)
BILIRUBIN URINE: NEGATIVE
BLOOD, URINE: NEGATIVE
BUN BLDV-MCNC: 51 MG/DL (ref 6–23)
CALCIUM SERPL-MCNC: 9.4 MG/DL (ref 8.6–10.2)
CHLORIDE BLD-SCNC: 106 MMOL/L (ref 98–107)
CLARITY: CLEAR
CO2: 24 MMOL/L (ref 22–29)
COLOR: YELLOW
CREAT SERPL-MCNC: 2.8 MG/DL (ref 0.7–1.2)
EOSINOPHILS ABSOLUTE: 0.36 E9/L (ref 0.05–0.5)
EOSINOPHILS RELATIVE PERCENT: 3.6 % (ref 0–6)
EPITHELIAL CELLS, UA: NORMAL /HPF
GFR SERPL CREATININE-BSD FRML MDRD: 24 ML/MIN/1.73
GLUCOSE BLD-MCNC: 93 MG/DL (ref 74–99)
GLUCOSE URINE: NEGATIVE MG/DL
HCT VFR BLD CALC: 41.8 % (ref 37–54)
HCT VFR BLD CALC: 46.4 % (ref 37–54)
HEMOGLOBIN: 13.6 G/DL (ref 12.5–16.5)
HEMOGLOBIN: 14.8 G/DL (ref 12.5–16.5)
IMMATURE GRANULOCYTES #: 0.05 E9/L
IMMATURE GRANULOCYTES %: 0.5 % (ref 0–5)
INFLUENZA A: NOT DETECTED
INFLUENZA B: NOT DETECTED
KETONES, URINE: NEGATIVE MG/DL
LEUKOCYTE ESTERASE, URINE: NEGATIVE
LYMPHOCYTES ABSOLUTE: 1.17 E9/L (ref 1.5–4)
LYMPHOCYTES RELATIVE PERCENT: 11.6 % (ref 20–42)
MCH RBC QN AUTO: 28.3 PG (ref 26–35)
MCH RBC QN AUTO: 28.9 PG (ref 26–35)
MCHC RBC AUTO-ENTMCNC: 31.9 % (ref 32–34.5)
MCHC RBC AUTO-ENTMCNC: 32.5 % (ref 32–34.5)
MCV RBC AUTO: 88.7 FL (ref 80–99.9)
MCV RBC AUTO: 88.9 FL (ref 80–99.9)
MONOCYTES ABSOLUTE: 0.71 E9/L (ref 0.1–0.95)
MONOCYTES RELATIVE PERCENT: 7.1 % (ref 2–12)
NEUTROPHILS ABSOLUTE: 7.67 E9/L (ref 1.8–7.3)
NEUTROPHILS RELATIVE PERCENT: 76.1 % (ref 43–80)
NITRITE, URINE: NEGATIVE
PDW BLD-RTO: 14.7 FL (ref 11.5–15)
PDW BLD-RTO: 14.9 FL (ref 11.5–15)
PH UA: 6 (ref 5–9)
PLATELET # BLD: 152 E9/L (ref 130–450)
PLATELET # BLD: 180 E9/L (ref 130–450)
PMV BLD AUTO: 10.9 FL (ref 7–12)
PMV BLD AUTO: 11.3 FL (ref 7–12)
POTASSIUM SERPL-SCNC: 4.2 MMOL/L (ref 3.5–5)
PROTEIN UA: NEGATIVE MG/DL
RBC # BLD: 4.7 E12/L (ref 3.8–5.8)
RBC # BLD: 5.23 E12/L (ref 3.8–5.8)
RBC UA: NORMAL /HPF (ref 0–2)
SARS-COV-2 RNA, RT PCR: NOT DETECTED
SODIUM BLD-SCNC: 144 MMOL/L (ref 132–146)
SPECIFIC GRAVITY UA: 1.01 (ref 1–1.03)
TOTAL PROTEIN: 7.5 G/DL (ref 6.4–8.3)
TROPONIN, HIGH SENSITIVITY: 30 NG/L (ref 0–11)
TROPONIN, HIGH SENSITIVITY: 31 NG/L (ref 0–11)
UROBILINOGEN, URINE: 0.2 E.U./DL
WBC # BLD: 10.1 E9/L (ref 4.5–11.5)
WBC # BLD: 9.4 E9/L (ref 4.5–11.5)
WBC UA: NORMAL /HPF (ref 0–5)

## 2023-03-12 PROCEDURE — 96374 THER/PROPH/DIAG INJ IV PUSH: CPT

## 2023-03-12 PROCEDURE — 87088 URINE BACTERIA CULTURE: CPT

## 2023-03-12 PROCEDURE — 1200000000 HC SEMI PRIVATE

## 2023-03-12 PROCEDURE — 72170 X-RAY EXAM OF PELVIS: CPT

## 2023-03-12 PROCEDURE — 93005 ELECTROCARDIOGRAM TRACING: CPT | Performed by: STUDENT IN AN ORGANIZED HEALTH CARE EDUCATION/TRAINING PROGRAM

## 2023-03-12 PROCEDURE — 36415 COLL VENOUS BLD VENIPUNCTURE: CPT

## 2023-03-12 PROCEDURE — 96372 THER/PROPH/DIAG INJ SC/IM: CPT

## 2023-03-12 PROCEDURE — 96375 TX/PRO/DX INJ NEW DRUG ADDON: CPT

## 2023-03-12 PROCEDURE — 81001 URINALYSIS AUTO W/SCOPE: CPT

## 2023-03-12 PROCEDURE — 85025 COMPLETE CBC W/AUTO DIFF WBC: CPT

## 2023-03-12 PROCEDURE — 87636 SARSCOV2 & INF A&B AMP PRB: CPT

## 2023-03-12 PROCEDURE — 85027 COMPLETE CBC AUTOMATED: CPT

## 2023-03-12 PROCEDURE — 6360000002 HC RX W HCPCS: Performed by: STUDENT IN AN ORGANIZED HEALTH CARE EDUCATION/TRAINING PROGRAM

## 2023-03-12 PROCEDURE — 84484 ASSAY OF TROPONIN QUANT: CPT

## 2023-03-12 PROCEDURE — 80053 COMPREHEN METABOLIC PANEL: CPT

## 2023-03-12 PROCEDURE — 72131 CT LUMBAR SPINE W/O DYE: CPT

## 2023-03-12 PROCEDURE — 93970 EXTREMITY STUDY: CPT

## 2023-03-12 PROCEDURE — 99285 EMERGENCY DEPT VISIT HI MDM: CPT

## 2023-03-12 PROCEDURE — 93971 EXTREMITY STUDY: CPT | Performed by: RADIOLOGY

## 2023-03-12 PROCEDURE — 85730 THROMBOPLASTIN TIME PARTIAL: CPT

## 2023-03-12 PROCEDURE — 71045 X-RAY EXAM CHEST 1 VIEW: CPT

## 2023-03-12 RX ORDER — ALLOPURINOL 100 MG/1
100 TABLET ORAL DAILY
Status: DISCONTINUED | OUTPATIENT
Start: 2023-03-12 | End: 2023-03-15 | Stop reason: HOSPADM

## 2023-03-12 RX ORDER — DOCUSATE SODIUM 100 MG/1
100 CAPSULE, LIQUID FILLED ORAL EVERY EVENING
Status: DISCONTINUED | OUTPATIENT
Start: 2023-03-12 | End: 2023-03-15 | Stop reason: HOSPADM

## 2023-03-12 RX ORDER — LISINOPRIL 10 MG/1
40 TABLET ORAL DAILY
Status: DISCONTINUED | OUTPATIENT
Start: 2023-03-12 | End: 2023-03-15 | Stop reason: HOSPADM

## 2023-03-12 RX ORDER — TOPIRAMATE 25 MG/1
25 TABLET ORAL EVERY EVENING
Status: DISCONTINUED | OUTPATIENT
Start: 2023-03-12 | End: 2023-03-13 | Stop reason: CLARIF

## 2023-03-12 RX ORDER — ORPHENADRINE CITRATE 30 MG/ML
60 INJECTION INTRAMUSCULAR; INTRAVENOUS ONCE
Status: COMPLETED | OUTPATIENT
Start: 2023-03-12 | End: 2023-03-12

## 2023-03-12 RX ORDER — KETOROLAC TROMETHAMINE 30 MG/ML
15 INJECTION, SOLUTION INTRAMUSCULAR; INTRAVENOUS ONCE
Status: DISCONTINUED | OUTPATIENT
Start: 2023-03-12 | End: 2023-03-12

## 2023-03-12 RX ORDER — HYDROCHLOROTHIAZIDE 25 MG/1
25 TABLET ORAL EVERY EVENING
Status: DISCONTINUED | OUTPATIENT
Start: 2023-03-12 | End: 2023-03-13 | Stop reason: CLARIF

## 2023-03-12 RX ORDER — SODIUM CHLORIDE 0.9 % (FLUSH) 0.9 %
10 SYRINGE (ML) INJECTION PRN
Status: DISCONTINUED | OUTPATIENT
Start: 2023-03-12 | End: 2023-03-15 | Stop reason: HOSPADM

## 2023-03-12 RX ORDER — HEPARIN SODIUM 1000 [USP'U]/ML
40 INJECTION, SOLUTION INTRAVENOUS; SUBCUTANEOUS PRN
Status: DISCONTINUED | OUTPATIENT
Start: 2023-03-12 | End: 2023-03-15 | Stop reason: HOSPADM

## 2023-03-12 RX ORDER — ACETAMINOPHEN 650 MG/1
650 SUPPOSITORY RECTAL EVERY 6 HOURS PRN
Status: DISCONTINUED | OUTPATIENT
Start: 2023-03-12 | End: 2023-03-15 | Stop reason: HOSPADM

## 2023-03-12 RX ORDER — ONDANSETRON 2 MG/ML
4 INJECTION INTRAMUSCULAR; INTRAVENOUS EVERY 6 HOURS PRN
Status: DISCONTINUED | OUTPATIENT
Start: 2023-03-12 | End: 2023-03-15 | Stop reason: HOSPADM

## 2023-03-12 RX ORDER — DEXAMETHASONE SODIUM PHOSPHATE 4 MG/ML
10 INJECTION, SOLUTION INTRA-ARTICULAR; INTRALESIONAL; INTRAMUSCULAR; INTRAVENOUS; SOFT TISSUE ONCE
Status: COMPLETED | OUTPATIENT
Start: 2023-03-12 | End: 2023-03-12

## 2023-03-12 RX ORDER — SODIUM CHLORIDE 0.9 % (FLUSH) 0.9 %
10 SYRINGE (ML) INJECTION EVERY 12 HOURS SCHEDULED
Status: DISCONTINUED | OUTPATIENT
Start: 2023-03-12 | End: 2023-03-15 | Stop reason: HOSPADM

## 2023-03-12 RX ORDER — POLYETHYLENE GLYCOL 3350 17 G/17G
17 POWDER, FOR SOLUTION ORAL DAILY PRN
Status: DISCONTINUED | OUTPATIENT
Start: 2023-03-12 | End: 2023-03-15 | Stop reason: HOSPADM

## 2023-03-12 RX ORDER — DICYCLOMINE HYDROCHLORIDE 10 MG/1
40 CAPSULE ORAL 3 TIMES DAILY
Status: DISCONTINUED | OUTPATIENT
Start: 2023-03-12 | End: 2023-03-15 | Stop reason: HOSPADM

## 2023-03-12 RX ORDER — SODIUM CHLORIDE 9 MG/ML
INJECTION, SOLUTION INTRAVENOUS PRN
Status: DISCONTINUED | OUTPATIENT
Start: 2023-03-12 | End: 2023-03-15 | Stop reason: HOSPADM

## 2023-03-12 RX ORDER — COLCHICINE 0.6 MG/1
0.6 TABLET ORAL EVERY EVENING
Status: DISCONTINUED | OUTPATIENT
Start: 2023-03-12 | End: 2023-03-13 | Stop reason: CLARIF

## 2023-03-12 RX ORDER — PROMETHAZINE HYDROCHLORIDE 12.5 MG/1
12.5 TABLET ORAL EVERY 6 HOURS PRN
Status: DISCONTINUED | OUTPATIENT
Start: 2023-03-12 | End: 2023-03-15 | Stop reason: HOSPADM

## 2023-03-12 RX ORDER — GABAPENTIN 300 MG/1
300 CAPSULE ORAL 3 TIMES DAILY PRN
Status: DISCONTINUED | OUTPATIENT
Start: 2023-03-13 | End: 2023-03-15 | Stop reason: HOSPADM

## 2023-03-12 RX ORDER — HEPARIN SODIUM 1000 [USP'U]/ML
80 INJECTION, SOLUTION INTRAVENOUS; SUBCUTANEOUS ONCE
Status: COMPLETED | OUTPATIENT
Start: 2023-03-12 | End: 2023-03-12

## 2023-03-12 RX ORDER — AMLODIPINE BESYLATE 10 MG/1
10 TABLET ORAL EVERY EVENING
Status: DISCONTINUED | OUTPATIENT
Start: 2023-03-12 | End: 2023-03-15 | Stop reason: HOSPADM

## 2023-03-12 RX ORDER — PROPRANOLOL HYDROCHLORIDE 20 MG/1
40 TABLET ORAL EVERY EVENING
Status: DISCONTINUED | OUTPATIENT
Start: 2023-03-12 | End: 2023-03-13 | Stop reason: CLARIF

## 2023-03-12 RX ORDER — POTASSIUM CHLORIDE 20 MEQ/1
20 TABLET, EXTENDED RELEASE ORAL EVERY EVENING
Status: DISCONTINUED | OUTPATIENT
Start: 2023-03-12 | End: 2023-03-15 | Stop reason: HOSPADM

## 2023-03-12 RX ORDER — HEPARIN SODIUM 1000 [USP'U]/ML
80 INJECTION, SOLUTION INTRAVENOUS; SUBCUTANEOUS PRN
Status: DISCONTINUED | OUTPATIENT
Start: 2023-03-12 | End: 2023-03-15 | Stop reason: HOSPADM

## 2023-03-12 RX ORDER — ACETAMINOPHEN 325 MG/1
650 TABLET ORAL EVERY 6 HOURS PRN
Status: DISCONTINUED | OUTPATIENT
Start: 2023-03-12 | End: 2023-03-15 | Stop reason: HOSPADM

## 2023-03-12 RX ORDER — CHOLECALCIFEROL (VITAMIN D3) 50 MCG
2000 TABLET ORAL EVERY EVENING
Status: DISCONTINUED | OUTPATIENT
Start: 2023-03-12 | End: 2023-03-15 | Stop reason: HOSPADM

## 2023-03-12 RX ORDER — HEPARIN SODIUM 10000 [USP'U]/100ML
5-30 INJECTION, SOLUTION INTRAVENOUS CONTINUOUS
Status: DISCONTINUED | OUTPATIENT
Start: 2023-03-12 | End: 2023-03-15 | Stop reason: HOSPADM

## 2023-03-12 RX ADMIN — DEXAMETHASONE SODIUM PHOSPHATE 10 MG: 4 INJECTION, SOLUTION INTRAMUSCULAR; INTRAVENOUS at 18:09

## 2023-03-12 RX ADMIN — ORPHENADRINE CITRATE 60 MG: 30 INJECTION INTRAMUSCULAR; INTRAVENOUS at 18:10

## 2023-03-12 RX ADMIN — HEPARIN SODIUM 17 UNITS/KG/HR: 10000 INJECTION, SOLUTION INTRAVENOUS at 19:59

## 2023-03-12 RX ADMIN — HEPARIN SODIUM 9760 UNITS: 1000 INJECTION INTRAVENOUS; SUBCUTANEOUS at 19:57

## 2023-03-12 ASSESSMENT — PAIN DESCRIPTION - ORIENTATION: ORIENTATION: RIGHT;LEFT;LOWER

## 2023-03-12 ASSESSMENT — PAIN - FUNCTIONAL ASSESSMENT: PAIN_FUNCTIONAL_ASSESSMENT: NONE - DENIES PAIN

## 2023-03-12 ASSESSMENT — PAIN SCALES - GENERAL: PAINLEVEL_OUTOF10: 9

## 2023-03-12 ASSESSMENT — PAIN DESCRIPTION - LOCATION: LOCATION: LEG

## 2023-03-12 NOTE — ED NOTES
Department of Emergency Medicine  FIRST PROVIDER TRIAGE NOTE             Independent MLP           3/12/23  2:48 PM EDT    Date of Encounter: 3/12/23   MRN: [id number]      HPI: Herman Roque is a 74 y.o. male who presents to the ED for No chief complaint on file. Pt presenting with b/l leg weakness     ROS: Negative for cp or sob. PE: Gen Appearance/Constitutional: alert  HEENT: NC/NT. PERRLA,  Airway patent. Initial Plan of Care: All treatment areas with department are currently occupied.  Plan to order/Initiate the following while awaiting opening in ED:  Initiate Treatment-Testing, Proceed toTreatment Area When Bed Available for ED Attending/MLP to Continue Care    Electronically signed by Camila Sidhu PA-C   DD: 3/12/23      Camila Sidhu PA-C  03/12/23 [id number]

## 2023-03-12 NOTE — ED PROVIDER NOTES
[address] [facility]        Pt Name: Dianne Beasley  MRN: [id number]  [place] 1955  Date of evaluation: 3/12/2023  Provider: Ted Keith DO  PCP: Luna Corado MD  Note Started: 3:09 PM EDT 3/12/23    CHIEF COMPLAINT       Chief Complaint   Patient presents with    Extremity Weakness     Pt states bilateral leg weakness that has gotten progressively worse since Wednesday. Pt states he is having trouble ambulating. Denies loss of bowel or bladder. Fall     Pt states he fell this AM -thinners        HISTORY OF PRESENT ILLNESS: 1 or more Elements   History From: Patient    Limitations to history : None    Dianne Beasley is a 74 y.o. male with past medical history of hypertension, CKD, hyperlipidemia and obesity who presents to the emergency department due to bilateral extremity weakness and fall. Patient states that his symptoms have been ongoing since Wednesday. Patient denies any inciting factors for symptoms. He endorses gradual onset with progressive worsening. Patient states that on Wednesday his left leg was hurting him a lot and he was starting to have some difficulty ambulating. The next day, his left leg started to bother him as well. Patient states that both legs have been more painful over the past few days and now he is essentially unable to ambulate. He states that his pain feels like a sciatic pain, especially on the left. Patient noting that when he tried to get in the car to come to the ED today he did fall. Patient denies any head trauma with the fall. He is not on any blood thinners. He denies any trauma or injury to the legs where his pain is present. Patient denies any other associated symptoms including nausea, vomiting, fever, chills, headache, lightheadedness, dizziness, syncope, chest pain, shortness of breath, abdominal pain, back pain, neck pain, urinary symptoms, constipation or diarrhea.   Patient denying any recent sick contacts or illnesses. On initial assessment he is well-appearing in no acute distress. Nursing Notes were all reviewed and agreed with or any disagreements were addressed in the HPI.    ROS:   Pertinent positives and negatives are stated within HPI, all other systems reviewed and are negative.    --------------------------------------------- PAST HISTORY ---------------------------------------------  Past Medical History:  has a past medical history of A-V fistula ([facility] [address] Arthritis, Back pain, CKD (chronic kidney disease), HTN (hypertension), Keratoconus, Kidney disease, Lymphadenopathy, Migraine, and Prolonged emergence from general anesthesia. Past Surgical History:  has a past surgical history that includes Upper gastrointestinal endoscopy (N/A, 4/7/2019); Corneal transplant (05/2001); Gallbladder surgery (05/2002); Colonoscopy; Dialysis fistula creation (Left, 2/25/2021); and Dialysis fistula creation (Left, 4/29/2021). Social History:  reports that he has never smoked. He has never used smokeless tobacco. He reports that he does not drink alcohol and does not use drugs. Family History: family history is not on file. The patients home medications have been reviewed. Allergies: Codeine and Morphine    ---------------------------------------------------PHYSICAL EXAM--------------------------------------    Constitutional/General: Alert and oriented x3, chronically ill appearing, non toxic in NAD, obese  Head: Normocephalic and atraumatic  Mouth: Oropharynx clear, handling secretions, no trismus  Neck: Supple, full ROM,  Pulmonary: Lungs clear to auscultation bilaterally, no wheezes, rales, or rhonchi. Not in respiratory distress  Cardiovascular:  Regular rate. Regular rhythm. No murmurs  Chest: no chest wall tenderness  Abdomen: Soft. Non tender. Non distended. No rebound, guarding, or rigidity. No pulsatile masses appreciated. Musculoskeletal: Moves all extremities x 4.  Warm and well perfused, no clubbing, cyanosis, or edema. Capillary refill <3 seconds. Bilateral lower extremity swelling, chronic and worsened by body habitus. Bilateral lower extremities are tender to palpation but no obvious step-off or deformities were palpated. Compartments of the lower extremities were soft and compressible. Bilateral lower extremities neurovascular intact with good DP/TP pulses. No sensory deficits noted. Skin: warm and dry. No rashes. No overlying skin changes to the lower extremities that would suggest cellulitis including erythema, warmth or lymphatic streaking. Neurologic: GCS 15, no gross focal neurologic deficits  Psych: Normal Affect    -------------------------------------------------- RESULTS -------------------------------------------------  I have personally reviewed all laboratory and imaging results for this patient. Results are listed below.      LABS:  Results for orders placed or performed during the hospital encounter of 03/12/23   COVID-19 & Influenza Combo    Specimen: Nasopharyngeal Swab   Result Value Ref Range    SARS-CoV-2 RNA, RT PCR NOT DETECTED NOT DETECTED    INFLUENZA A NOT DETECTED NOT DETECTED    INFLUENZA B NOT DETECTED NOT DETECTED   CBC with Auto Differential   Result Value Ref Range    WBC 10.1 4.5 - 11.5 E9/L    RBC 5.23 3.80 - 5.80 E12/L    Hemoglobin 14.8 12.5 - 16.5 g/dL    Hematocrit 46.4 37.0 - 54.0 %    MCV 88.7 80.0 - 99.9 fL    MCH 28.3 26.0 - 35.0 pg    MCHC 31.9 (L) 32.0 - 34.5 %    RDW 14.9 11.5 - 15.0 fL    Platelets 825 880 - 281 E9/L    MPV 10.9 7.0 - 12.0 fL    Neutrophils % 76.1 43.0 - 80.0 %    Immature Granulocytes % 0.5 0.0 - 5.0 %    Lymphocytes % 11.6 (L) 20.0 - 42.0 %    Monocytes % 7.1 2.0 - 12.0 %    Eosinophils % 3.6 0.0 - 6.0 %    Basophils % 1.1 0.0 - 2.0 %    Neutrophils Absolute 7.67 (H) 1.80 - 7.30 E9/L    Immature Granulocytes # 0.05 E9/L    Lymphocytes Absolute 1.17 (L) 1.50 - 4.00 E9/L    Monocytes Absolute 0.71 0.10 - 0.95 E9/L Eosinophils Absolute 0.36 0.05 - 0.50 E9/L    Basophils Absolute 0.11 0.00 - 0.20 E9/L   CMP   Result Value Ref Range    Sodium 144 132 - 146 mmol/L    Potassium 4.2 3.5 - 5.0 mmol/L    Chloride 106 98 - 107 mmol/L    CO2 24 22 - 29 mmol/L    Anion Gap 14 7 - 16 mmol/L    Glucose 93 74 - 99 mg/dL    BUN 51 (H) 6 - 23 mg/dL    Creatinine 2.8 (H) 0.7 - 1.2 mg/dL    Est, Glom Filt Rate 24 >=60 mL/min/1.73    Calcium 9.4 8.6 - 10.2 mg/dL    Total Protein 7.5 6.4 - 8.3 g/dL    Albumin 4.4 3.5 - 5.2 g/dL    Total Bilirubin 0.4 0.0 - 1.2 mg/dL    Alkaline Phosphatase 73 40 - 129 U/L    ALT 24 0 - 40 U/L    AST 21 0 - 39 U/L   Troponin   Result Value Ref Range    Troponin, High Sensitivity 31 (H) 0 - 11 ng/L   Urinalysis with Microscopic   Result Value Ref Range    Color, UA Yellow Straw/Yellow    Clarity, UA Clear Clear    Glucose, Ur Negative Negative mg/dL    Bilirubin Urine Negative Negative    Ketones, Urine Negative Negative mg/dL    Specific Gravity, UA 1.015 1.005 - 1.030    Blood, Urine Negative Negative    pH, UA 6.0 5.0 - 9.0    Protein, UA Negative Negative mg/dL    Urobilinogen, Urine 0.2 <2.0 E.U./dL    Nitrite, Urine Negative Negative    Leukocyte Esterase, Urine Negative Negative    WBC, UA 0-1 0 - 5 /HPF    RBC, UA NONE 0 - 2 /HPF    Epithelial Cells, UA FEW /HPF    Bacteria, UA NONE SEEN None Seen /HPF   Troponin   Result Value Ref Range    Troponin, High Sensitivity 30 (H) 0 - 11 ng/L   CBC   Result Value Ref Range    WBC 9.4 4.5 - 11.5 E9/L    RBC 4.70 3.80 - 5.80 E12/L    Hemoglobin 13.6 12.5 - 16.5 g/dL    Hematocrit 41.8 37.0 - 54.0 %    MCV 88.9 80.0 - 99.9 fL    MCH 28.9 26.0 - 35.0 pg    MCHC 32.5 32.0 - 34.5 %    RDW 14.7 11.5 - 15.0 fL    Platelets 748 786 - 688 E9/L    MPV 11.3 7.0 - 12.0 fL   APTT   Result Value Ref Range    aPTT 29.3 24.5 - 35.1 sec   Comprehensive Metabolic Panel w/ Reflex to MG   Result Value Ref Range    Sodium 142 132 - 146 mmol/L    Potassium reflex Magnesium 3.7 3.5 - 5.0 mmol/L    Chloride 105 98 - 107 mmol/L    CO2 22 22 - 29 mmol/L    Anion Gap 15 7 - 16 mmol/L    Glucose 128 (H) 74 - 99 mg/dL    BUN 48 (H) 6 - 23 mg/dL    Creatinine 2.5 (H) 0.7 - 1.2 mg/dL    EstNikolas Filt Rate 28 >=60 mL/min/1.73    Calcium 9.2 8.6 - 10.2 mg/dL    Total Protein 7.0 6.4 - 8.3 g/dL    Albumin 4.1 3.5 - 5.2 g/dL    Total Bilirubin 0.5 0.0 - 1.2 mg/dL    Alkaline Phosphatase 70 40 - 129 U/L    ALT 21 0 - 40 U/L    AST 17 0 - 39 U/L   CBC with Auto Differential   Result Value Ref Range    WBC 7.8 4.5 - 11.5 E9/L    RBC 4.87 3.80 - 5.80 E12/L    Hemoglobin 14.1 12.5 - 16.5 g/dL    Hematocrit 42.9 37.0 - 54.0 %    MCV 88.1 80.0 - 99.9 fL    MCH 29.0 26.0 - 35.0 pg    MCHC 32.9 32.0 - 34.5 %    RDW 14.7 11.5 - 15.0 fL    Platelets 214 153 - 571 E9/L    MPV 11.7 7.0 - 12.0 fL    Neutrophils % 86.4 (H) 43.0 - 80.0 %    Immature Granulocytes % 0.6 0.0 - 5.0 %    Lymphocytes % 10.8 (L) 20.0 - 42.0 %    Monocytes % 1.7 (L) 2.0 - 12.0 %    Eosinophils % 0.0 0.0 - 6.0 %    Basophils % 0.5 0.0 - 2.0 %    Neutrophils Absolute 6.77 1.80 - 7.30 E9/L    Immature Granulocytes # 0.05 E9/L    Lymphocytes Absolute 0.85 (L) 1.50 - 4.00 E9/L    Monocytes Absolute 0.13 0.10 - 0.95 E9/L    Eosinophils Absolute 0.00 (L) 0.05 - 0.50 E9/L    Basophils Absolute 0.04 0.00 - 0.20 E9/L   SPECIMEN REJECTION   Result Value Ref Range    Rejected Test PTT     Reason for Rejection see below    APTT   Result Value Ref Range    aPTT >240.0 (H) 24.5 - 35.1 sec   CBC with Auto Differential   Result Value Ref Range    WBC 8.9 4.5 - 11.5 E9/L    RBC 4.93 3.80 - 5.80 E12/L    Hemoglobin 14.2 12.5 - 16.5 g/dL    Hematocrit 44.2 37.0 - 54.0 %    MCV 89.7 80.0 - 99.9 fL    MCH 28.8 26.0 - 35.0 pg    MCHC 32.1 32.0 - 34.5 %    RDW 14.8 11.5 - 15.0 fL    Platelets 513 164 - 338 E9/L    MPV 11.4 7.0 - 12.0 fL    Neutrophils % 88.2 (H) 43.0 - 80.0 %    Immature Granulocytes % 0.8 0.0 - 5.0 %    Lymphocytes % 8.1 (L) 20.0 - 42.0 %    Monocytes % 2.2 2.0 - 12.0 %    Eosinophils % 0.0 0.0 - 6.0 %    Basophils % 0.7 0.0 - 2.0 %    Neutrophils Absolute 7.84 (H) 1.80 - 7.30 E9/L    Immature Granulocytes # 0.07 E9/L    Lymphocytes Absolute 0.72 (L) 1.50 - 4.00 E9/L    Monocytes Absolute 0.20 0.10 - 0.95 E9/L    Eosinophils Absolute 0.00 (L) 0.05 - 0.50 E9/L    Basophils Absolute 0.06 0.00 - 0.20 E9/L   APTT   Result Value Ref Range    aPTT 30.9 24.5 - 35.1 sec   EKG 12 Lead   Result Value Ref Range    Ventricular Rate 75 BPM    Atrial Rate 75 BPM    P-R Interval 168 ms    QRS Duration 94 ms    Q-T Interval 400 ms    QTc Calculation (Bazett) 446 ms    P Axis 62 degrees    R Axis 42 degrees    T Axis 30 degrees   TYPE AND SCREEN   Result Value Ref Range    ABO/Rh A POS     Antibody Screen NEG        RADIOLOGY:   Interpretation per the Radiologist below, if available at the time of this note:    XR PELVIS (1-2 VIEWS)   Final Result   No acute osseous findings. XR CHEST PORTABLE   Final Result   Right hemidiaphragm eventration and right lung volume loss. Hyperinflated left lung. Mild cardiomegaly. CT LUMBAR SPINE WO CONTRAST   Final Result   1. No fracture or subluxation. 2.  Osteo-degenerative changes and discogenic disc disease, as described   above. 3.  Small posterior ridge-disc complexes at L4-5 and L5-S1.      4.  Multilevel spinal canal stenosis, with significant stenosis seen at   T12-L1 and L3-4.         US DUP LOWER EXTREMITIES BILATERAL VENOUS   Final Result   There is evidence for deep venous thrombosis      ALERT:  THIS IS AN ABNORMAL REPORT                 No results found. No results found.    ------------------------- NURSING NOTES AND VITALS REVIEWED ---------------------------   The nursing notes within the ED encounter and vital signs as below have been reviewed by myself.   /85   Pulse 87   Temp 97.6 °F (36.4 °C) (Temporal)   Resp 18   Ht 5' 7\" (1.702 m)   Wt 269 lb (122 kg)   SpO2 96%   BMI 42.13 kg/m²   Oxygen Saturation Interpretation: Normal    The patients available past medical records and past encounters were reviewed.         ------------------------------ ED COURSE/MEDICAL DECISION MAKING----------------------  Medications   heparin (porcine) injection 9,760 Units (has no administration in time range)   heparin (porcine) injection 4,880 Units (has no administration in time range)   heparin 25,000 units in dextrose 5% 250 mL (premix) infusion ( IntraVENous Held by provider 3/13/23 0518)   allopurinol (ZYLOPRIM) tablet 100 mg (100 mg Oral Given 3/13/23 0953)   amLODIPine (NORVASC) tablet 10 mg (10 mg Oral Not Given 3/12/23 2100)   lisinopril (PRINIVIL;ZESTRIL) tablet 40 mg (40 mg Oral Given 3/13/23 0953)   vitamin D (CHOLECALCIFEROL) tablet 2,000 Units (has no administration in time range)   dicyclomine (BENTYL) capsule 40 mg (40 mg Oral Given 3/13/23 0953)   docusate sodium (COLACE) capsule 100 mg (100 mg Oral Not Given 3/12/23 2100)   gabapentin (NEURONTIN) capsule 300 mg (has no administration in time range)   potassium chloride (KLOR-CON M) extended release tablet 20 mEq (20 mEq Oral Not Given 3/13/23 0402)   sodium chloride flush 0.9 % injection 10 mL (10 mLs IntraVENous Given 3/13/23 0956)   sodium chloride flush 0.9 % injection 10 mL (has no administration in time range)   0.9 % sodium chloride infusion (has no administration in time range)   promethazine (PHENERGAN) tablet 12.5 mg (has no administration in time range)     Or   ondansetron (ZOFRAN) injection 4 mg (has no administration in time range)   polyethylene glycol (GLYCOLAX) packet 17 g (has no administration in time range)   acetaminophen (TYLENOL) tablet 650 mg (has no administration in time range)     Or   acetaminophen (TYLENOL) suppository 650 mg (has no administration in time range)   colchicine (COLCRYS) tablet 0.6 mg (has no administration in time range)   hydroCHLOROthiazide (HYDRODIURIL) tablet 25 mg (has no administration in time range)   propranolol (INDERAL) tablet 40 mg (has no administration in time range)   topiramate (TOPAMAX) tablet 25 mg (has no administration in time range)   dexamethasone (DECADRON) injection 10 mg (10 mg IntraVENous Given 3/12/23 1809)   orphenadrine (NORFLEX) injection 60 mg (60 mg IntraMUSCular Given 3/12/23 1810)   heparin (porcine) injection 9,760 Units (9,760 Units IntraVENous Given 3/1955)       Medical Decision Making/Differential Diagnosis:    CC/HPI Summary, Pertinent Physical Exam Findings, Social Determinants of health, Records Reviewed, DDx, testing done/not done, ED Course, Reassessment, disposition considerations/shared decision making with patient, consults, disposition:        Medical Decision Making:   I, Dr. Ginna Aguilar am the resident physician of record. History From: Patient    Limitations to history : None     Peace Silva is a 74 y.o. male who presents to the ED for bilateral leg pain and inability to ambulate. Vital signs upon arrival /85   Pulse 87   Temp 97.6 °F (36.4 °C) (Temporal)   Resp 18   Ht 5' 7\" (1.702 m)   Wt 269 lb (122 kg)   SpO2 96%   BMI 42.13 kg/m²     On initial evaluation, patient was nontoxic-appearing, afebrile, hemodynamically stable and in no acute distress. Working diagnoses include but not limited to CHF, cellulitis, DVT, dependent edema, lymphedema, venous stasis dermatitis or traumatic injury. Physical exam was remarkable for swelling to the bilateral lower extremities with tenderness to palpation. Lower extremities were otherwise neurovascularly intact and compartments are soft and compressible. Range of motion fairly normal at the hips, knees and ankles. No other concerning physical exam findings. Lungs were clear to auscultation bilaterally. Abdomen was soft, nontender and nondistended.   Work-up in the emergency department as interpreted by me significant for acute DVT in the left superficial femoral vein on lower extremity duplex studies. Remaining work-up as interpreted by me include CBC with no leukocytosis, left shift or significant anemia. WBC 9.4 with hemoglobin stable at 13.6. Platelet count normal at 152. CMP notable for acute on chronic renal insufficiency  with creatinine 2.8/BUN 51. Baseline serum creatinine 2.3. No major electrolyte abnormalities including normal potassium of 4.2 and sodium of 144. LFTs within normal limits. Viral testing negative for COVID and influenza. Urinalysis does not suggest acute infection. Cardiac evaluation generally unremarkable. Initial troponin of 31 with a repeat of 30, delta 1. Patient was denying any active chest pain while in the emergency department. EKG was sinus and nonspecific with no gross acute ischemic changes seen. Chest x-ray was clear. Pelvis x-ray and CT lumbar spine were unremarkable for acute osseous abnormalities. Imaging studies as interpreted by me detailed below with official radiology read above. Attempt to ambulate the patient in the ED were unremarkable. He did require a lot of assistance even getting up from the bed. Patient was given IV Decadron and IM Norflex for pain control. He did note slight improvement in pain on reevaluation. Patient also started on IV heparin for acute DVT. Plan is to admit the patient for further work-up and management. Patient was agreeable with plan after shared decision making. Patient accepted for admission by Dr. Twin Davis. He remained hemodynamically stable in the ED. Non-plain film images such as CT, Ultrasound and MRI are read by the radiologist. Catherene Riedel radiographic images are visualized and preliminarily interpreted by the ED Provider with the below findings:    Chest x-ray with no obvious focal consolidation suggestive of pneumonia, large pleural effusions or pneumothorax. Normal cardiac silhouette. Pelvis x-ray with no identifiable fractures or dislocations.     CT lumbar spine with no obvious acute fractures or dislocations. No notable masses or significant canal stenosis seen. Discussion with Other Profesionals : Admitting Team who accepted patient for admission    Social Determinants : None    Records Reviewed : Outpatient Notes office visit on 6/20/2022 reviewed. Patient was seen by vascular surgery regarding vascular access for dialysis due to end-stage renal disease. Patient had history of previous left upper extremity radiocephalic AV fistula. Vascular surgery notes for shows okay to use for dialysis if needed. Chronic conditions: hypertension, CKD, hyperlipidemia and obesity    CONSULTS: Internal medicine      Disposition:   Admission    Consultation with Dr. Heloise Cheadle who accepted the patient for admission. The patient will be admitted for further treatment and evaluation for   1. Acute deep vein thrombosis (DVT) of femoral vein of left lower extremity (HCC)    2. Acute on chronic renal insufficiency    3. Unable to ambulate          Re-Evaluations/Consultations:             ED Course as of 03/13/23 1033   Sun Mar 12, 2023   1553 EKG: This EKG is signed and interpreted by me. Rate: 75  Rhythm: Sinus  Interpretation: Normal sinus rhythm, normal axis, nonspecific ST changes throughout, intervals normal, QTc is 446  Comparison: no previous EKG available    [KG]      ED Course User Index  [KG] Sophia Freeman,          This patient's ED course included: History, physical examination, reevaluation prior to disposition    This patient has remained hemodynamically stable during their ED course. Counseling: The emergency provider has spoken with the patient and wife and discussed todays results, in addition to providing specific details for the plan of care and counseling regarding the diagnosis and prognosis.   Questions are answered at this time and they are agreeable with the plan.       --------------------------------- IMPRESSION AND DISPOSITION ---------------------------------    IMPRESSION  1. Acute deep vein thrombosis (DVT) of femoral vein of left lower extremity (HCC)    2. Acute on chronic renal insufficiency    3. Unable to ambulate        DISPOSITION  Disposition: Admit to med/surg floor  Patient condition is stable        NOTE: This report was transcribed using voice recognition software.  Every effort was made to ensure accuracy; however, inadvertent computerized transcription errors may be present         Will Taylor DO  Resident  03/13/23 1045

## 2023-03-13 PROBLEM — I82.412 ACUTE DEEP VEIN THROMBOSIS (DVT) OF FEMORAL VEIN OF LEFT LOWER EXTREMITY (HCC): Status: ACTIVE | Noted: 2023-03-13

## 2023-03-13 LAB
ABO/RH: NORMAL
ALBUMIN SERPL-MCNC: 4.1 G/DL (ref 3.5–5.2)
ALP BLD-CCNC: 70 U/L (ref 40–129)
ALT SERPL-CCNC: 21 U/L (ref 0–40)
ANION GAP SERPL CALCULATED.3IONS-SCNC: 15 MMOL/L (ref 7–16)
ANTIBODY SCREEN: NORMAL
APTT: 30.9 SEC (ref 24.5–35.1)
APTT: 31.3 SEC (ref 24.5–35.1)
APTT: 32.1 SEC (ref 24.5–35.1)
APTT: >240 SEC (ref 24.5–35.1)
AST SERPL-CCNC: 17 U/L (ref 0–39)
BASOPHILS ABSOLUTE: 0.04 E9/L (ref 0–0.2)
BASOPHILS ABSOLUTE: 0.06 E9/L (ref 0–0.2)
BASOPHILS RELATIVE PERCENT: 0.5 % (ref 0–2)
BASOPHILS RELATIVE PERCENT: 0.7 % (ref 0–2)
BILIRUB SERPL-MCNC: 0.5 MG/DL (ref 0–1.2)
BUN BLDV-MCNC: 48 MG/DL (ref 6–23)
CALCIUM SERPL-MCNC: 9.2 MG/DL (ref 8.6–10.2)
CHLORIDE BLD-SCNC: 105 MMOL/L (ref 98–107)
CO2: 22 MMOL/L (ref 22–29)
CREAT SERPL-MCNC: 2.5 MG/DL (ref 0.7–1.2)
EKG ATRIAL RATE: 75 BPM
EKG P AXIS: 62 DEGREES
EKG P-R INTERVAL: 168 MS
EKG Q-T INTERVAL: 400 MS
EKG QRS DURATION: 94 MS
EKG QTC CALCULATION (BAZETT): 446 MS
EKG R AXIS: 42 DEGREES
EKG T AXIS: 30 DEGREES
EKG VENTRICULAR RATE: 75 BPM
EOSINOPHILS ABSOLUTE: 0 E9/L (ref 0.05–0.5)
EOSINOPHILS ABSOLUTE: 0 E9/L (ref 0.05–0.5)
EOSINOPHILS RELATIVE PERCENT: 0 % (ref 0–6)
EOSINOPHILS RELATIVE PERCENT: 0 % (ref 0–6)
GFR SERPL CREATININE-BSD FRML MDRD: 28 ML/MIN/1.73
GLUCOSE BLD-MCNC: 128 MG/DL (ref 74–99)
HCT VFR BLD CALC: 42.9 % (ref 37–54)
HCT VFR BLD CALC: 44.2 % (ref 37–54)
HEMOGLOBIN: 14.1 G/DL (ref 12.5–16.5)
HEMOGLOBIN: 14.2 G/DL (ref 12.5–16.5)
IMMATURE GRANULOCYTES #: 0.05 E9/L
IMMATURE GRANULOCYTES #: 0.07 E9/L
IMMATURE GRANULOCYTES %: 0.6 % (ref 0–5)
IMMATURE GRANULOCYTES %: 0.8 % (ref 0–5)
LYMPHOCYTES ABSOLUTE: 0.72 E9/L (ref 1.5–4)
LYMPHOCYTES ABSOLUTE: 0.85 E9/L (ref 1.5–4)
LYMPHOCYTES RELATIVE PERCENT: 10.8 % (ref 20–42)
LYMPHOCYTES RELATIVE PERCENT: 8.1 % (ref 20–42)
MCH RBC QN AUTO: 28.8 PG (ref 26–35)
MCH RBC QN AUTO: 29 PG (ref 26–35)
MCHC RBC AUTO-ENTMCNC: 32.1 % (ref 32–34.5)
MCHC RBC AUTO-ENTMCNC: 32.9 % (ref 32–34.5)
MCV RBC AUTO: 88.1 FL (ref 80–99.9)
MCV RBC AUTO: 89.7 FL (ref 80–99.9)
MONOCYTES ABSOLUTE: 0.13 E9/L (ref 0.1–0.95)
MONOCYTES ABSOLUTE: 0.2 E9/L (ref 0.1–0.95)
MONOCYTES RELATIVE PERCENT: 1.7 % (ref 2–12)
MONOCYTES RELATIVE PERCENT: 2.2 % (ref 2–12)
NEUTROPHILS ABSOLUTE: 6.77 E9/L (ref 1.8–7.3)
NEUTROPHILS ABSOLUTE: 7.84 E9/L (ref 1.8–7.3)
NEUTROPHILS RELATIVE PERCENT: 86.4 % (ref 43–80)
NEUTROPHILS RELATIVE PERCENT: 88.2 % (ref 43–80)
PDW BLD-RTO: 14.7 FL (ref 11.5–15)
PDW BLD-RTO: 14.8 FL (ref 11.5–15)
PLATELET # BLD: 173 E9/L (ref 130–450)
PLATELET # BLD: 185 E9/L (ref 130–450)
PMV BLD AUTO: 11.4 FL (ref 7–12)
PMV BLD AUTO: 11.7 FL (ref 7–12)
POTASSIUM REFLEX MAGNESIUM: 3.7 MMOL/L (ref 3.5–5)
RBC # BLD: 4.87 E12/L (ref 3.8–5.8)
RBC # BLD: 4.93 E12/L (ref 3.8–5.8)
REASON FOR REJECTION: NORMAL
REJECTED TEST: NORMAL
SODIUM BLD-SCNC: 142 MMOL/L (ref 132–146)
TOTAL PROTEIN: 7 G/DL (ref 6.4–8.3)
WBC # BLD: 7.8 E9/L (ref 4.5–11.5)
WBC # BLD: 8.9 E9/L (ref 4.5–11.5)

## 2023-03-13 PROCEDURE — 76937 US GUIDE VASCULAR ACCESS: CPT

## 2023-03-13 PROCEDURE — 2580000003 HC RX 258: Performed by: FAMILY MEDICINE

## 2023-03-13 PROCEDURE — 80053 COMPREHEN METABOLIC PANEL: CPT

## 2023-03-13 PROCEDURE — 1200000000 HC SEMI PRIVATE

## 2023-03-13 PROCEDURE — 85025 COMPLETE CBC W/AUTO DIFF WBC: CPT

## 2023-03-13 PROCEDURE — 6360000002 HC RX W HCPCS: Performed by: FAMILY MEDICINE

## 2023-03-13 PROCEDURE — 85730 THROMBOPLASTIN TIME PARTIAL: CPT

## 2023-03-13 PROCEDURE — 36415 COLL VENOUS BLD VENIPUNCTURE: CPT

## 2023-03-13 PROCEDURE — 99223 1ST HOSP IP/OBS HIGH 75: CPT | Performed by: SURGERY

## 2023-03-13 PROCEDURE — 86901 BLOOD TYPING SEROLOGIC RH(D): CPT

## 2023-03-13 PROCEDURE — 93010 ELECTROCARDIOGRAM REPORT: CPT | Performed by: INTERNAL MEDICINE

## 2023-03-13 PROCEDURE — 86900 BLOOD TYPING SEROLOGIC ABO: CPT

## 2023-03-13 PROCEDURE — 86850 RBC ANTIBODY SCREEN: CPT

## 2023-03-13 PROCEDURE — 6370000000 HC RX 637 (ALT 250 FOR IP): Performed by: FAMILY MEDICINE

## 2023-03-13 RX ORDER — TOPIRAMATE 25 MG/1
25 TABLET ORAL EVERY EVENING
Status: DISCONTINUED | OUTPATIENT
Start: 2023-03-13 | End: 2023-03-15 | Stop reason: HOSPADM

## 2023-03-13 RX ORDER — PROPRANOLOL HYDROCHLORIDE 20 MG/1
40 TABLET ORAL EVERY EVENING
Status: DISCONTINUED | OUTPATIENT
Start: 2023-03-13 | End: 2023-03-15 | Stop reason: HOSPADM

## 2023-03-13 RX ORDER — HYDROCHLOROTHIAZIDE 25 MG/1
25 TABLET ORAL EVERY EVENING
Status: DISCONTINUED | OUTPATIENT
Start: 2023-03-13 | End: 2023-03-15 | Stop reason: HOSPADM

## 2023-03-13 RX ORDER — COLCHICINE 0.6 MG/1
0.6 TABLET ORAL EVERY EVENING
Status: DISCONTINUED | OUTPATIENT
Start: 2023-03-13 | End: 2023-03-15 | Stop reason: HOSPADM

## 2023-03-13 RX ADMIN — POTASSIUM CHLORIDE 20 MEQ: 1500 TABLET, EXTENDED RELEASE ORAL at 17:28

## 2023-03-13 RX ADMIN — HYDROCHLOROTHIAZIDE 25 MG: 25 TABLET ORAL at 17:26

## 2023-03-13 RX ADMIN — Medication 2000 UNITS: at 17:26

## 2023-03-13 RX ADMIN — LISINOPRIL 40 MG: 10 TABLET ORAL at 09:53

## 2023-03-13 RX ADMIN — SODIUM CHLORIDE, PRESERVATIVE FREE 10 ML: 5 INJECTION INTRAVENOUS at 09:56

## 2023-03-13 RX ADMIN — DICYCLOMINE HYDROCHLORIDE 40 MG: 10 CAPSULE ORAL at 13:40

## 2023-03-13 RX ADMIN — PROPRANOLOL HYDROCHLORIDE 40 MG: 20 TABLET ORAL at 17:26

## 2023-03-13 RX ADMIN — AMLODIPINE BESYLATE 10 MG: 10 TABLET ORAL at 17:26

## 2023-03-13 RX ADMIN — TOPIRAMATE 25 MG: 25 TABLET, FILM COATED ORAL at 17:26

## 2023-03-13 RX ADMIN — DICYCLOMINE HYDROCHLORIDE 40 MG: 10 CAPSULE ORAL at 09:53

## 2023-03-13 RX ADMIN — DICYCLOMINE HYDROCHLORIDE 40 MG: 10 CAPSULE ORAL at 21:09

## 2023-03-13 RX ADMIN — ALLOPURINOL 100 MG: 100 TABLET ORAL at 09:53

## 2023-03-13 RX ADMIN — HEPARIN SODIUM 16.97 UNITS/KG/HR: 10000 INJECTION, SOLUTION INTRAVENOUS at 16:01

## 2023-03-13 RX ADMIN — DOCUSATE SODIUM 100 MG: 100 CAPSULE, LIQUID FILLED ORAL at 17:26

## 2023-03-13 RX ADMIN — HEPARIN SODIUM 18.61 UNITS/KG/HR: 10000 INJECTION, SOLUTION INTRAVENOUS at 22:51

## 2023-03-13 ASSESSMENT — PAIN DESCRIPTION - LOCATION
LOCATION: LEG
LOCATION: LEG

## 2023-03-13 ASSESSMENT — PAIN SCALES - GENERAL
PAINLEVEL_OUTOF10: 1
PAINLEVEL_OUTOF10: 0
PAINLEVEL_OUTOF10: 3
PAINLEVEL_OUTOF10: 3

## 2023-03-13 ASSESSMENT — PAIN DESCRIPTION - PAIN TYPE: TYPE: ACUTE PAIN

## 2023-03-13 ASSESSMENT — PAIN DESCRIPTION - ORIENTATION
ORIENTATION: LEFT;LOWER
ORIENTATION: LEFT;LOWER

## 2023-03-13 ASSESSMENT — PAIN DESCRIPTION - ONSET: ONSET: ON-GOING

## 2023-03-13 ASSESSMENT — PAIN - FUNCTIONAL ASSESSMENT
PAIN_FUNCTIONAL_ASSESSMENT: PREVENTS OR INTERFERES SOME ACTIVE ACTIVITIES AND ADLS
PAIN_FUNCTIONAL_ASSESSMENT: PREVENTS OR INTERFERES SOME ACTIVE ACTIVITIES AND ADLS

## 2023-03-13 ASSESSMENT — PAIN DESCRIPTION - DESCRIPTORS
DESCRIPTORS: ACHING;CRAMPING;DISCOMFORT
DESCRIPTORS: ACHING;CRAMPING;DISCOMFORT

## 2023-03-13 ASSESSMENT — PAIN DESCRIPTION - FREQUENCY: FREQUENCY: CONTINUOUS

## 2023-03-13 NOTE — H&P
Hospitalist History & Physical      PCP: Rowdy Baker MD    Date of Service: Pt seen/examined on 3/12/2023     Chief Complaint:  had concerns including Extremity Weakness (Pt states bilateral leg weakness that has gotten progressively worse since Wednesday. Pt states he is having trouble ambulating. Denies loss of bowel or bladder. ) and Fall (Pt states he fell this AM -thinners ). History Of Present Illness:    Mr. Macario Brand, a 74y.o. year old male  who  has a past medical history of A-V fistula ([facility] [address] Arthritis, Back pain, CKD (chronic kidney disease), HTN (hypertension), Keratoconus, Kidney disease, Lymphadenopathy, Migraine, and Prolonged emergence from general anesthesia. Macario Brand is a 74 y.o. male with past medical history of hypertension, CKD, hyperlipidemia and obesity who presents to the emergency department due to bilateral extremity weakness and fall. Patient states that his symptoms have been ongoing since Wednesday. Patient denies any inciting factors for symptoms. He endorses gradual onset with progressive worsening. Patient states that on Wednesday his left leg was hurting him a lot and he was starting to have some difficulty ambulating. The next day, his left leg started to bother him as well. Patient states that both legs have been more painful over the past few days and now he is essentially unable to ambulate. He states that his pain feels like a sciatic pain, especially on the left. Patient noting that when he tried to get in the car to come to the ED today he did fall. Patient denies any head trauma with the fall. He is not on any blood thinners. He denies any trauma or injury to the legs where his pain is present.   Patient denies any other associated symptoms including nausea, vomiting, fever, chills, headache, lightheadedness, dizziness, syncope, chest pain, shortness of breath, abdominal pain, back pain, neck pain, urinary symptoms, constipation or diarrhea. Patient denying any recent sick contacts or illnesses. On initial assessment he is well-appearing in no acute distress. Vital signs within normal limits and stable. The patient is afebrile. Laboratory studies demonstrate BUN is 51, creatinine 2.8, troponin 31 with repeat of 30. Ultrasound of the lower extremities reveals positive DVT left lower extremity. Patient started on heparin infusion. Medicine consulted for admission. Notified by ER that PTT was greater than 240. Heparin infusion was stopped at that time. On arrival to the floor patient noted to have a large hematoma on his left lower extremity lateral aspect. It appears to be getting larger as we were talking. Stat consult to vascular surgery was made. Past Medical History:   Diagnosis Date    A-V fistula (HCC)     left arm    Arthritis     Back pain     had LESI    CKD (chronic kidney disease)     stage 4    HTN (hypertension)     Keratoconus     bilateral    Kidney disease     Lymphadenopathy     lower legs    Migraine     Prolonged emergence from general anesthesia        Past Surgical History:   Procedure Laterality Date    COLONOSCOPY      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    DIALYSIS FISTULA CREATION Left 2021    CREATION AV FISTULA LEFT ARM performed by Renetta Zavala MD at [facility] 2021    REVISION ARTERIOVENOUS FISTULA LEFT ARM performed by Renetta Zavala MD at [address] [place] [address]  2002    lap tricia    UPPER GASTROINTESTINAL ENDOSCOPY N/A 2019    EGD FOREIGN BODY REMOVAL performed by Shalini Pablo MD at [address] [place] [address]       Prior to Admission medications    Medication Sig Start Date End Date Taking?  Authorizing Provider   allopurinol (ZYLOPRIM) 100 MG tablet Take 100 mg by mouth daily    Historical Provider, MD   propranolol (INDERAL) 40 MG tablet Take 40 mg by mouth every evening    Historical Provider, MD   hydroCHLOROthiazide (HYDRODIURIL) 25 MG tablet Take 25 mg by mouth every evening    Historical Provider, MD   colchicine (COLCRYS) 0.6 MG tablet Take 0.6 mg by mouth every evening     Historical Provider, MD   docusate sodium (COLACE) 100 MG capsule Take 100 mg by mouth every evening     Historical Provider, MD   dicyclomine (BENTYL) 20 MG tablet Take 40 mg by mouth 3 times daily     Historical Provider, MD   gabapentin (NEURONTIN) 300 MG capsule Take 300 mg by mouth 3 times daily as needed (for sciatica or back pain.). Instructed to take with sip water am of procedure, if needed. Historical Provider, MD   amLODIPine (NORVASC) 10 MG tablet Take 10 mg by mouth every evening     Historical Provider, MD   benazepril (LOTENSIN) 40 MG tablet Take 40 mg by mouth daily     Historical Provider, MD   furosemide (LASIX) 20 MG tablet Take 20 mg by mouth every evening     Historical Provider, MD   topiramate (TOPAMAX) 25 MG tablet Take 25 mg by mouth every evening     Historical Provider, MD   SUMAtriptan (IMITREX) 25 MG tablet Take 25 mg by mouth 2 times daily as needed for Migraine    Historical Provider, MD   potassium chloride (KLOR-CON M) 20 MEQ extended release tablet Take 20 mEq by mouth every evening     Historical Provider, MD   Cholecalciferol (VITAMIN D3) 2000 units CAPS Take 1 capsule by mouth every evening     Historical Provider, MD   Coenzyme Q10 (COQ10) 100 MG CAPS Take 1 capsule by mouth every evening     Historical Provider, MD         Allergies:  Codeine and Morphine    Social History:    TOBACCO:   reports that he has never smoked. He has never used smokeless tobacco.  ETOH:   reports no history of alcohol use. Family History:    Reviewed in detail and negative for DM, CAD, Cancer, CVA. Positive as follows\"  History reviewed. No pertinent family history. REVIEW OF SYSTEMS:   Pertinent positives as noted in the HPI. All other systems reviewed and negative.     PHYSICAL EXAM:  /70   Pulse 86   Temp 97.3 °F (36.3 °C)   Resp 18   Wt 269 lb (122 kg)   SpO2 95%   BMI 42.13 kg/m²   General appearance: No apparent distress, appears stated age and cooperative. HEENT: Normal cephalic, atraumatic without obvious deformity. Pupils equal, round, and reactive to light. Extra ocular muscles intact. Conjunctivae/corneas clear. Neck: Supple, with full range of motion. No jugular venous distention. Trachea midline. Respiratory: CTA  Cardiovascular: RRR  Abdomen: S/NT/ND  Musculoskeletal: No clubbing, cyanosis, edema of bilateral lower extremities. Brisk capillary refill. Large hematoma left lower extremity lateral aspect  Skin: Normal skin color. No rashes or lesions. Neurologic:  Neurovascularly intact without any focal sensory/motor deficits. Cranial nerves: II-XII intact, grossly non-focal.  Psychiatric: Alert and oriented, thought content appropriate, normal insight    Reviewed EKG and CXR personally      CBC:   Recent Labs     03/12/23  1545   WBC 10.1   RBC 5.23   HGB 14.8   HCT 46.4   MCV 88.7   RDW 14.9        BMP:   Recent Labs     03/12/23  1545      K 4.2      CO2 24   BUN 51*   CREATININE 2.8*     LFT:  Recent Labs     03/12/23  1545   PROT 7.5   ALKPHOS 73   ALT 24   AST 21   BILITOT 0.4     CE:  No results for input(s): Smiley Earnest in the last 72 hours. PT/INR: No results for input(s): INR, APTT in the last 72 hours. BNP: No results for input(s): BNP in the last 72 hours.   ESR: No results found for: SEDRATE  CRP: No results found for: CRP  D Dimer: No results found for: DDIMER   Folate and B12: No results found for: [id number], No results found for: FOLATE  Lactic Acid: No results found for: LACTA  Thyroid Studies:   Lab Results   Component Value Date    TSH 1.540 04/25/2019       Oupatient labs:  Lab Results   Component Value Date    CHOL 194 04/25/2019    TRIG 178 (H) 04/25/2019    HDL 42 04/25/2019    LDLCALC 116 (H) 04/25/2019    TSH 1.540 04/25/2019    INR 1.0 04/28/2021 Urinalysis:    Lab Results   Component Value Date/Time    NITRU Negative 03/12/2023 06:22 PM    WBCUA 0-1 03/12/2023 06:22 PM    BACTERIA NONE SEEN 03/12/2023 06:22 PM    RBCUA NONE 03/12/2023 06:22 PM    BLOODU Negative 03/12/2023 06:22 PM    SPECGRAV 1.015 03/12/2023 06:22 PM    GLUCOSEU Negative 03/12/2023 06:22 PM       Imaging:  XR PELVIS (1-2 VIEWS)    Result Date: 3/12/2023  EXAMINATION: ONE XRAY VIEW OF THE PELVIS 3/12/2023 5:05 pm COMPARISON: None. HISTORY: ORDERING SYSTEM PROVIDED HISTORY: weakness TECHNOLOGIST PROVIDED HISTORY: Reason for exam:->weakness What reading provider will be dictating this exam?->CRC FINDINGS: Lower lumbar degenerative disease. Mild bilateral hip DJD. No fracture or dislocation. No acute osseous findings. CT LUMBAR SPINE WO CONTRAST    Result Date: 3/12/2023  EXAMINATION: CT OF THE LUMBAR SPINE WITHOUT CONTRAST  3/12/2023 TECHNIQUE: CT of the lumbar spine was performed without the administration of intravenous contrast. Multiplanar reformatted images are provided for review. Adjustment of mA and/or kV according to patient size was utilized. Automated exposure control, iterative reconstruction, and/or weight based adjustment of the mA/kV was utilized to reduce the radiation dose to as low as reasonably achievable. COMPARISON: None HISTORY: ORDERING SYSTEM PROVIDED HISTORY: beto morel TECHNOLOGIST PROVIDED HISTORY: Reason for exam:->cant walk Decision Support Exception - unselect if not a suspected or confirmed emergency medical condition->Emergency Medical Condition (MA) What reading provider will be dictating this exam?->CRC FINDINGS: BONES/ALIGNMENT: There is preservation of the normal lordotic curve. There is no evidence of fracture or subluxation. DEGENERATIVE CHANGES: Moderate to marked osteo-degenerative changes are noted throughout the visualized thoracolumbar spine, with anterior and lateral spondylitic ridging present.   It is most significant in the lower thoracic spine and at L3-4. Multilevel, bilateral facet hypertrophy and sclerosis is identified. Mild bilateral neural foraminal narrowing is identified at L4-5. Moderate bilateral neural foraminal narrowing is seen at L5-S1. Significant osteo-degenerative changes are noted at the right SI joint, with osseous fusion. Moderate osteo-degenerative changes are noted at the left SI joint. No other significant osseous abnormality is identified. Intervertebral disc space narrowing is seen from L3-4 down to L5-S1. A small posterior ridge-disc complex is identified at L4-5, causing impression on the ventral thecal sac. A small posterior ridge-disc complex is seen at L5-S1, causing impression on the ventral thecal sac. The remainder of the visualized thoracolumbar cord and thecal sac are unremarkable. There is significant spinal canal stenosis is identified at T12-L1, secondary to the productive osteophyte formation. Moderate spinal canal stenosis is seen at L2-3, secondary to the facet arthropathy. Significant spinal canal stenosis is seen at L3-4, secondary to the facet arthropathy. Moderate to marked spinal canal stenosis is seen at L4-5, secondary to the ridge-disc complex and facet arthropathy. Mild spinal canal stenosis is seen at L5-S1, secondary to the ridge-disc complex and facet arthropathy. SOFT TISSUES/RETROPERITONEUM: No paraspinal mass is seen. 1.  No fracture or subluxation. 2.  Osteo-degenerative changes and discogenic disc disease, as described above. 3.  Small posterior ridge-disc complexes at L4-5 and L5-S1. 4.  Multilevel spinal canal stenosis, with significant stenosis seen at T12-L1 and L3-4. XR CHEST PORTABLE    Result Date: 3/12/2023  EXAMINATION: ONE XRAY VIEW OF THE CHEST 3/12/2023 5:04 pm COMPARISON: None.  HISTORY: ORDERING SYSTEM PROVIDED HISTORY: fatigue, weakness TECHNOLOGIST PROVIDED HISTORY: Reason for exam:->fatigue, weakness What reading provider will be dictating this exam?->CRC FINDINGS: Right hemidiaphragm eventration and right lung volume loss. Left lung hyperinflated. No consolidation, pleural effusion, or pneumothorax. Cardiomegaly. Right hemidiaphragm eventration and right lung volume loss. Hyperinflated left lung. Mild cardiomegaly. US DUP LOWER EXTREMITIES BILATERAL VENOUS    Result Date: 3/12/2023  Patient MRN:  [id number] : 1955 Age: 74 years Gender: Male Order Date:  3/12/2023 4:13 PM EXAM: US DUP LOWER EXTREMITIES BILATERAL VENOUS NUMBER OF IMAGES:  48 INDICATION:  r/o DVT r/o DVT What reading provider will be dictating this exam?->[facility] There is evidence for deep venous thrombosis in the left superficial femoral vein, the tibioperoneal trunk the common femoral vein and the popliteal vein There is otherwise good compressibility, there is good augmentation, there is good color flow. There is evidence for deep venous thrombosis ALERT:  THIS IS AN ABNORMAL REPORT       ASSESSMENT:  -Left lower extremity DVT  -Supratherapeutic PTT  -Large hematoma left lower extremity  -Ambulatory dysfunction  -CKD stage IV  -Hypertension      PLAN:  -Admit to medicine  -Consult vascular surgery  -Heparin infusion  -PT/OT  -Continue home medications  -CM/SW for possible placement        Diet: No diet orders on file  Code Status: Prior  Surrogate decision maker confirmed with patient:   Extended Emergency Contact Information  Primary Emergency Contact: Dafne Krishna  Phone: [phone]  Mobile Phone: [phone]  Relation: Spouse  Preferred language: English   needed?  No  Secondary Emergency Contact: Elvis March  [place] Phone: [phone]  Relation: Brother/Sister    DVT Prophylaxis: []Lovenox []Heparin []PCD [] [address] []Encouraged ambulation  Disposition: []Med/Surg [] Intermediate [] ICU/CCU  Admit status: [] Observation [] Inpatient     +++++++++++++++++++++++++++++++++++++++++++++++++  Princess Yip DO  +++++++++++++++++++++++++++++++++++++++++++++++++  NOTE: This report was transcribed using voice recognition software. Every effort was made to ensure accuracy; however, inadvertent computerized transcription errors may be present.

## 2023-03-13 NOTE — PROGRESS NOTES
Vascular Attending consulted Stat per Dr Katerina Carney for a growing hematoma of the LLE and DVT of RLE.  Vascular LEE ANN came to assess patient, wrapped LLE with Coban and ordered stat labs for the morning with a type a screen as well

## 2023-03-13 NOTE — ED NOTES
Dr. Manuela Santo notified of patient aPTT.  Per Dr. Rosalinda Ayon continue to follow heparin drip protocol      Gladystine Pod, RN  03/13/23 [id number]

## 2023-03-13 NOTE — ACP (ADVANCE CARE PLANNING)
Advance Care Planning   The patient has the following advanced directives on file:  Advance Directives       [facility] ACP-Advance Directive ACP-Power of     Not on File Not on File Not on File Not on File            The patient has appointed the following active healthcare agents:  Jelnea Markham - wife  [phone]      The Patient has the following current code status:    Code Status: Full Code      Lizzy Kauffman, [facility]  3/13/2023

## 2023-03-13 NOTE — CONSULTS
VASCULAR SURGERY CONSULT NOTE    Reason for Consult:  Expanding LLE hematoma     HPI:    Dinah Valles is a 74 y.o. male with hx of CKD with left AVF in place that has not yet been used who presented with leg pain and difficulty ambulating and was found to have DVTs. He was placed on a heparin gtt here and shortly after noted an expanding hematoma over the left shin with no external bleeding. The heparin gtt is now stopped. He has no history of DVTs prior to this episode, no family history of DVTs or coagulation disorders. He does state that just prior to coming to the hospital he fell and believes he may of landed on his leg. Hematoma on L shin is soft, non expanding, subcutaneous and mobile. Ecchymosis around it not external bleeding. Tender to palpation. No lower extremity edema.       Afebrile, hemodynamically stable   Hb 14.1       ROS: Negative if blank [], Positive if [x]  General Vascular   [] Fevers [] Claudication (Blocks)   [] Chills [] Rest Pain   [] Weight Loss [] Tissue Loss   [] Chest Pain [] Clotting Disorder   [] SOB at rest [] Leg Swelling   [] SOB with exertion [x] DVT/PE      [] Nausea    [] Vomiting [] Stroke/TIA   [] Abdominal Pain [] Focal weakness   [] Melena [] Slurred Speech   [] Hematochezia [] Vision Changes   [] Hematuria    [] Dysuria [] Hx of Central Catheters   [] Wears Glasses/Contacts [x] Dialysis-graft but has not started    [] Blindness    [x] R Hand Dominant   [] Difficulty swallowing        Past Medical History:   Diagnosis Date    A-V fistula (HCC)     left arm    Arthritis     Back pain 2019    had LESI    CKD (chronic kidney disease)     stage 4    HTN (hypertension)     Keratoconus     bilateral    Kidney disease     Lymphadenopathy     lower legs    Migraine     Prolonged emergence from general anesthesia         Past Surgical History:   Procedure Laterality Date    COLONOSCOPY      CORNEAL TRANSPLANT  05/2001    right eye    DIALYSIS FISTULA CREATION Left 2/25/2021 CREATION AV FISTULA LEFT ARM performed by Darnell Sánchez MD at [address] Left 4/29/2021    REVISION ARTERIOVENOUS FISTULA LEFT ARM performed by Darnell Sánchez MD at [address]  05/2002    lap tricia    UPPER GASTROINTESTINAL ENDOSCOPY N/A 4/7/2019    EGD FOREIGN BODY REMOVAL performed by Jerilee Peabody, MD at [address]:   [Held by provider] heparin (PORCINE) Infusion Stopped (03/13/23 0332)    sodium chloride        heparin (porcine), heparin (porcine), gabapentin, sodium chloride flush, sodium chloride, promethazine **OR** ondansetron, polyethylene glycol, acetaminophen **OR** acetaminophen    allopurinol  100 mg Oral Daily    amLODIPine  10 mg Oral QPM    lisinopril  40 mg Oral Daily    vitamin D  2,000 Units Oral QPM    colchicine  0.6 mg Oral QPM    dicyclomine  40 mg Oral TID    docusate sodium  100 mg Oral QPM    hydroCHLOROthiazide  25 mg Oral QPM    potassium chloride  20 mEq Oral QPM    propranolol  40 mg Oral QPM    topiramate  25 mg Oral QPM    sodium chloride flush  10 mL IntraVENous 2 times per day        ALLERGIES:  Codeine and Morphine    Social History     Socioeconomic History    Marital status:      Spouse name: Not on file    Number of children: Not on file    Years of education: Not on file    Highest education level: Not on file   Occupational History    Not on file   Tobacco Use    Smoking status: Never    Smokeless tobacco: Never   Vaping Use    Vaping Use: Never used   Substance and Sexual Activity    Alcohol use: Never    Drug use: Never    Sexual activity: Not on file   Other Topics Concern    Not on file   Social History Narrative    Not on file     Social Determinants of Health     Financial Resource Strain: Not on file   Food Insecurity: Not on file   Transportation Needs: Not on file   Physical Activity: Not on file   Stress: Not on file   Social Connections: Not on file   Intimate Partner Violence: Not on file   Housing Stability: Not on file        History reviewed. No pertinent family history. PHYSICAL EXAM:    /62   Pulse 93   Temp 97.3 °F (36.3 °C)   Resp 18   Wt 269 lb (122 kg)   SpO2 93%   BMI 42.13 kg/m²   CONSTITUTIONAL:  awake, alert, cooperative, no apparent distress, and appears stated age  NEURO:  Normal  EYES:  lids and lashes normal, sclera clear and conjunctiva normal  ENT:  normocepalic, without obvious abnormality  NECK:  supple, symmetrical, trachea midline, no jugular venous distension, no carotid bruits  LUNGS:  no increased work of breathing  CARDIOVASCULAR:  regular rate and rhythm  ABDOMEN:  soft, non-distended, non-tender, Aorta is not palpable  SKIN:  LLE hematoma soft non expanding, + ecchymosis     EXTREMITIES:    R UE Swelling absent   Incisions absent         5/5 Strength, Intact Sensation    L UE Swelling absent   Incisions absent         5/5 Strength, Intact Sensation    R LE Edema absent     Incisions absent    Varicose veins absent    Wounds absent    Normal Capillary Refill   5/5 Strength, Intact Sensation    L LE Edema absent     Incisions absent    Varicose veins absent    Wounds absent    Normal Capillary Refill   5/5 Strength, Intact Sensation    R brachial - L brachial -   R radial 2+ L radial 2+   R femoral - L femoral -   R popliteal - L popliteal -   R posterior tibial 2+ L posterior tibial 2+   R dorsalis pedis 2+ L dorsalis pedis 2+       LABS:    Lab Results   Component Value Date    WBC 7.8 03/13/2023    HGB 14.1 03/13/2023    HCT 42.9 03/13/2023     03/13/2023    PROTIME 11.0 04/28/2021    INR 1.0 04/28/2021    K 3.7 03/13/2023    BUN 48 (H) 03/13/2023    CREATININE 2.5 (H) 03/13/2023       RADIOLOGY:  XR PELVIS (1-2 VIEWS)    Result Date: 3/12/2023  EXAMINATION: ONE XRAY VIEW OF THE PELVIS 3/12/2023 5:05 pm COMPARISON: None.  HISTORY: ORDERING SYSTEM PROVIDED HISTORY: weakness TECHNOLOGIST PROVIDED HISTORY: Reason for exam:->weakness What reading provider will be dictating this exam?->CRC FINDINGS: Lower lumbar degenerative disease. Mild bilateral hip DJD. No fracture or dislocation. No acute osseous findings. CT LUMBAR SPINE WO CONTRAST    Result Date: 3/12/2023  EXAMINATION: CT OF THE LUMBAR SPINE WITHOUT CONTRAST  3/12/2023 TECHNIQUE: CT of the lumbar spine was performed without the administration of intravenous contrast. Multiplanar reformatted images are provided for review. Adjustment of mA and/or kV according to patient size was utilized. Automated exposure control, iterative reconstruction, and/or weight based adjustment of the mA/kV was utilized to reduce the radiation dose to as low as reasonably achievable. COMPARISON: None HISTORY: ORDERING SYSTEM PROVIDED HISTORY: [employer] TECHNOLOGIST PROVIDED HISTORY: Reason for exam:->[employer] Decision Support Exception - unselect if not a suspected or confirmed emergency medical condition->Emergency Medical Condition (MA) What reading provider will be dictating this exam?->CRC FINDINGS: BONES/ALIGNMENT: There is preservation of the normal lordotic curve. There is no evidence of fracture or subluxation. DEGENERATIVE CHANGES: Moderate to marked osteo-degenerative changes are noted throughout the visualized thoracolumbar spine, with anterior and lateral spondylitic ridging present. It is most significant in the lower thoracic spine and at L3-4. Multilevel, bilateral facet hypertrophy and sclerosis is identified. Mild bilateral neural foraminal narrowing is identified at L4-5. Moderate bilateral neural foraminal narrowing is seen at L5-S1. Significant osteo-degenerative changes are noted at the right SI joint, with osseous fusion. Moderate osteo-degenerative changes are noted at the left SI joint. No other significant osseous abnormality is identified. Intervertebral disc space narrowing is seen from L3-4 down to L5-S1.   A small posterior ridge-disc complex is identified at L4-5, causing impression on the ventral thecal sac. A small posterior ridge-disc complex is seen at L5-S1, causing impression on the ventral thecal sac. The remainder of the visualized thoracolumbar cord and thecal sac are unremarkable. There is significant spinal canal stenosis is identified at T12-L1, secondary to the productive osteophyte formation. Moderate spinal canal stenosis is seen at L2-3, secondary to the facet arthropathy. Significant spinal canal stenosis is seen at L3-4, secondary to the facet arthropathy. Moderate to marked spinal canal stenosis is seen at L4-5, secondary to the ridge-disc complex and facet arthropathy. Mild spinal canal stenosis is seen at L5-S1, secondary to the ridge-disc complex and facet arthropathy. SOFT TISSUES/RETROPERITONEUM: No paraspinal mass is seen. 1.  No fracture or subluxation. 2.  Osteo-degenerative changes and discogenic disc disease, as described above. 3.  Small posterior ridge-disc complexes at L4-5 and L5-S1. 4.  Multilevel spinal canal stenosis, with significant stenosis seen at T12-L1 and L3-4. XR CHEST PORTABLE    Result Date: 3/12/2023  EXAMINATION: ONE XRAY VIEW OF THE CHEST 3/12/2023 5:04 pm COMPARISON: None. HISTORY: ORDERING SYSTEM PROVIDED HISTORY: fatigue, weakness TECHNOLOGIST PROVIDED HISTORY: Reason for exam:->fatigue, weakness What reading provider will be dictating this exam?->CRC FINDINGS: Right hemidiaphragm eventration and right lung volume loss. Left lung hyperinflated. No consolidation, pleural effusion, or pneumothorax. Cardiomegaly. Right hemidiaphragm eventration and right lung volume loss. Hyperinflated left lung. Mild cardiomegaly.      US DUP LOWER EXTREMITIES BILATERAL VENOUS    Result Date: 3/12/2023  Patient MRN:  [id number] : 1955 Age: 74 years Gender: Male Order Date:  3/12/2023 4:13 PM EXAM: US DUP LOWER EXTREMITIES BILATERAL VENOUS NUMBER OF IMAGES:  48 INDICATION:  r/o DVT r/o DVT What reading provider will be dictating this exam?->[facility] There is evidence for deep venous thrombosis in the left superficial femoral vein, the tibioperoneal trunk the common femoral vein and the popliteal vein There is otherwise good compressibility, there is good augmentation, there is good color flow. There is evidence for deep venous thrombosis ALERT:  THIS IS AN ABNORMAL REPORT         ASSESSMENT/PLAN:  Soft tissue hematoma in the setting of trauma     Maintain compression wrapping over LLE for 24 hours, we will take it down tomorrow   Ok for diet   Ok to continue heparin drip WITHOUT the bolus dose.    Monitor H/H daily      Vascular Studies -- Kiya Rush MD  Surgery Resident PGY-2  3/13/2023  6:47 AM

## 2023-03-13 NOTE — PROGRESS NOTES
Hospitalist Progress Note      PCP: Jorje Bernheim, MD    Date of Admission: 3/12/2023    Chief Complaint: Expanding LLE hematoma , LLE DVT    Hospital Course:    Geovanna Jeffries is a 74 y.o. male with hx of CKD with left AVF in place that has not yet been used who presented with leg pain and difficulty ambulating and was found to have DVTs. He was placed on a heparin gtt here and shortly after noted an expanding hematoma over the left shin with no external bleeding. The heparin gtt is now stopped. He has no history of DVTs prior to this episode, no family history of DVTs or coagulation disorders. He does state that just prior to coming to the hospital he fell and believes he may of landed on his leg. Subjective: Patient was seen at bedside this morning, discussed about the findings of the ultrasound-DVT in the left lower extremity.   Continues to be on heparin drip per vascular surgery and the compression wrapping      Medications:  Reviewed    Infusion Medications    heparin (PORCINE) Infusion Stopped (03/13/23 0332)    sodium chloride       Scheduled Medications    colchicine  0.6 mg Oral QPM    hydroCHLOROthiazide  25 mg Oral QPM    propranolol  40 mg Oral QPM    topiramate  25 mg Oral QPM    allopurinol  100 mg Oral Daily    amLODIPine  10 mg Oral QPM    lisinopril  40 mg Oral Daily    vitamin D  2,000 Units Oral QPM    dicyclomine  40 mg Oral TID    docusate sodium  100 mg Oral QPM    potassium chloride  20 mEq Oral QPM    sodium chloride flush  10 mL IntraVENous 2 times per day     PRN Meds: heparin (porcine), heparin (porcine), gabapentin, sodium chloride flush, sodium chloride, promethazine **OR** ondansetron, polyethylene glycol, acetaminophen **OR** acetaminophen      Intake/Output Summary (Last 24 hours) at 3/13/2023 1422  Last data filed at 3/13/2023 1409  Gross per 24 hour   Intake --   Output 350 ml   Net -350 ml       Exam:    /85   Pulse 87   Temp 97.6 °F (36.4 °C) (Temporal) Resp 18   Ht 5' 7\" (1.702 m)   Wt 269 lb (122 kg)   SpO2 96%   BMI 42.13 kg/m²     General appearance: Morbidly obese, appears stated age and cooperative. HEENT: Pupils equal, round, and reactive to light. Conjunctivae/corneas clear. Neck: Supple, with full range of motion. No jugular venous distention. Trachea midline. Respiratory:  Normal respiratory effort. Clear to auscultation, bilaterally without Rales/Wheezes/Rhonchi. Cardiovascular: Regular rate and rhythm with normal S1/S2 without murmurs, rubs or gallops. Abdomen: Soft, non-tender, non-distended with normal bowel sounds. Musculoskeletal: No clubbing, cyanosis, trace edema bilaterally in the lower extremities, compression wrapping around the left lower extremity. No tenderness elicited  Skin: Skin color, texture, turgor normal.  No rashes or lesions. Neurologic: No focal deficits  Psychiatric: Alert and oriented, thought content appropriate, normal insight    Labs:   Recent Labs     03/12/23  1939 03/13/23  0456 03/13/23  0819   WBC 9.4 7.8 8.9   HGB 13.6 14.1 14.2   HCT 41.8 42.9 44.2    185 173     Recent Labs     03/12/23  1545 03/13/23  0456    142   K 4.2 3.7    105   CO2 24 22   BUN 51* 48*   CREATININE 2.8* 2.5*   CALCIUM 9.4 9.2     Recent Labs     03/12/23  1545 03/13/23  0456   AST 21 17   ALT 24 21   BILITOT 0.4 0.5   ALKPHOS 73 70     No results for input(s): INR in the last 72 hours. No results for input(s): Dg Pineda in the last 72 hours.     Assessment/Plan:    Active Hospital Problems    Diagnosis Date Noted    DVT, lower extremity, distal, acute, left ([facility] [address] [I82.4Z2] 03/12/2023     Priority: Medium   -Left lower extremity DVT  -Supratherapeutic PTT  -Large hematoma left lower extremity  -Ambulatory dysfunction  -CKD stage IV  -Hypertension  -Obesity class III        PLAN:  -Continue to monitor on telemetry  -Consult vascular surgery-okay to continue heparin drip without a bolus dose, continue compression wrapping  -Heparin infusion-maintenance dose  -PT/OT  -Continue home medications  -CM/SW for possible placement      DVT Prophylaxis: Heparin drip  Diet: ADULT DIET;  Regular  Code Status: Full Code    PT/OT Eval Status: Consulted    Dispo -on heparin drip per vascular surgery, compression wrapping, would possibly require placement versus SNF due to recurrent falls    Fidelia Cedeno MD.

## 2023-03-13 NOTE — CARE COORDINATION
Social Work/Case Management Transition of Care Planning ([place] [phone]): Patient presented to the hospital with bilateral lower extremity pain and weakness and difficulty with ambulation. Vascular surgery was consulted. Patient was found to have a DVT in LLE. He is currently on a heparin drip. Met with patient at bedside. He resides in a single story home with 2 AILYN. He lives with his wife, Payam Todd. Patient reports he has been on disability since 2008. He indicated he is independent with all aspects of care. No oxygen needs at home. He does have 2 single point canes. PCP is Dr. Sonia Schmitt. [facility]. Discharge plan is to return to home with no needs. Wife will transport. If SHON is  needed, patient has choiced 1. [facility] [id number]  2. [facility] [place]. Await therapy evals. CM/SW will follow. SHERICE Porter  3/13/2023      Case Management Assessment  Initial Evaluation    Date/Time of Evaluation: 3/13/2023 3:50 PM  Assessment Completed by: SHERICE Porter    If patient is discharged prior to next notation, then this note serves as note for discharge by case management. Patient Name: Dinah Valles                   YOB: 1955  Diagnosis: DVT, lower extremity, distal, acute, left ([address] [I82.4Z2]                   Date / Time: 3/12/2023  2:55 PM    Patient Admission Status: Inpatient   Readmission Risk (Low < 19, Mod (19-27), High > 27): Readmission Risk Score: 12.6    Current PCP: Augie Sparks MD  PCP verified by CM? Yes (Dr. Sonia Schmitt)    Chart Reviewed:       History Provided by: Patient  Patient Orientation: Alert and Oriented, Person, Place, Situation, Self    Patient Cognition: Alert    Hospitalization in the last 30 days (Readmission): If yes, Readmission Assessment in  Navigator will be completed.     Advance Directives:      Code Status: Full Code   Patient's Primary Decision Maker is:        Discharge Planning:    Patient lives with:   Type of Home:    Primary Care Giver: Self  Patient Support Systems include:     Current Financial resources:    Current community resources:    Current services prior to admission:              Current DME:              Type of Home Care services:       ADLS  Prior functional level: Independent in ADLs/IADLs  Current functional level: Assistance with the following:, Bathing, [address], Cooking, Shopping, Mobility    PT AM-PAC:   /24  OT AM-PAC:   /24    Family can provide assistance at DC: Yes  Would you like Case Management to discuss the discharge plan with any other family members/significant others, and if so, who? No  Plans to Return to Present Housing: Yes  Other Identified Issues/Barriers to RETURNING to current housing:   Potential Assistance needed at discharge:              Potential DME:    Patient expects to discharge to:    Plan for transportation at discharge:      Financial    Payor: Kayleigh Horton / Plan: Usman 1978 / Product Type: *No Product type* /     Does insurance require precert for SNF:     Potential assistance Purchasing Medications:    Meds-to-Beds request: Yes      [address] [place] [address] [phone] [facility] [phone]  [address]  Phone: [phone] Fax: [address] [phone] - f [phone]  [address]  [address] [place] [address]  Phone: [phone] Fax: [phone]      Notes:    Factors facilitating achievement of predicted outcomes:     Barriers to discharge: Additional Case Management Notes: The Plan for Transition of Care is related to the following treatment goals of DVT, lower extremity, distal, acute, left ([facility] [address] [G77.8A0]    IF APPLICABLE: The Patient and/or patient representative Karsten Carney and his family were provided with a choice of provider and agrees with the discharge plan.  Freedom of choice list with basic dialogue that supports the patient's individualized plan of care/goals and shares the quality data associated with the providers was provided to:     Patient Representative Name:       The Patient and/or Patient Representative Agree with the Discharge Plan      Timothy Montilla [place]  Case Management Department  Ph: [phone]

## 2023-03-14 LAB
ANION GAP SERPL CALCULATED.3IONS-SCNC: 11 MMOL/L (ref 7–16)
APTT: 76.4 SEC (ref 24.5–35.1)
APTT: 88.1 SEC (ref 24.5–35.1)
APTT: 90.6 SEC (ref 24.5–35.1)
APTT: 98.9 SEC (ref 24.5–35.1)
BASOPHILS ABSOLUTE: 0.06 E9/L (ref 0–0.2)
BASOPHILS RELATIVE PERCENT: 0.6 % (ref 0–2)
BUN BLDV-MCNC: 57 MG/DL (ref 6–23)
CALCIUM SERPL-MCNC: 9.1 MG/DL (ref 8.6–10.2)
CHLORIDE BLD-SCNC: 104 MMOL/L (ref 98–107)
CO2: 27 MMOL/L (ref 22–29)
CREAT SERPL-MCNC: 2.8 MG/DL (ref 0.7–1.2)
EOSINOPHILS ABSOLUTE: 0.08 E9/L (ref 0.05–0.5)
EOSINOPHILS RELATIVE PERCENT: 0.8 % (ref 0–6)
GFR SERPL CREATININE-BSD FRML MDRD: 24 ML/MIN/1.73
GLUCOSE BLD-MCNC: 122 MG/DL (ref 74–99)
HCT VFR BLD CALC: 40.2 % (ref 37–54)
HEMOGLOBIN: 12.7 G/DL (ref 12.5–16.5)
IMMATURE GRANULOCYTES #: 0.05 E9/L
IMMATURE GRANULOCYTES %: 0.5 % (ref 0–5)
LYMPHOCYTES ABSOLUTE: 1.86 E9/L (ref 1.5–4)
LYMPHOCYTES RELATIVE PERCENT: 17.7 % (ref 20–42)
MCH RBC QN AUTO: 28.7 PG (ref 26–35)
MCHC RBC AUTO-ENTMCNC: 31.6 % (ref 32–34.5)
MCV RBC AUTO: 90.7 FL (ref 80–99.9)
MONOCYTES ABSOLUTE: 0.65 E9/L (ref 0.1–0.95)
MONOCYTES RELATIVE PERCENT: 6.2 % (ref 2–12)
NEUTROPHILS ABSOLUTE: 7.8 E9/L (ref 1.8–7.3)
NEUTROPHILS RELATIVE PERCENT: 74.2 % (ref 43–80)
PDW BLD-RTO: 15.3 FL (ref 11.5–15)
PLATELET # BLD: 170 E9/L (ref 130–450)
PMV BLD AUTO: 11.3 FL (ref 7–12)
POTASSIUM REFLEX MAGNESIUM: 3.6 MMOL/L (ref 3.5–5)
RBC # BLD: 4.43 E12/L (ref 3.8–5.8)
SODIUM BLD-SCNC: 142 MMOL/L (ref 132–146)
URINE CULTURE, ROUTINE: NORMAL
WBC # BLD: 10.5 E9/L (ref 4.5–11.5)

## 2023-03-14 PROCEDURE — 97530 THERAPEUTIC ACTIVITIES: CPT

## 2023-03-14 PROCEDURE — 99232 SBSQ HOSP IP/OBS MODERATE 35: CPT | Performed by: PHYSICIAN ASSISTANT

## 2023-03-14 PROCEDURE — 97166 OT EVAL MOD COMPLEX 45 MIN: CPT

## 2023-03-14 PROCEDURE — 97161 PT EVAL LOW COMPLEX 20 MIN: CPT

## 2023-03-14 PROCEDURE — 80048 BASIC METABOLIC PNL TOTAL CA: CPT

## 2023-03-14 PROCEDURE — 6360000002 HC RX W HCPCS: Performed by: FAMILY MEDICINE

## 2023-03-14 PROCEDURE — 36415 COLL VENOUS BLD VENIPUNCTURE: CPT

## 2023-03-14 PROCEDURE — 85730 THROMBOPLASTIN TIME PARTIAL: CPT

## 2023-03-14 PROCEDURE — 6370000000 HC RX 637 (ALT 250 FOR IP): Performed by: FAMILY MEDICINE

## 2023-03-14 PROCEDURE — 1200000000 HC SEMI PRIVATE

## 2023-03-14 PROCEDURE — 76937 US GUIDE VASCULAR ACCESS: CPT

## 2023-03-14 PROCEDURE — 2580000003 HC RX 258: Performed by: FAMILY MEDICINE

## 2023-03-14 PROCEDURE — 85025 COMPLETE CBC W/AUTO DIFF WBC: CPT

## 2023-03-14 RX ADMIN — AMLODIPINE BESYLATE 10 MG: 10 TABLET ORAL at 17:57

## 2023-03-14 RX ADMIN — DICYCLOMINE HYDROCHLORIDE 40 MG: 10 CAPSULE ORAL at 08:51

## 2023-03-14 RX ADMIN — DICYCLOMINE HYDROCHLORIDE 40 MG: 10 CAPSULE ORAL at 20:42

## 2023-03-14 RX ADMIN — SODIUM CHLORIDE, PRESERVATIVE FREE 10 ML: 5 INJECTION INTRAVENOUS at 20:43

## 2023-03-14 RX ADMIN — PROPRANOLOL HYDROCHLORIDE 40 MG: 20 TABLET ORAL at 17:57

## 2023-03-14 RX ADMIN — Medication 2000 UNITS: at 17:57

## 2023-03-14 RX ADMIN — LISINOPRIL 40 MG: 10 TABLET ORAL at 08:48

## 2023-03-14 RX ADMIN — HEPARIN SODIUM 15 UNITS/KG/HR: 10000 INJECTION, SOLUTION INTRAVENOUS at 12:27

## 2023-03-14 RX ADMIN — DOCUSATE SODIUM 100 MG: 100 CAPSULE, LIQUID FILLED ORAL at 17:57

## 2023-03-14 RX ADMIN — HYDROCHLOROTHIAZIDE 25 MG: 25 TABLET ORAL at 18:01

## 2023-03-14 RX ADMIN — HEPARIN SODIUM 17 UNITS/KG/HR: 10000 INJECTION, SOLUTION INTRAVENOUS at 08:59

## 2023-03-14 RX ADMIN — DICYCLOMINE HYDROCHLORIDE 40 MG: 10 CAPSULE ORAL at 14:23

## 2023-03-14 RX ADMIN — TOPIRAMATE 25 MG: 25 TABLET, FILM COATED ORAL at 17:57

## 2023-03-14 RX ADMIN — POTASSIUM CHLORIDE 20 MEQ: 1500 TABLET, EXTENDED RELEASE ORAL at 18:01

## 2023-03-14 RX ADMIN — ALLOPURINOL 100 MG: 100 TABLET ORAL at 08:51

## 2023-03-14 RX ADMIN — HEPARIN SODIUM 16.97 UNITS/KG/HR: 10000 INJECTION, SOLUTION INTRAVENOUS at 05:53

## 2023-03-14 ASSESSMENT — PAIN SCALES - GENERAL
PAINLEVEL_OUTOF10: 0
PAINLEVEL_OUTOF10: 4

## 2023-03-14 ASSESSMENT — PAIN DESCRIPTION - ORIENTATION: ORIENTATION: RIGHT;LEFT

## 2023-03-14 ASSESSMENT — PAIN DESCRIPTION - DESCRIPTORS: DESCRIPTORS: ACHING;CRAMPING;DISCOMFORT

## 2023-03-14 ASSESSMENT — PAIN DESCRIPTION - LOCATION: LOCATION: FOOT

## 2023-03-14 NOTE — PROGRESS NOTES
OCCUPATIONAL THERAPY INITIAL EVALUATION    [address] [address] [place]      Date:3/14/2023                                                  Patient Name: Joanie Dandy  MRN: [id number]  : 1955  Room: [address] [place] [address]    Evaluating OT: Mikel Spatz, MOT, OTR/L [id number]  Referring Vesta Roach DO  Specific Provider Orders: OT eval and treat 3/12  Recommended Adaptive Equipment: 24 hr assist     Diagnosis: LLE DVT   Surgery: none per vascular   Pertinent Medical History: HTN, CKD, lymphadenopathy   Precautions:  Fall Risk, WBAT LLE (per bharti Andrade vascular resident)    Assessment of current deficits   [x] Functional mobility  [x]ADLs  [x] Strength               [x]Cognition   [x] Functional transfers   [x] IADLs         [x] Safety Awareness   [x]Endurance   [] Fine Coordination              [x] Balance      [] Vision/perception   [x]Sensation    []Gross Motor Coordination  [] ROM  [] Delirium                   [] Motor Control     OT PLAN OF CARE   OT POC based on physician orders, patient diagnosis and results of clinical assessment    Frequency/Duration: 2-4 days/wk for 2 weeks PRN   Specific OT Treatment to include:   * Instruction/training on adapted ADL techniques and AE recommendations to increase functional independence within precautions       * Training on energy conservation strategies, correct breathing pattern and techniques to improve independence/tolerance for self-care routine  * Functional transfer/mobility training/DME recommendations for increased independence, safety, and fall prevention  * Patient/Family education to increase follow through with safety techniques and functional independence  * Recommendation of environmental modifications for increased safety with functional transfers/mobility and ADLs  * Therapeutic exercise to improve motor endurance, ROM, and functional strength for ADLs/functional transfers  * Therapeutic activities to facilitate/challenge dynamic balance, stand tolerance for increased safety and independence with ADLs  * Neuro-muscular re-education: facilitation of righting/equilibrium reactions, midline orientation, scapular stability/mobility, normalization of muscle tone, and facilitation of volitional active controled movement    Home Living: Pt lives with wife in a 1 story home; bed/bath on main level   Bathroom setup: tub shower unit   Equipment owned: reacher  Prior Level of Function: assist with ADLs/IADLs; using cane for functional mobility     Pain Level: LLE  Cognition: A&O: 3/4; Follows 1 step directions, with repetition and increased time   Memory:  good    Sequencing:  good    Problem solving:  fair    Judgement/safety:  fair     Functional Assessment:  AM-PAC Daily Activity Raw Score: 13/24   Initial Eval Status  Date: 3/14/23 Treatment Status  Date: STGs=LTGs  Time Frame: 10-14 days   Feeding SUP   Set-up   Grooming SBA (seated EOB)   Set-up   UB Dressing SBA   Set-up   LB Dressing Dep (assist with socks)  Max A    Bathing Max A (simulated)  Mod A    Toileting dep  Max A   Bed Mobility  Log roll: Min A  Supine to sit: Min A   Sit to supine: Min A   Log roll SBA  Supine to sit: SBA   Sit to supine: SBA   Functional Transfers Sit to stand:attempted, unable to complete safely with 1 assist   Stand to sit:nt  Commode: nt  Mod A   Functional Mobility NT  Max A   Balance Sitting: sba  Standing: nt     Activity Tolerance Fair-     Visual/  Perceptual Glasses: yes              UE ROM: RUE:  WFL  LUE:  WFL  Strength: RUE: grossly 4/5 LUE: grossly 4/5   Strength: B WFL  Fine Motor Coordination:  WFL     Hearing: WFL  Sensation:  No c/o numbness/tingling   Tone:  WFL  Edema: BLEs                            Comments:Cleared by RN to see pt. Upon arrival, patient supine in bed and agreeable to OT session. Wife present.  At end of session, patient supine in bed with call light and phone within reach, all lines and tubes intact. Pt would benefit from continued OT to increase functional independence and quality of life. Treatment:  Pt required vc's and physical assist for proper technique/safety with hand placement/body mechanics/posture for bed mobility/ADLs/functional tranfers. Pt required vc's for sequencing/initiation of ADLs/functional transfers. Pt educated on WB status. Pt instructed on use of call light for assistance and fall prevention. Pt demo'ing fair understanding of education provided. Continue to educate. Eval Complexity: moderate  History: Expanded chart review of medical records and additional review of physical, cognitive, or psychosocial history related to current functional performance  Exam: 3+ performance deficits  Assistance/Modification: Min/mod assistance or modifications required to perform tasks. May have comorbidities that affect occupational performance. Rehab Potential: Good for established goals, pt. assisted in establishment of goals. LTG: maximize independence with ADLs to return to PLOF    Patient and/or family were instructed on diagnosis, prognosis/goals and plan of care. Demonstrated fair understanding. [] Malnutrition indicators have been identified and nursing has been notified to ensure a dietitian consult is ordered. Evaluation time includes thorough review of current medical information, gathering information on past medical & social history & PLOF, completion of standardized testing, informal observation of tasks, consultation with other medical professions/disciplines, assessment of data & development of POC/goals.      Time In: 4:30       Time Out: 4:45     Total treatment time: 0       Treatment Charges: Mins Units   OT Eval Low 44139     OT Eval Medium 59078 X    OT Eval High 72880     OT Re-Eval [id number]     Ther Ex  80330       Manual Therapy [id number]       Thera Activities 26286       ADL/Home Mgt 34811     Neuro Re-ed 79364       Group Therapy        Orthotic manage/training  13866       Non-Billable Time           Particia August, OTR/L [id number]

## 2023-03-14 NOTE — PROGRESS NOTES
Physical Therapy  Physical Therapy Initial Assessment     Name: Peace Silva  : 1955  MRN: [id number]      Date of Service: 3/14/2023    Evaluating PT:  Lauryn Sanchez PT, DPT, [id number]    Room #:  4981/6441-V  Diagnosis:  DVT, lower extremity, distal, acute, left ([facility] [address] [I82.4Z2]  PMHx/PSHx:    Past Medical History:   Diagnosis Date    A-V fistula ([facility] [address]     left arm    Arthritis     Back pain 2019    had LESI    CKD (chronic kidney disease)     stage 4    HTN (hypertension)     Keratoconus     bilateral    Kidney disease     Lymphadenopathy     lower legs    Migraine     Prolonged emergence from general anesthesia       Past Surgical History:   Procedure Laterality Date    COLONOSCOPY      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    DIALYSIS FISTULA CREATION Left 2021    CREATION AV FISTULA LEFT ARM performed by Lila Alvarado MD at [address] [facility] Left 2021    REVISION ARTERIOVENOUS FISTULA LEFT ARM performed by Lila Alvarado MD at [address] [facility]  2002    lap tricia    UPPER GASTROINTESTINAL ENDOSCOPY N/A 2019    EGD FOREIGN BODY REMOVAL performed by Tahmina Solitario MD at [address] [facility] [address]      Procedure/Surgery:  none this admission  Precautions:  Fall Risk, NWB LLE (per RN), Bed Alarm, IV, LLE elevated prn, Hx Ankylosing Spondylitis   Equipment Needs:  TBD    SUBJECTIVE:    Pt lives with wife in a 1 story home with 3 stairs to enter and 0 rail. Bed is on 1 floor and bath is on 1 floor. Pt has basement with 10 steps and no rail to access. Pt ambulated with two canes PTA. OBJECTIVE:   Initial Evaluation  Date: 3/14/23 Treatment Short Term/ Long Term   Goals   AM-PAC 6 Clicks 36/91     Was pt agreeable to Eval/treatment? yes     Does pt have pain? Pain in R foot digits 3-5      Bed Mobility  Rolling: NT  Supine to sit: Charli  Sit to supine: Charli  Scooting: NT  Rolling: Independent   Supine to sit:  Independent   Sit to supine: Independent Scooting: Independent    Transfers Sit to stand: NT  Stand to sit: NT  Stand pivot: NT  Sit to stand: Independent   Stand to sit: Independent   Stand pivot: mod Independent with AD   Ambulation   NT  >150 feet with AD mod Independent    Stair negotiation: ascended and descended NT  10+ steps with 0 rail Independent    ROM BUE:  Refer to OT note  BLE:  WFL     Strength BUE:  Refer to OT note  RLE: 5/5     Balance Sitting EOB:  Sup  Dynamic Standing:  NT  Sitting EOB:  Independent   Dynamic Standing:  mod Independent with AD     Pt is A & O x 4  Sensation:  Pt reports numbness to bottom of R foot  Edema:  BLE    Therapeutic Exercises:    RLE:  Ankle Pumps  Heel slides  Quad Sets  Glute Sets    Patient education  Pt educated on role of PT and safety with functional mobility    Patient response to education:   Pt verbalized understanding Pt demonstrated skill Pt requires further education in this area   x x x     ASSESSMENT:    Conditions Requiring Skilled Therapeutic Intervention:    []Decreased strength     []Decreased ROM  [x]Decreased functional mobility  [x]Decreased balance   [x]Decreased endurance   []Decreased posture  [x]Decreased sensation  []Decreased coordination   []Decreased vision  []Decreased safety awareness   [x]Increased pain       Comments:  Medically cleared for session by RN. Pt was in bed upon PT entry and agreeable to participate. Pt was educated on NWB LLE precautions at beginning of session. Pt transferred supine<>sit with assistance fore Pavel. Once sitting EOB pt was able to maintain good sitting balance while maintaining precautions. While sitting EOB pt was educated on seated/supine exercises that can be completed throughout the day on the RLE. Sit<>stand transfer deferred at this time d/t safety. Pt was returned to supine and positioned in chair position with LLE elevated and all needs met and call light in reach at conclusion of session. RN notified.     Treatment:  Patient practiced and was instructed in the following treatment:    Bed mobility training - pt given verbal and tactile cues to facilitate proper sequencing and safety during rolling and supine>sit as well as provided with physical assistance to complete task   Sitting EOB for >20 minutes for upright tolerance, postural awareness and BLE ROM  Skilled positioning - Pt placed in the chair position with pillows utilized to facilitate upright posture, joint and skin integrity, and interaction with environment. TherEx: as noted above for edema reduction, maintaining joint integrity, and strengthening    Pt's/ family goals   1. To return to PLOF    Prognosis is fair for reaching above PT goals. Patient and or family understand(s) diagnosis, prognosis, and plan of care.   yes    PHYSICAL THERAPY PLAN OF CARE:    PT POC is established based on physician order and patient diagnosis     Referring provider/PT Order:    03/12/23 2030  PT eval and treat  Start:  03/12/23 2030,   End:  03/12/23 2030,   ONE TIME,   Standing Count:  1 Occurrences,   R         Merary Aquino,      Diagnosis:  DVT, lower extremity, distal, acute, left ([facility] [address] [I82.4Z2]  Specific instructions for next treatment:  improve safety and independence with functional mobility while maintaining precautions    Current Treatment Recommendations:     [x] Strengthening to improve independence with functional mobility   [x] ROM to improve independence with functional mobility   [x] Balance Training to improve static/dynamic balance and to reduce fall risk  [x] Endurance Training to improve activity tolerance during functional mobility   [x] Transfer Training to improve safety and independence with all functional transfers   [] Gait Training to improve gait mechanics, endurance and assess need for appropriate assistive device  [] Stair Training in preparation for safe discharge home and/or into the community   [x] Positioning to prevent skin breakdown and contractures  [x] Safety and Education Training   [x] Patient/Caregiver Education   [] HEP  [x] Other     PT long term treatment goals are located in above grid    Frequency of treatments: 2-5x/week x 1-2 weeks. Time in  1435  Time out  1505    Total Treatment Time  15 minutes     Evaluation Time includes thorough review of current medical information, gathering information on past medical history/social history and prior level of function, completion of standardized testing/informal observation of tasks, assessment of data and education on plan of care and goals.     CPT codes:  [x] Low Complexity PT evaluation 71291  [] Moderate Complexity PT evaluation 58295  [] High Complexity PT evaluation 67149  [] PT Re-evaluation 99795  [] Gait training 85466 0 minutes  [] Manual therapy 18399 0 minutes  [x] Therapeutic activities 88863 15 minutes  [] Therapeutic exercises 73227 0 minutes  [] Neuromuscular reeducation 07004 0 minutes     Alfred Conway PT, DPT  [id number]

## 2023-03-14 NOTE — PROGRESS NOTES
VASCULAR SURGERY PROGRESS NOTE    Pt is being seen in f/u today regarding LLE hematoma following trauma     SUBJECTIVE  Pt seen/examined. Restarted out heparin without further bleeding, minimal pain in calf, no weakness or numbness. Hematoma is stable in size and softer than yesterday. Afebrile, hemodynamically stable   Hb 14 yesterday       CURRENT MEDICATIONS    heparin (PORCINE) Infusion 18.607 Units/kg/hr (03/13/23 2251)    sodium chloride        heparin (porcine), heparin (porcine), gabapentin, sodium chloride flush, sodium chloride, promethazine **OR** ondansetron, polyethylene glycol, acetaminophen **OR** acetaminophen    colchicine  0.6 mg Oral QPM    hydroCHLOROthiazide  25 mg Oral QPM    propranolol  40 mg Oral QPM    topiramate  25 mg Oral QPM    allopurinol  100 mg Oral Daily    amLODIPine  10 mg Oral QPM    lisinopril  40 mg Oral Daily    vitamin D  2,000 Units Oral QPM    dicyclomine  40 mg Oral TID    docusate sodium  100 mg Oral QPM    potassium chloride  20 mEq Oral QPM    sodium chloride flush  10 mL IntraVENous 2 times per day        PHYSICAL EXAM   /65   Pulse 58   Temp 98.4 °F (36.9 °C) (Temporal)   Resp 18   Ht 5' 7\" (1.702 m)   Wt 269 lb (122 kg)   SpO2 100%   BMI 42.13 kg/m²     Intake/Output Summary (Last 24 hours) at 3/14/2023 0524  Last data filed at 3/13/2023 1409  Gross per 24 hour   Intake --   Output 350 ml   Net -350 ml          GENERAL:  NAD. A&Ox3. LUNGS:  No increased work of breathing. CARDIOVASCULAR: RR  ABDOMEN:  Soft, non-distended, non-tender. No guarding, rigidity, rebound. EXTREMITIES:   MAEx4. Atraumatic. No LE edema.     LABS    Lab Results   Component Value Date    WBC 8.9 03/13/2023    HGB 14.2 03/13/2023    HCT 44.2 03/13/2023     03/13/2023    PROTIME 11.0 04/28/2021    INR 1.0 04/28/2021    APTT 98.9 (H) 03/14/2023    K 3.7 03/13/2023    BUN 48 (H) 03/13/2023    CREATININE 2.5 (H) 03/13/2023       ASSESSMENT/PLAN  ASSESSMENT/PLAN:  Soft tissue hematoma in the setting of trauma      Ok to leave dressing off  [id number] Anahy Steinberg for diet   Ok to continue heparin drip   Monitor H/H daily  No plans for vascular surgery intervention, please reach out should questions or concerns arise.        Jack Gusman MD  Surgery Resident PGY-2  3/14/2023  5:24 AM

## 2023-03-14 NOTE — PROGRESS NOTES
Hospitalist Progress Note      PCP: Diego Higgins MD    Date of Admission: 3/12/2023    Chief Complaint: Expanding LLE hematoma , LLE DVT    Hospital Course:    Cecelia Cm is a 74 y.o. male with hx of CKD with left AVF in place that has not yet been used who presented with leg pain and difficulty ambulating and was found to have DVTs. He was placed on a heparin gtt here and shortly after noted an expanding hematoma over the left shin with no external bleeding. The heparin gtt is now stopped. He has no history of DVTs prior to this episode, no family history of DVTs or coagulation disorders. He does state that just prior to coming to the hospital he fell and believes he may of landed on his leg.     Subjective: Patient was seen at bedside this morning, discussed about the plan regarding continuing hep drip, monitoring hb, await PT/OT eval    Medications:  Reviewed    Infusion Medications    heparin (PORCINE) Infusion 17 Units/kg/hr (03/14/23 0859)    sodium chloride       Scheduled Medications    colchicine  0.6 mg Oral QPM    hydroCHLOROthiazide  25 mg Oral QPM    propranolol  40 mg Oral QPM    topiramate  25 mg Oral QPM    allopurinol  100 mg Oral Daily    amLODIPine  10 mg Oral QPM    lisinopril  40 mg Oral Daily    vitamin D  2,000 Units Oral QPM    dicyclomine  40 mg Oral TID    docusate sodium  100 mg Oral QPM    potassium chloride  20 mEq Oral QPM    sodium chloride flush  10 mL IntraVENous 2 times per day     PRN Meds: heparin (porcine), heparin (porcine), gabapentin, sodium chloride flush, sodium chloride, promethazine **OR** ondansetron, polyethylene glycol, acetaminophen **OR** acetaminophen      Intake/Output Summary (Last 24 hours) at 3/14/2023 1002  Last data filed at 3/14/2023 0848  Gross per 24 hour   Intake --   Output 1000 ml   Net -1000 ml       Exam:    /70   Pulse 58   Temp 97.8 °F (36.6 °C) (Temporal)   Resp 18   Ht 5' 7\" (1.702 m)   Wt 269 lb (122 kg)   SpO2 95% BMI 42.13 kg/m²     General appearance: Morbidly obese, appears stated age and cooperative. HEENT: Pupils equal, round, and reactive to light. Conjunctivae/corneas clear. Neck: Supple, with full range of motion. No jugular venous distention. Trachea midline. Respiratory:  Normal respiratory effort. Clear to auscultation, bilaterally without Rales/Wheezes/Rhonchi. Cardiovascular: Regular rate and rhythm with normal S1/S2 without murmurs, rubs or gallops. Abdomen: Soft, non-tender, non-distended with normal bowel sounds. Musculoskeletal: No clubbing, cyanosis, trace edema bilaterally in the lower extremities, LLE shows swelling on the shin-bluish discoloration, no tenderness, sensations intact, no warmth. No tenderness elicited  Skin: Skin color, texture, turgor normal.  No rashes or lesions. Neurologic: No focal deficits  Psychiatric: Alert and oriented, thought content appropriate, normal insight    Labs:   Recent Labs     03/12/23  1939 03/13/23  0456 03/13/23  0819   WBC 9.4 7.8 8.9   HGB 13.6 14.1 14.2   HCT 41.8 42.9 44.2    185 173     Recent Labs     03/12/23  1545 03/13/23  0456    142   K 4.2 3.7    105   CO2 24 22   BUN 51* 48*   CREATININE 2.8* 2.5*   CALCIUM 9.4 9.2     Recent Labs     03/12/23  1545 03/13/23  0456   AST 21 17   ALT 24 21   BILITOT 0.4 0.5   ALKPHOS 73 70     No results for input(s): INR in the last 72 hours. No results for input(s): Rylee Nasuti in the last 72 hours.     Assessment/Plan:    Active Hospital Problems    Diagnosis Date Noted    Acute deep vein thrombosis (DVT) of femoral vein of left lower extremity (HCC) [I82.412] 03/13/2023     Priority: Medium    DVT, lower extremity, distal, acute, left ([facility] [address] [I82.4Z2] 03/12/2023     Priority: Medium   -Left lower extremity DVT  -Supratherapeutic PTT  -Large hematoma left lower extremity  -Ambulatory dysfunction  -CKD stage IV  -Hypertension  -Obesity class III        PLAN:  -Continue to monitor on telemetry  -Consult vascular surgery-okay to continue heparin drip without a bolus dose, continue compression wrapping  -Heparin infusion-maintenance dose  - monitor hb-await labs this AM, hb yesterday 14.1--->14.2  -PT/OT consulted  -Continue home medications  -CM/SW     DVT Prophylaxis: Heparin drip  Diet: ADULT DIET;  Regular  Code Status: Full Code    PT/OT Eval Status: Consulted    Dispo -on heparin drip, compression wrapping, PT/OT consulted- await eval, possible DC in the next 24hrs after switching to Emerson Tavarez MD.

## 2023-03-14 NOTE — CARE COORDINATION
03/14/23 Update CM Note: Patient remains on general medical floor. IV heparin is continued. Let is wrapped with ace/elevated. PT.OT is pending. Plan is to switch to Eliquis when ok with vascular. Per notes discharge within the next 24hrs.  Electronically signed by Nelson Borja RN CM on 3/14/2023 at 3:09 PM

## 2023-03-14 NOTE — PROGRESS NOTES
VASCULAR SURGERY PROGRESS NOTE    Pt is being seen in f/u today regarding LLE hematoma following trauma     SUBJECTIVE  Pt seen/examined. Resting comfortably in bed. Denies much pain, but states there is a weird, heaviness sensation to the left lateral leg where the hematoma is. Heparin has been resumed without apparent issues. CURRENT MEDICATIONS    heparin (PORCINE) Infusion 17 Units/kg/hr (03/14/23 0859)    sodium chloride        heparin (porcine), heparin (porcine), gabapentin, sodium chloride flush, sodium chloride, promethazine **OR** ondansetron, polyethylene glycol, acetaminophen **OR** acetaminophen    colchicine  0.6 mg Oral QPM    hydroCHLOROthiazide  25 mg Oral QPM    propranolol  40 mg Oral QPM    topiramate  25 mg Oral QPM    allopurinol  100 mg Oral Daily    amLODIPine  10 mg Oral QPM    lisinopril  40 mg Oral Daily    vitamin D  2,000 Units Oral QPM    dicyclomine  40 mg Oral TID    docusate sodium  100 mg Oral QPM    potassium chloride  20 mEq Oral QPM    sodium chloride flush  10 mL IntraVENous 2 times per day        PHYSICAL EXAM   /70   Pulse 58   Temp 97.8 °F (36.6 °C) (Temporal)   Resp 18   Ht 5' 7\" (1.702 m)   Wt 269 lb (122 kg)   SpO2 95%   BMI 42.13 kg/m²     Intake/Output Summary (Last 24 hours) at 3/14/2023 0909  Last data filed at 3/14/2023 0848  Gross per 24 hour   Intake --   Output 1000 ml   Net -1000 ml          GENERAL:  NAD. A&Ox3. LUNGS:  No increased work of breathing. CARDIOVASCULAR: RR  ABDOMEN:  Soft, non-distended, non-tender. No guarding, rigidity, rebound. LUE: well dilated AVF with thrill. 2+ radial pulse  RLE: slight edema. 3+ DP pulse  LLE: Mild edema to the entire leg. Localized hematoma to the lateral lower leg with ecchymosis. Soft and mobile. Mild ttp.  3+ DP pulse    LABS    Lab Results   Component Value Date    WBC 8.9 03/13/2023    HGB 14.2 03/13/2023    HCT 44.2 03/13/2023     03/13/2023    PROTIME 11.0 04/28/2021    INR 1.0 04/28/2021 APTT 98.9 (H) 03/14/2023    K 3.7 03/13/2023    BUN 48 (H) 03/13/2023    CREATININE 2.5 (H) 03/13/2023     ASSESSMENT/PLAN:  Soft tissue hematoma in the setting of trauma   Encounter regarding vascular access for end stage renal disease    Due to patient's symptoms of heaviness and discomfort, I recommend replacing ACE wrap and continue elevating as needed. Continue heparin gtt and transition to [facility] when able per primary team. Recommend 6 months treatment with [facility] for his DVT. I will arrange for patient to FU in 6 months to assess his LUE AVF or sooner should he require dialysis. Vascular will sign off, please call with any other issues.     Wicho Deleon PA-C

## 2023-03-15 ENCOUNTER — TELEPHONE (OUTPATIENT)
Dept: VASCULAR SURGERY | Age: 68
End: 2023-03-15

## 2023-03-15 VITALS
HEART RATE: 65 BPM | DIASTOLIC BLOOD PRESSURE: 59 MMHG | BODY MASS INDEX: 42.22 KG/M2 | OXYGEN SATURATION: 97 % | WEIGHT: 269 LBS | RESPIRATION RATE: 18 BRPM | TEMPERATURE: 97.8 F | HEIGHT: 67 IN | SYSTOLIC BLOOD PRESSURE: 103 MMHG

## 2023-03-15 LAB
ANION GAP SERPL CALCULATED.3IONS-SCNC: 12 MMOL/L (ref 7–16)
APTT BLD: 144.6 SEC (ref 24.5–35.1)
APTT BLD: 144.9 SEC (ref 24.5–35.1)
APTT: 77.4 SEC (ref 24.5–35.1)
BASOPHILS ABSOLUTE: 0.12 E9/L (ref 0–0.2)
BASOPHILS RELATIVE PERCENT: 1.3 % (ref 0–2)
BUN BLDV-MCNC: 66 MG/DL (ref 6–23)
CALCIUM SERPL-MCNC: 9.3 MG/DL (ref 8.6–10.2)
CHLORIDE BLD-SCNC: 103 MMOL/L (ref 98–107)
CO2: 20 MMOL/L (ref 22–29)
CREAT SERPL-MCNC: 2.9 MG/DL (ref 0.7–1.2)
EOSINOPHILS ABSOLUTE: 0.19 E9/L (ref 0.05–0.5)
EOSINOPHILS RELATIVE PERCENT: 2 % (ref 0–6)
GFR SERPL CREATININE-BSD FRML MDRD: 23 ML/MIN/1.73
GLUCOSE BLD-MCNC: 99 MG/DL (ref 74–99)
HCT VFR BLD CALC: 43.2 % (ref 37–54)
HEMOGLOBIN: 13.2 G/DL (ref 12.5–16.5)
IMMATURE GRANULOCYTES #: 0.06 E9/L
IMMATURE GRANULOCYTES %: 0.6 % (ref 0–5)
LYMPHOCYTES ABSOLUTE: 2.43 E9/L (ref 1.5–4)
LYMPHOCYTES RELATIVE PERCENT: 25.4 % (ref 20–42)
MCH RBC QN AUTO: 28.9 PG (ref 26–35)
MCHC RBC AUTO-ENTMCNC: 30.6 % (ref 32–34.5)
MCV RBC AUTO: 94.7 FL (ref 80–99.9)
MONOCYTES ABSOLUTE: 0.84 E9/L (ref 0.1–0.95)
MONOCYTES RELATIVE PERCENT: 8.8 % (ref 2–12)
NEUTROPHILS ABSOLUTE: 5.91 E9/L (ref 1.8–7.3)
NEUTROPHILS RELATIVE PERCENT: 61.9 % (ref 43–80)
PDW BLD-RTO: 15.6 FL (ref 11.5–15)
PLATELET # BLD: 179 E9/L (ref 130–450)
PMV BLD AUTO: 11.4 FL (ref 7–12)
POTASSIUM REFLEX MAGNESIUM: 4.6 MMOL/L (ref 3.5–5)
RBC # BLD: 4.56 E12/L (ref 3.8–5.8)
SODIUM BLD-SCNC: 135 MMOL/L (ref 132–146)
WBC # BLD: 9.6 E9/L (ref 4.5–11.5)

## 2023-03-15 PROCEDURE — 85025 COMPLETE CBC W/AUTO DIFF WBC: CPT

## 2023-03-15 PROCEDURE — 6370000000 HC RX 637 (ALT 250 FOR IP): Performed by: FAMILY MEDICINE

## 2023-03-15 PROCEDURE — 36415 COLL VENOUS BLD VENIPUNCTURE: CPT

## 2023-03-15 PROCEDURE — 80048 BASIC METABOLIC PNL TOTAL CA: CPT

## 2023-03-15 PROCEDURE — 85730 THROMBOPLASTIN TIME PARTIAL: CPT

## 2023-03-15 PROCEDURE — 97530 THERAPEUTIC ACTIVITIES: CPT

## 2023-03-15 RX ORDER — COLCHICINE 0.6 MG/1
0.6 TABLET ORAL EVERY EVENING
Qty: 30 TABLET | Refills: 0
Start: 2023-03-20

## 2023-03-15 RX ORDER — ALLOPURINOL 100 MG/1
100 TABLET ORAL DAILY
Qty: 30 TABLET | Refills: 3
Start: 2023-03-20

## 2023-03-15 RX ORDER — BENAZEPRIL HYDROCHLORIDE 40 MG/1
20 TABLET, FILM COATED ORAL DAILY
Qty: 30 TABLET | Refills: 0
Start: 2023-03-15 | End: 2023-05-14

## 2023-03-15 RX ADMIN — DICYCLOMINE HYDROCHLORIDE 40 MG: 10 CAPSULE ORAL at 14:33

## 2023-03-15 RX ADMIN — DICYCLOMINE HYDROCHLORIDE 40 MG: 10 CAPSULE ORAL at 09:32

## 2023-03-15 ASSESSMENT — PAIN DESCRIPTION - DESCRIPTORS
DESCRIPTORS: ACHING;DISCOMFORT
DESCRIPTORS: ACHING;DISCOMFORT;CRAMPING

## 2023-03-15 ASSESSMENT — PAIN DESCRIPTION - ONSET: ONSET: ON-GOING

## 2023-03-15 ASSESSMENT — PAIN SCALES - GENERAL
PAINLEVEL_OUTOF10: 3
PAINLEVEL_OUTOF10: 3

## 2023-03-15 ASSESSMENT — PAIN DESCRIPTION - LOCATION
LOCATION: FOOT;GROIN
LOCATION: FOOT;LEG

## 2023-03-15 ASSESSMENT — PAIN DESCRIPTION - FREQUENCY: FREQUENCY: CONTINUOUS

## 2023-03-15 ASSESSMENT — PAIN SCALES - WONG BAKER: WONGBAKER_NUMERICALRESPONSE: 2

## 2023-03-15 NOTE — PROGRESS NOTES
Physical Therapy  Physical Therapy Treatment    Name: Lili Lacy  : 1955  MRN: [id number]      Date of Service: 3/15/2023    Evaluating PT:  Carl Frank PT, DPT, [id number]    Room #:  7939/1341-C  Diagnosis:  DVT, lower extremity, distal, acute, left ([facility] [address] [I82.4Z2]  PMHx/PSHx:    Past Medical History:   Diagnosis Date    A-V fistula ([facility] [address]     left arm    Arthritis     Back pain 2019    had LESI    CKD (chronic kidney disease)     stage 4    HTN (hypertension)     Keratoconus     bilateral    Kidney disease     Lymphadenopathy     lower legs    Migraine     Prolonged emergence from general anesthesia       Past Surgical History:   Procedure Laterality Date    COLONOSCOPY      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    DIALYSIS FISTULA CREATION Left 2021    CREATION AV FISTULA LEFT ARM performed by Hima Kennedy MD at [address] Left 2021    REVISION ARTERIOVENOUS FISTULA LEFT ARM performed by Hima Kennedy MD at [address]  2002    lap tricia    UPPER GASTROINTESTINAL ENDOSCOPY N/A 2019    EGD FOREIGN BODY REMOVAL performed by Nikky Duran MD at [address]      Procedure/Surgery:  none this admission  Precautions:  Fall Risk, ),WBAT LLE (per OT note) Bed Alarm, IV, LLE elevated prn, Hx Ankylosing Spondylitis   Equipment Needs:  TBD    SUBJECTIVE:    Pt lives with wife in a 1 story home with 3 stairs to enter and 0 rail. Bed is on 1 floor and bath is on 1 floor. Pt has basement with 10 steps and no rail to access. Pt ambulated with two canes PTA. OBJECTIVE:   Initial Evaluation  Date: 3/14/23 Treatment  Date: 3/15/23 Short Term/ Long Term   Goals   AM-PAC 6 Clicks 97/68 73/91    Was pt agreeable to Eval/treatment? yes yes    Does pt have pain?  Pain in R foot digits 3-5  2-3/10 B feet and L groin pain    Bed Mobility  Rolling: NT  Supine to sit: Charli  Sit to supine: Charli  Scooting: NT Rolling: NT  Supine to sit: NT  Sit to supine: NT  Scooting: NT Rolling: Independent   Supine to sit: Independent   Sit to supine: Independent   Scooting: Independent    Transfers Sit to stand: NT  Stand to sit: NT  Stand pivot: NT Sit to stand: Charli  Stand to sit: Charli  Stand pivot: Charli with [employer] Sit to stand: Independent   Stand to sit: Independent   Stand pivot: mod Independent with AD   Ambulation   NT 22' x2 with [employer] Charli >150 feet with AD mod Independent    Stair negotiation: ascended and descended NT NT 10+ steps with 0 rail Independent    ROM BUE:  Refer to OT note  BLE:  WFL     Strength BUE:  Refer to OT note  RLE: 5/5     Balance Sitting EOB:  Sup  Dynamic Standing:  NT Sitting EOB: Independent   Dynamic Standing:  Charli with [employer] Sitting EOB:  Independent   Dynamic Standing:  mod Independent with AD   Pt is A & O x 4  Edema:  BLE    Patient education  Pt educated on importance on OOB mobility, WBAT LLE precautions, use of [employer] for stability, importance of supine/seated exercise for edema reduction, and proper technique and safety with all functional mobility    Patient response to education:   Pt verbalized understanding Pt demonstrated skill Pt requires further education in this area   x x x     ASSESSMENT:    Comments:  Pt was sitting EOB upon PT entry and agreeable to participate. Extensive education on proper technique and safety with all functional mobility and WBAT LLE precautions given to pt d/t apprehension. Pt was able to complete sit<>stand transfer with verbal cues for hand placement. Pt stood at EOB for ~5 minutes requiring encouragement to complete ambulation to bathroom. Ambulated to bathroom with [employer] with slowed pace and shortened step length. Pt was seated on commode for ~5 minutes. Instructed on proper use of grab bars to complete sit<>stand transfer. Assisted with antoinette care upon standing. Maintained dynamic standing balance at sin ~1-2 minutes for hand hygiene.  Ambulated back to EOB with all need met and call light in reach at conclusion of session. Treatment:  Patient practiced and was instructed in the following treatment:    Sitting EOB for >10 minutes for upright tolerance, postural awareness and BLE ROM  Transfer training - pt was given verbal and tactile cues to facilitate proper hand placement, technique and safety during sit to stand and stand to sit as well as provided with physical assistance. Gait training- pt was given verbal and tactile cues to facilitate [employer] approximation and safety during ambulation as well as provided with physical assistance. Education: Extensive education given on importance on OOB mobility, WBAT LLE precautions, use of WW for stability, importance of supine/seated exercise for edema reduction, and proper technique and safety with all functional mobility    PLAN:    Patient is making good progress towards established goals. Will continue with current POC.       Time in  1008  Time out  1051    Total Treatment Time  43 minutes     CPT codes:  [] Gait training 54490 0 minutes  [] Manual therapy 54093 0 minutes  [x] Therapeutic activities 58887 43 minutes  [] Therapeutic exercises 67146 0 minutes  [] Neuromuscular reeducation 2600 Reading 0 minutes    Wen Heart PT, DPT  [id number]

## 2023-03-15 NOTE — CARE COORDINATION
Social Work/Case Management Transition of Care Planning (Lexi Kerr [facility] [phone]): Per attending, heparin drip will be discontinued and patient will be transitioned to Eliquis. PT/OT scores noted to be 11 & 13. No ambulation was attempted on 3/14. Requested PT re-evaluate patient today. Updated score noted to be 17 and he was able to ambulate 25'x2 with FWW and Min assist.  Patient reports he has a walker at home. Discharge order noted. Met with patient at  bedside. Discharge plan is to return home with no needs. Wife to transport.   Lexi Kerr, MARCELOW  3/15/2023

## 2023-03-15 NOTE — PROGRESS NOTES
CLINICAL PHARMACY NOTE: MEDS TO BEDS    Total # of Prescriptions Filled: 1   The following medications were delivered to the patient:  Eliquis starter pack    Additional Documentation:   Delivered to pt @ 12:30pm

## 2023-03-15 NOTE — PROGRESS NOTES
Hospitalist Discharge Summary    Patient ID: Libertad Cross   Patient : 1955  Patient's PCP: Lara Asencio MD    Admit Date: 3/12/2023   Admitting Physician: Raleigh Adame DO    Discharge Date:  3/15/2023   Discharge Physician: Pravin Pollock MD   Discharge Condition: Stable  Discharge Disposition: [facility] course in brief:  (Please refer to daily progress notes for a comprehensive review of the hospitalization by requesting medical records)   Libertad Cross is a 74 y.o. male with hx of CKD with left AVF in place that has not yet been used who presented with leg pain and difficulty ambulating and was found to have DVTs. He was placed on a heparin gtt here and shortly after noted an expanding hematoma over the left shin with no external bleeding. The heparin gtt is now stopped. He has no history of DVTs prior to this episode, no family history of DVTs or coagulation disorders. He does state that just prior to coming to the hospital he fell and believes he may of landed on his leg. Consult vascular surgery-okay to continue heparin drip without a bolus dose, continue compression wrapping. Hemoglobin was monitored and continued to be stable. Patient was switched to oral Eliquis after around 48 hours of heparin drip. Patient does have a history of chronic kidney disease with baseline cr of 2.4-2.6 with currently being at 2.8-2.9-his nephrotoxic medications were held at this time prior to discharge with follow-up arranged with nephrology outpatient-discussed with Dr. Ruddy Silva prior to discharge-holding lisinopril, allopurinol, colchicine. Blood pressure has been holding well during hospitalization. Patient was deemed stable for DC with  close f/u with PCP, Nephrology.       Consults:   IP CONSULT TO INTERNAL MEDICINE  IP CONSULT TO VASCULAR SURGERY    Discharge Diagnoses:  -Left lower extremity DVT  -Supratherapeutic PTT  -Large hematoma left lower extremity  -Ambulatory dysfunction  -CKD stage IV  -Hypertension  -Obesity class III    Discharge Instructions / Follow up: Follow-up with PCP within 1 week of discharge. Follow-up with consultants as indicated by them. Compliance with medications as prescribed on discharge. No future appointments. The patient's condition is stable. At this time the patient is without objective evidence of an acute process requiring continuing hospitalization or inpatient management. They are stable for discharge with outpatient follow-up. I have spoken with the patient and discussed the results of the current hospitalization, in addition to providing specific details for the plan of care and counseling regarding the diagnosis and prognosis. The plan has been discussed in detail and they are aware of the specific conditions for emergent return, as well as the importance of follow-up. Their questions are answered at this time and they are agreeable with the plan for discharge to home. Continued appropriate risk factor modification of blood pressure, diabetes and serum lipids will remain essential to reducing risk of future atherosclerotic development    Activity: activity as tolerated    Physical exam:  General appearance: No apparent distress, appears stated age and cooperative. HEENT: Conjunctivae/corneas clear. Mucous membranes moist.  Neck: Supple. No JVD. Respiratory:  Clear to auscultation bilaterally. Normal respiratory effort. Cardiovascular:  RRR. S1, S2 without MRG. PV: Pulses palpable. No edema. Abdomen: Soft, non-tender, non-distended. +BS  Musculoskeletal: No obvious deformities. Skin: Normal skin color. No rashes or lesions. Good turgor. Neurologic:  Grossly non-focal. Awake, alert, following commands.    Psychiatric: Alert and oriented, thought content appropriate, normal insight and judgement    Significant labs:  CBC:   Recent Labs     03/13/23  0819 03/14/23  1120 03/15/23  0146   WBC 8.9 10.5 9.6   RBC 4. 93 4.43 4.56   HGB 14.2 12.7 13.2   HCT 44.2 40.2 43.2   MCV 89.7 90.7 94.7   RDW 14.8 15.3* 15.6*    170 179     BMP:   Recent Labs     03/13/23  0456 03/14/23  1120 03/15/23  0146    142 135   K 3.7 3.6 4.6    104 103   CO2 22 27 20*   BUN 48* 57* 66*   CREATININE 2.5* 2.8* 2.9*     LFT:  Recent Labs     03/12/23  1545 03/13/23  0456   PROT 7.5 7.0   ALKPHOS 73 70   ALT 24 21   AST 21 17   BILITOT 0.4 0.5     PT/INR:   Recent Labs     03/14/23  1955 03/15/23  0159 03/15/23  0814   APTT 76.4* 77.4* 144.9*     BNP: No results for input(s): BNP in the last 72 hours. Hgb A1C: No results found for: LABA1C  Folate and B12: No results found for: LUDDTCXG14, No results found for: FOLATE  Thyroid Studies:   Lab Results   Component Value Date    TSH 1.540 04/25/2019       Urinalysis:    Lab Results   Component Value Date/Time    NITRU Negative 03/12/2023 06:22 PM    WBCUA 0-1 03/12/2023 06:22 PM    BACTERIA NONE SEEN 03/12/2023 06:22 PM    RBCUA NONE 03/12/2023 06:22 PM    BLOODU Negative 03/12/2023 06:22 PM    SPECGRAV 1.015 03/12/2023 06:22 PM    GLUCOSEU Negative 03/12/2023 06:22 PM       Imaging:  XR PELVIS (1-2 VIEWS)    Result Date: 3/12/2023  EXAMINATION: ONE XRAY VIEW OF THE PELVIS 3/12/2023 5:05 pm COMPARISON: None. HISTORY: ORDERING SYSTEM PROVIDED HISTORY: weakness TECHNOLOGIST PROVIDED HISTORY: Reason for exam:->weakness What reading provider will be dictating this exam?->CRC FINDINGS: Lower lumbar degenerative disease. Mild bilateral hip DJD. No fracture or dislocation. No acute osseous findings. CT LUMBAR SPINE WO CONTRAST    Result Date: 3/12/2023  EXAMINATION: CT OF THE LUMBAR SPINE WITHOUT CONTRAST  3/12/2023 TECHNIQUE: CT of the lumbar spine was performed without the administration of intravenous contrast. Multiplanar reformatted images are provided for review. Adjustment of mA and/or kV according to patient size was utilized.   Automated exposure control, iterative reconstruction, and/or weight based adjustment of the mA/kV was utilized to reduce the radiation dose to as low as reasonably achievable. COMPARISON: None HISTORY: ORDERING SYSTEM PROVIDED HISTORY: beto morel TECHNOLOGIST PROVIDED HISTORY: Reason for exam:->[employer] walk Decision Support Exception - unselect if not a suspected or confirmed emergency medical condition->Emergency Medical Condition (MA) What reading provider will be dictating this exam?->CRC FINDINGS: BONES/ALIGNMENT: There is preservation of the normal lordotic curve. There is no evidence of fracture or subluxation. DEGENERATIVE CHANGES: Moderate to marked osteo-degenerative changes are noted throughout the visualized thoracolumbar spine, with anterior and lateral spondylitic ridging present. It is most significant in the lower thoracic spine and at L3-4. Multilevel, bilateral facet hypertrophy and sclerosis is identified. Mild bilateral neural foraminal narrowing is identified at L4-5. Moderate bilateral neural foraminal narrowing is seen at L5-S1. Significant osteo-degenerative changes are noted at the right SI joint, with osseous fusion. Moderate osteo-degenerative changes are noted at the left SI joint. No other significant osseous abnormality is identified. Intervertebral disc space narrowing is seen from L3-4 down to L5-S1. A small posterior ridge-disc complex is identified at L4-5, causing impression on the ventral thecal sac. A small posterior ridge-disc complex is seen at L5-S1, causing impression on the ventral thecal sac. The remainder of the visualized thoracolumbar cord and thecal sac are unremarkable. There is significant spinal canal stenosis is identified at T12-L1, secondary to the productive osteophyte formation. Moderate spinal canal stenosis is seen at L2-3, secondary to the facet arthropathy. Significant spinal canal stenosis is seen at L3-4, secondary to the facet arthropathy.   Moderate to marked spinal canal stenosis is seen at L4-5, secondary to the ridge-disc complex and facet arthropathy. Mild spinal canal stenosis is seen at L5-S1, secondary to the ridge-disc complex and facet arthropathy. SOFT TISSUES/RETROPERITONEUM: No paraspinal mass is seen. 1.  No fracture or subluxation. 2.  Osteo-degenerative changes and discogenic disc disease, as described above. 3.  Small posterior ridge-disc complexes at L4-5 and L5-S1. 4.  Multilevel spinal canal stenosis, with significant stenosis seen at T12-L1 and L3-4. XR CHEST PORTABLE    Result Date: 3/12/2023  EXAMINATION: ONE XRAY VIEW OF THE CHEST 3/12/2023 5:04 pm COMPARISON: None. HISTORY: ORDERING SYSTEM PROVIDED HISTORY: fatigue, weakness TECHNOLOGIST PROVIDED HISTORY: Reason for exam:->fatigue, weakness What reading provider will be dictating this exam?->CRC FINDINGS: Right hemidiaphragm eventration and right lung volume loss. Left lung hyperinflated. No consolidation, pleural effusion, or pneumothorax. Cardiomegaly. Right hemidiaphragm eventration and right lung volume loss. Hyperinflated left lung. Mild cardiomegaly. US DUP LOWER EXTREMITIES BILATERAL VENOUS    Result Date: 3/12/2023  Patient MRN:  [id number] : 1955 Age: 74 years Gender: Male Order Date:  3/12/2023 4:13 PM EXAM: US DUP LOWER EXTREMITIES BILATERAL VENOUS NUMBER OF IMAGES:  48 INDICATION:  r/o DVT r/o DVT What reading provider will be dictating this exam?->[facility] There is evidence for deep venous thrombosis in the left superficial femoral vein, the tibioperoneal trunk the common femoral vein and the popliteal vein There is otherwise good compressibility, there is good augmentation, there is good color flow.      There is evidence for deep venous thrombosis ALERT:  THIS IS AN ABNORMAL REPORT       Discharge Medications:      Medication List        START taking these medications      apixaban starter pack 5 MG Tbpk tablet  Commonly known as: Eliquis DVT/PE Starter Pack  Take 1 tablet by mouth See Admin Instructions            CHANGE how you take these medications      allopurinol 100 MG tablet  Commonly known as: ZYLOPRIM  Take 1 tablet by mouth daily  Start taking on: March 20, 2023  What changed: These instructions start on March 20, 2023. If you are unsure what to do until then, ask your doctor or other care provider. benazepril 40 MG tablet  Commonly known as: LOTENSIN  Take 0.5 tablets by mouth daily  What changed: how much to take     colchicine 0.6 MG tablet  Commonly known as: COLCRYS  Take 1 tablet by mouth every evening  Start taking on: March 20, 2023  What changed: These instructions start on March 20, 2023. If you are unsure what to do until then, ask your doctor or other care provider.             CONTINUE taking these medications      amLODIPine 10 MG tablet  Commonly known as: NORVASC     CoQ10 100 MG Caps     dicyclomine 20 MG tablet  Commonly known as: BENTYL     docusate sodium 100 MG capsule  Commonly known as: COLACE     gabapentin 300 MG capsule  Commonly known as: NEURONTIN     hydroCHLOROthiazide 25 MG tablet  Commonly known as: HYDRODIURIL     potassium chloride 20 MEQ extended release tablet  Commonly known as: KLOR-CON M     propranolol 40 MG tablet  Commonly known as: INDERAL     SUMAtriptan 25 MG tablet  Commonly known as: IMITREX     topiramate 25 MG tablet  Commonly known as: TOPAMAX     Vitamin D3 50 MCG (2000 UT) Caps            STOP taking these medications      furosemide 20 MG tablet  Commonly known as: LASIX               Where to Get Your Medications        These medications were sent to [facility], [address] [address]      Phone: [phone]   apixaban starter pack 5 MG Tbpk tablet       Information about where to get these medications is not yet available    Ask your nurse or doctor about these medications  allopurinol 100 MG tablet  benazepril 40 MG tablet  colchicine 0.6 MG tablet         Time Spent on discharge is more than 45 minutes in the examination, evaluation, counseling and review of medications and discharge plan.    +++++++++++++++++++++++++++++++++++++++++++++++++  MD Jesse Scott Physician - Hospitalist  [place], [place]  +++++++++++++++++++++++++++++++++++++++++++++++++  NOTE: This report was transcribed using voice recognition software. Every effort was made to ensure accuracy; however, inadvertent computerized transcription errors may be present.

## 2023-03-15 NOTE — DISCHARGE SUMMARY
Hospitalist Discharge Summary    Patient ID: Macario Brand   Patient : 1955  Patient's PCP: Rowdy Baker MD    Admit Date: 3/12/2023   Admitting Physician: Merary Aquino DO    Discharge Date:  3/15/2023   Discharge Physician: Vincent Dietz MD   Discharge Condition: Stable  Discharge Disposition: [facility] course in brief:  (Please refer to daily progress notes for a comprehensive review of the hospitalization by requesting medical records)   Macario Brand is a 74 y.o. male with hx of CKD with left AVF in place that has not yet been used who presented with leg pain and difficulty ambulating and was found to have DVTs. He was placed on a heparin gtt here and shortly after noted an expanding hematoma over the left shin with no external bleeding. The heparin gtt is now stopped. He has no history of DVTs prior to this episode, no family history of DVTs or coagulation disorders. He does state that just prior to coming to the hospital he fell and believes he may of landed on his leg. Consult vascular surgery-okay to continue heparin drip without a bolus dose, continue compression wrapping. Hemoglobin was monitored and continued to be stable. Patient was switched to oral Eliquis after around 48 hours of heparin drip. Patient does have a history of chronic kidney disease with baseline cr of 2.4-2.6 with currently being at 2.8-2.9-his nephrotoxic medications were held at this time prior to discharge with follow-up arranged with nephrology outpatient-discussed with Dr. Gosia Flores prior to discharge-holding lisinopril, allopurinol, colchicine. Blood pressure has been holding well during hospitalization. Patient was deemed stable for DC with  close f/u with PCP, Nephrology.       Consults:   IP CONSULT TO INTERNAL MEDICINE  IP CONSULT TO VASCULAR SURGERY    Discharge Diagnoses:  -Left lower extremity DVT  -Supratherapeutic PTT  -Large hematoma left lower extremity  -Ambulatory dysfunction  -CKD stage IV  -Hypertension  -Obesity class III    Discharge Instructions / Follow up: Follow-up with PCP within 1 week of discharge. Follow-up with consultants as indicated by them. Compliance with medications as prescribed on discharge. No future appointments. The patient's condition is stable. At this time the patient is without objective evidence of an acute process requiring continuing hospitalization or inpatient management. They are stable for discharge with outpatient follow-up. I have spoken with the patient and discussed the results of the current hospitalization, in addition to providing specific details for the plan of care and counseling regarding the diagnosis and prognosis. The plan has been discussed in detail and they are aware of the specific conditions for emergent return, as well as the importance of follow-up. Their questions are answered at this time and they are agreeable with the plan for discharge to home. Continued appropriate risk factor modification of blood pressure, diabetes and serum lipids will remain essential to reducing risk of future atherosclerotic development    Activity: activity as tolerated    Physical exam:  General appearance: No apparent distress, appears stated age and cooperative. HEENT: Conjunctivae/corneas clear. Mucous membranes moist.  Neck: Supple. No JVD. Respiratory:  Clear to auscultation bilaterally. Normal respiratory effort. Cardiovascular:  RRR. S1, S2 without MRG. PV: Pulses palpable. No edema. Abdomen: Soft, non-tender, non-distended. +BS  Musculoskeletal: No obvious deformities. Skin: Normal skin color. No rashes or lesions. Good turgor. Neurologic:  Grossly non-focal. Awake, alert, following commands.    Psychiatric: Alert and oriented, thought content appropriate, normal insight and judgement    Significant labs:  CBC:   Recent Labs     03/13/23  0819 03/14/23  1120 03/15/23  0146   WBC 8.9 10.5 9.6   RBC 4. 93 4.43 4.56   HGB 14.2 12.7 13.2   HCT 44.2 40.2 43.2   MCV 89.7 90.7 94.7   RDW 14.8 15.3* 15.6*    170 179     BMP:   Recent Labs     03/13/23  0456 03/14/23  1120 03/15/23  0146    142 135   K 3.7 3.6 4.6    104 103   CO2 22 27 20*   BUN 48* 57* 66*   CREATININE 2.5* 2.8* 2.9*     LFT:  Recent Labs     03/12/23  1545 03/13/23  0456   PROT 7.5 7.0   ALKPHOS 73 70   ALT 24 21   AST 21 17   BILITOT 0.4 0.5     PT/INR:   Recent Labs     03/14/23  1955 03/15/23  0159 03/15/23  0814   APTT 76.4* 77.4* 144.9*     BNP: No results for input(s): BNP in the last 72 hours. Hgb A1C: No results found for: LABA1C  Folate and B12: No results found for: PNXKENEY58, No results found for: FOLATE  Thyroid Studies:   Lab Results   Component Value Date    TSH 1.540 04/25/2019       Urinalysis:    Lab Results   Component Value Date/Time    NITRU Negative 03/12/2023 06:22 PM    WBCUA 0-1 03/12/2023 06:22 PM    BACTERIA NONE SEEN 03/12/2023 06:22 PM    RBCUA NONE 03/12/2023 06:22 PM    BLOODU Negative 03/12/2023 06:22 PM    SPECGRAV 1.015 03/12/2023 06:22 PM    GLUCOSEU Negative 03/12/2023 06:22 PM       Imaging:  XR PELVIS (1-2 VIEWS)    Result Date: 3/12/2023  EXAMINATION: ONE XRAY VIEW OF THE PELVIS 3/12/2023 5:05 pm COMPARISON: None. HISTORY: ORDERING SYSTEM PROVIDED HISTORY: weakness TECHNOLOGIST PROVIDED HISTORY: Reason for exam:->weakness What reading provider will be dictating this exam?->CRC FINDINGS: Lower lumbar degenerative disease. Mild bilateral hip DJD. No fracture or dislocation. No acute osseous findings. CT LUMBAR SPINE WO CONTRAST    Result Date: 3/12/2023  EXAMINATION: CT OF THE LUMBAR SPINE WITHOUT CONTRAST  3/12/2023 TECHNIQUE: CT of the lumbar spine was performed without the administration of intravenous contrast. Multiplanar reformatted images are provided for review. Adjustment of mA and/or kV according to patient size was utilized.   Automated exposure control, iterative reconstruction, and/or weight based adjustment of the mA/kV was utilized to reduce the radiation dose to as low as reasonably achievable. COMPARISON: None HISTORY: ORDERING SYSTEM PROVIDED HISTORY: beto morel TECHNOLOGIST PROVIDED HISTORY: Reason for exam:->[employer] walk Decision Support Exception - unselect if not a suspected or confirmed emergency medical condition->Emergency Medical Condition (MA) What reading provider will be dictating this exam?->CRC FINDINGS: BONES/ALIGNMENT: There is preservation of the normal lordotic curve. There is no evidence of fracture or subluxation. DEGENERATIVE CHANGES: Moderate to marked osteo-degenerative changes are noted throughout the visualized thoracolumbar spine, with anterior and lateral spondylitic ridging present. It is most significant in the lower thoracic spine and at L3-4. Multilevel, bilateral facet hypertrophy and sclerosis is identified. Mild bilateral neural foraminal narrowing is identified at L4-5. Moderate bilateral neural foraminal narrowing is seen at L5-S1. Significant osteo-degenerative changes are noted at the right SI joint, with osseous fusion. Moderate osteo-degenerative changes are noted at the left SI joint. No other significant osseous abnormality is identified. Intervertebral disc space narrowing is seen from L3-4 down to L5-S1. A small posterior ridge-disc complex is identified at L4-5, causing impression on the ventral thecal sac. A small posterior ridge-disc complex is seen at L5-S1, causing impression on the ventral thecal sac. The remainder of the visualized thoracolumbar cord and thecal sac are unremarkable. There is significant spinal canal stenosis is identified at T12-L1, secondary to the productive osteophyte formation. Moderate spinal canal stenosis is seen at L2-3, secondary to the facet arthropathy. Significant spinal canal stenosis is seen at L3-4, secondary to the facet arthropathy.   Moderate to marked spinal canal stenosis is seen at L4-5, secondary to the ridge-disc complex and facet arthropathy. Mild spinal canal stenosis is seen at L5-S1, secondary to the ridge-disc complex and facet arthropathy. SOFT TISSUES/RETROPERITONEUM: No paraspinal mass is seen. 1.  No fracture or subluxation. 2.  Osteo-degenerative changes and discogenic disc disease, as described above. 3.  Small posterior ridge-disc complexes at L4-5 and L5-S1. 4.  Multilevel spinal canal stenosis, with significant stenosis seen at T12-L1 and L3-4. XR CHEST PORTABLE    Result Date: 3/12/2023  EXAMINATION: ONE XRAY VIEW OF THE CHEST 3/12/2023 5:04 pm COMPARISON: None. HISTORY: ORDERING SYSTEM PROVIDED HISTORY: fatigue, weakness TECHNOLOGIST PROVIDED HISTORY: Reason for exam:->fatigue, weakness What reading provider will be dictating this exam?->CRC FINDINGS: Right hemidiaphragm eventration and right lung volume loss. Left lung hyperinflated. No consolidation, pleural effusion, or pneumothorax. Cardiomegaly. Right hemidiaphragm eventration and right lung volume loss. Hyperinflated left lung. Mild cardiomegaly. US DUP LOWER EXTREMITIES BILATERAL VENOUS    Result Date: 3/12/2023  Patient MRN:  [id number] : 1955 Age: 74 years Gender: Male Order Date:  3/12/2023 4:13 PM EXAM: US DUP LOWER EXTREMITIES BILATERAL VENOUS NUMBER OF IMAGES:  48 INDICATION:  r/o DVT r/o DVT What reading provider will be dictating this exam?->[facility] There is evidence for deep venous thrombosis in the left superficial femoral vein, the tibioperoneal trunk the common femoral vein and the popliteal vein There is otherwise good compressibility, there is good augmentation, there is good color flow.      There is evidence for deep venous thrombosis ALERT:  THIS IS AN ABNORMAL REPORT       Discharge Medications:      Medication List        START taking these medications      apixaban starter pack 5 MG Tbpk tablet  Commonly known as: Eliquis DVT/PE Starter Pack  Take 1 tablet by mouth See Admin Instructions            CHANGE how you take these medications      allopurinol 100 MG tablet  Commonly known as: ZYLOPRIM  Take 1 tablet by mouth daily  Start taking on: March 20, 2023  What changed: These instructions start on March 20, 2023. If you are unsure what to do until then, ask your doctor or other care provider. benazepril 40 MG tablet  Commonly known as: LOTENSIN  Take 0.5 tablets by mouth daily  What changed: how much to take     colchicine 0.6 MG tablet  Commonly known as: COLCRYS  Take 1 tablet by mouth every evening  Start taking on: March 20, 2023  What changed: These instructions start on March 20, 2023. If you are unsure what to do until then, ask your doctor or other care provider.             CONTINUE taking these medications      amLODIPine 10 MG tablet  Commonly known as: NORVASC     CoQ10 100 MG Caps     dicyclomine 20 MG tablet  Commonly known as: BENTYL     docusate sodium 100 MG capsule  Commonly known as: COLACE     gabapentin 300 MG capsule  Commonly known as: NEURONTIN     hydroCHLOROthiazide 25 MG tablet  Commonly known as: HYDRODIURIL     potassium chloride 20 MEQ extended release tablet  Commonly known as: KLOR-CON M     propranolol 40 MG tablet  Commonly known as: INDERAL     SUMAtriptan 25 MG tablet  Commonly known as: IMITREX     topiramate 25 MG tablet  Commonly known as: TOPAMAX     Vitamin D3 50 MCG (2000 UT) Caps            STOP taking these medications      furosemide 20 MG tablet  Commonly known as: LASIX               Where to Get Your Medications        These medications were sent to [facility] [address] [address]      Phone: [phone]   apixaban starter pack 5 MG Tbpk tablet       Information about where to get these medications is not yet available    Ask your nurse or doctor about these medications  allopurinol 100 MG tablet  benazepril 40 MG tablet  colchicine 0.6 MG tablet         Time Spent on discharge is more than 45 minutes in the examination, evaluation, counseling and review of medications and discharge plan.    +++++++++++++++++++++++++++++++++++++++++++++++++  MD MERARI Max/ Peter Mccarty [address] [place] [address]  +++++++++++++++++++++++++++++++++++++++++++++++++  NOTE: This report was transcribed using voice recognition software. Every effort was made to ensure accuracy; however, inadvertent computerized transcription errors may be present.

## 2023-03-24 LAB — SARS-COV-2: DETECTED

## 2023-03-25 ENCOUNTER — HOSPITAL ENCOUNTER (INPATIENT)
Age: 68
LOS: 5 days | Discharge: NURSING, REHAB OR LONG-TERM CARE | DRG: 091 | End: 2023-03-30
Attending: STUDENT IN AN ORGANIZED HEALTH CARE EDUCATION/TRAINING PROGRAM | Admitting: STUDENT IN AN ORGANIZED HEALTH CARE EDUCATION/TRAINING PROGRAM
Payer: MEDICARE

## 2023-03-25 PROBLEM — R90.0 INTRACRANIAL MASS: Status: ACTIVE | Noted: 2023-03-25

## 2023-03-25 LAB
ALBUMIN SERPL-MCNC: 3.5 G/DL (ref 3.5–5.2)
ALP SERPL-CCNC: 71 U/L (ref 40–129)
ALT SERPL-CCNC: 30 U/L (ref 0–40)
ANION GAP SERPL CALCULATED.3IONS-SCNC: 15 MMOL/L (ref 7–16)
AST SERPL-CCNC: 32 U/L (ref 0–39)
BASOPHILS # BLD: 0.03 E9/L (ref 0–0.2)
BASOPHILS NFR BLD: 0.5 % (ref 0–2)
BILIRUB SERPL-MCNC: 0.4 MG/DL (ref 0–1.2)
BUN SERPL-MCNC: 52 MG/DL (ref 6–23)
CALCIUM SERPL-MCNC: 9 MG/DL (ref 8.6–10.2)
CHLORIDE SERPL-SCNC: 110 MMOL/L (ref 98–107)
CO2 SERPL-SCNC: 19 MMOL/L (ref 22–29)
CREAT SERPL-MCNC: 2.6 MG/DL (ref 0.7–1.2)
EOSINOPHIL # BLD: 0.01 E9/L (ref 0.05–0.5)
EOSINOPHIL NFR BLD: 0.2 % (ref 0–6)
ERYTHROCYTE [DISTWIDTH] IN BLOOD BY AUTOMATED COUNT: 15.5 FL (ref 11.5–15)
GLUCOSE SERPL-MCNC: 98 MG/DL (ref 74–99)
HCT VFR BLD AUTO: 39.8 % (ref 37–54)
HGB BLD-MCNC: 13 G/DL (ref 12.5–16.5)
IMM GRANULOCYTES # BLD: 0.02 E9/L
IMM GRANULOCYTES NFR BLD: 0.3 % (ref 0–5)
LYMPHOCYTES # BLD: 0.65 E9/L (ref 1.5–4)
LYMPHOCYTES NFR BLD: 11.3 % (ref 20–42)
MCH RBC QN AUTO: 29 PG (ref 26–35)
MCHC RBC AUTO-ENTMCNC: 32.7 % (ref 32–34.5)
MCV RBC AUTO: 88.6 FL (ref 80–99.9)
MONOCYTES # BLD: 0.52 E9/L (ref 0.1–0.95)
MONOCYTES NFR BLD: 9 % (ref 2–12)
NEUTROPHILS # BLD: 4.54 E9/L (ref 1.8–7.3)
NEUTS SEG NFR BLD: 78.7 % (ref 43–80)
PLATELET # BLD AUTO: 164 E9/L (ref 130–450)
PMV BLD AUTO: 11.5 FL (ref 7–12)
POTASSIUM SERPL-SCNC: 4.1 MMOL/L (ref 3.5–5)
PROT SERPL-MCNC: 6.7 G/DL (ref 6.4–8.3)
RBC # BLD AUTO: 4.49 E12/L (ref 3.8–5.8)
SODIUM SERPL-SCNC: 144 MMOL/L (ref 132–146)
WBC # BLD: 5.8 E9/L (ref 4.5–11.5)

## 2023-03-25 PROCEDURE — 2060000000 HC ICU INTERMEDIATE R&B

## 2023-03-25 PROCEDURE — 85025 COMPLETE CBC W/AUTO DIFF WBC: CPT

## 2023-03-25 PROCEDURE — 80053 COMPREHEN METABOLIC PANEL: CPT

## 2023-03-25 PROCEDURE — 85378 FIBRIN DEGRADE SEMIQUANT: CPT

## 2023-03-25 PROCEDURE — 84443 ASSAY THYROID STIM HORMONE: CPT

## 2023-03-25 PROCEDURE — 99222 1ST HOSP IP/OBS MODERATE 55: CPT | Performed by: NEUROLOGICAL SURGERY

## 2023-03-25 PROCEDURE — 2580000003 HC RX 258: Performed by: FAMILY MEDICINE

## 2023-03-25 PROCEDURE — 6360000002 HC RX W HCPCS: Performed by: INTERNAL MEDICINE

## 2023-03-25 PROCEDURE — 86140 C-REACTIVE PROTEIN: CPT

## 2023-03-25 PROCEDURE — 6370000000 HC RX 637 (ALT 250 FOR IP): Performed by: FAMILY MEDICINE

## 2023-03-25 PROCEDURE — 36415 COLL VENOUS BLD VENIPUNCTURE: CPT

## 2023-03-25 RX ORDER — LORAZEPAM 2 MG/ML
1 INJECTION INTRAMUSCULAR SEE ADMIN INSTRUCTIONS
Status: DISCONTINUED | OUTPATIENT
Start: 2023-03-25 | End: 2023-03-30 | Stop reason: HOSPADM

## 2023-03-25 RX ORDER — PROPRANOLOL HYDROCHLORIDE 20 MG/1
40 TABLET ORAL EVERY EVENING
Status: DISCONTINUED | OUTPATIENT
Start: 2023-03-25 | End: 2023-03-26

## 2023-03-25 RX ORDER — ACETAMINOPHEN 325 MG/1
650 TABLET ORAL EVERY 6 HOURS PRN
Status: DISCONTINUED | OUTPATIENT
Start: 2023-03-25 | End: 2023-03-30 | Stop reason: HOSPADM

## 2023-03-25 RX ORDER — COLCHICINE 0.6 MG/1
0.6 TABLET ORAL EVERY EVENING
Status: DISCONTINUED | OUTPATIENT
Start: 2023-03-25 | End: 2023-03-30 | Stop reason: HOSPADM

## 2023-03-25 RX ORDER — PROMETHAZINE HYDROCHLORIDE 12.5 MG/1
12.5 TABLET ORAL EVERY 6 HOURS PRN
Status: DISCONTINUED | OUTPATIENT
Start: 2023-03-25 | End: 2023-03-30 | Stop reason: HOSPADM

## 2023-03-25 RX ORDER — POTASSIUM CHLORIDE 20 MEQ/1
20 TABLET, EXTENDED RELEASE ORAL EVERY EVENING
Status: DISCONTINUED | OUTPATIENT
Start: 2023-03-25 | End: 2023-03-26

## 2023-03-25 RX ORDER — ALLOPURINOL 100 MG/1
100 TABLET ORAL DAILY
Status: DISCONTINUED | OUTPATIENT
Start: 2023-03-25 | End: 2023-03-30 | Stop reason: HOSPADM

## 2023-03-25 RX ORDER — DOCUSATE SODIUM 100 MG/1
100 CAPSULE, LIQUID FILLED ORAL EVERY EVENING
Status: DISCONTINUED | OUTPATIENT
Start: 2023-03-25 | End: 2023-03-30 | Stop reason: HOSPADM

## 2023-03-25 RX ORDER — CHOLECALCIFEROL (VITAMIN D3) 50 MCG
2000 TABLET ORAL EVERY EVENING
Status: DISCONTINUED | OUTPATIENT
Start: 2023-03-25 | End: 2023-03-30 | Stop reason: HOSPADM

## 2023-03-25 RX ORDER — TOPIRAMATE 25 MG/1
25 TABLET ORAL EVERY EVENING
Status: DISCONTINUED | OUTPATIENT
Start: 2023-03-25 | End: 2023-03-30 | Stop reason: HOSPADM

## 2023-03-25 RX ORDER — ONDANSETRON 2 MG/ML
4 INJECTION INTRAMUSCULAR; INTRAVENOUS EVERY 6 HOURS PRN
Status: DISCONTINUED | OUTPATIENT
Start: 2023-03-25 | End: 2023-03-30 | Stop reason: HOSPADM

## 2023-03-25 RX ORDER — SODIUM CHLORIDE 0.9 % (FLUSH) 0.9 %
10 SYRINGE (ML) INJECTION PRN
Status: DISCONTINUED | OUTPATIENT
Start: 2023-03-25 | End: 2023-03-30 | Stop reason: HOSPADM

## 2023-03-25 RX ORDER — SODIUM CHLORIDE 9 MG/ML
INJECTION, SOLUTION INTRAVENOUS PRN
Status: DISCONTINUED | OUTPATIENT
Start: 2023-03-25 | End: 2023-03-30 | Stop reason: HOSPADM

## 2023-03-25 RX ORDER — AMLODIPINE BESYLATE 10 MG/1
10 TABLET ORAL EVERY EVENING
Status: DISCONTINUED | OUTPATIENT
Start: 2023-03-25 | End: 2023-03-26

## 2023-03-25 RX ORDER — HYDROCHLOROTHIAZIDE 25 MG/1
25 TABLET ORAL EVERY EVENING
Status: DISCONTINUED | OUTPATIENT
Start: 2023-03-25 | End: 2023-03-30 | Stop reason: HOSPADM

## 2023-03-25 RX ORDER — SODIUM CHLORIDE 0.9 % (FLUSH) 0.9 %
10 SYRINGE (ML) INJECTION EVERY 12 HOURS SCHEDULED
Status: DISCONTINUED | OUTPATIENT
Start: 2023-03-25 | End: 2023-03-30 | Stop reason: HOSPADM

## 2023-03-25 RX ORDER — DEXAMETHASONE SODIUM PHOSPHATE 4 MG/ML
2 INJECTION, SOLUTION INTRA-ARTICULAR; INTRALESIONAL; INTRAMUSCULAR; INTRAVENOUS; SOFT TISSUE EVERY 12 HOURS
Status: DISCONTINUED | OUTPATIENT
Start: 2023-03-25 | End: 2023-03-30 | Stop reason: HOSPADM

## 2023-03-25 RX ORDER — NYSTATIN 100000 U/G
CREAM TOPICAL 2 TIMES DAILY
Status: DISCONTINUED | OUTPATIENT
Start: 2023-03-25 | End: 2023-03-30 | Stop reason: HOSPADM

## 2023-03-25 RX ORDER — DICYCLOMINE HYDROCHLORIDE 10 MG/1
40 CAPSULE ORAL 3 TIMES DAILY
Status: DISCONTINUED | OUTPATIENT
Start: 2023-03-25 | End: 2023-03-30 | Stop reason: HOSPADM

## 2023-03-25 RX ORDER — ACETAMINOPHEN 650 MG/1
650 SUPPOSITORY RECTAL EVERY 6 HOURS PRN
Status: DISCONTINUED | OUTPATIENT
Start: 2023-03-25 | End: 2023-03-30 | Stop reason: HOSPADM

## 2023-03-25 RX ORDER — POLYETHYLENE GLYCOL 3350 17 G/17G
17 POWDER, FOR SOLUTION ORAL DAILY PRN
Status: DISCONTINUED | OUTPATIENT
Start: 2023-03-25 | End: 2023-03-30 | Stop reason: HOSPADM

## 2023-03-25 RX ORDER — GABAPENTIN 300 MG/1
300 CAPSULE ORAL 3 TIMES DAILY PRN
Status: DISCONTINUED | OUTPATIENT
Start: 2023-03-25 | End: 2023-03-30 | Stop reason: HOSPADM

## 2023-03-25 RX ORDER — LISINOPRIL 10 MG/1
40 TABLET ORAL DAILY
Status: DISCONTINUED | OUTPATIENT
Start: 2023-03-25 | End: 2023-03-26

## 2023-03-25 RX ADMIN — DICYCLOMINE HYDROCHLORIDE 40 MG: 10 CAPSULE ORAL at 14:15

## 2023-03-25 RX ADMIN — DEXAMETHASONE SODIUM PHOSPHATE 2 MG: 4 INJECTION, SOLUTION INTRAMUSCULAR; INTRAVENOUS at 14:15

## 2023-03-25 RX ADMIN — Medication 2000 UNITS: at 17:17

## 2023-03-25 RX ADMIN — SODIUM CHLORIDE, PRESERVATIVE FREE 10 ML: 5 INJECTION INTRAVENOUS at 17:16

## 2023-03-25 RX ADMIN — AMLODIPINE BESYLATE 10 MG: 10 TABLET ORAL at 17:17

## 2023-03-25 RX ADMIN — POTASSIUM CHLORIDE 20 MEQ: 1500 TABLET, EXTENDED RELEASE ORAL at 17:17

## 2023-03-25 RX ADMIN — HYDROCHLOROTHIAZIDE 25 MG: 25 TABLET ORAL at 17:17

## 2023-03-25 RX ADMIN — ACETAMINOPHEN 650 MG: 325 TABLET ORAL at 20:52

## 2023-03-25 RX ADMIN — SODIUM CHLORIDE, PRESERVATIVE FREE 10 ML: 5 INJECTION INTRAVENOUS at 20:52

## 2023-03-25 RX ADMIN — DICYCLOMINE HYDROCHLORIDE 40 MG: 10 CAPSULE ORAL at 20:52

## 2023-03-25 RX ADMIN — COLCHICINE 0.6 MG: 0.6 TABLET ORAL at 17:17

## 2023-03-25 RX ADMIN — GABAPENTIN 300 MG: 300 CAPSULE ORAL at 16:21

## 2023-03-25 RX ADMIN — ACETAMINOPHEN 650 MG: 325 TABLET ORAL at 16:21

## 2023-03-25 RX ADMIN — TOPIRAMATE 25 MG: 25 TABLET, FILM COATED ORAL at 17:17

## 2023-03-25 RX ADMIN — PROPRANOLOL HYDROCHLORIDE 40 MG: 20 TABLET ORAL at 17:17

## 2023-03-25 RX ADMIN — DOCUSATE SODIUM 100 MG: 100 CAPSULE, LIQUID FILLED ORAL at 17:17

## 2023-03-25 ASSESSMENT — PAIN SCALES - WONG BAKER
WONGBAKER_NUMERICALRESPONSE: 0
WONGBAKER_NUMERICALRESPONSE: 0

## 2023-03-25 ASSESSMENT — PAIN DESCRIPTION - LOCATION
LOCATION: BACK
LOCATION: BACK

## 2023-03-25 ASSESSMENT — PAIN SCALES - GENERAL
PAINLEVEL_OUTOF10: 0
PAINLEVEL_OUTOF10: 2
PAINLEVEL_OUTOF10: 4
PAINLEVEL_OUTOF10: 7

## 2023-03-25 ASSESSMENT — PAIN - FUNCTIONAL ASSESSMENT: PAIN_FUNCTIONAL_ASSESSMENT: PREVENTS OR INTERFERES SOME ACTIVE ACTIVITIES AND ADLS

## 2023-03-25 ASSESSMENT — PAIN DESCRIPTION - DESCRIPTORS: DESCRIPTORS: PINS AND NEEDLES

## 2023-03-25 NOTE — LETTER
[place] Department Medicaid  CERTIFICATION OF NECESSITY  FOR NON-EMERGENCY TRANSPORTATION   BY GROUND AMBULANCE      Individual Information   1. Name: Joanie Dandy 2. [place] Medicaid Billing Number:    3. Address: [address] [place] [address]      Transportation Provider Information   4. Provider Name:    5. [place] Medicaid Provider Number:  National Provider Identifier (NPI):     Certification  7. Criteria:  During transport, this individual requires:  [x] Medical treatment or continuous     supervision by an EMT. [] The administration or regulation of oxygen by another person. [] Supervised protective restraint. 8. Period Beginning Date:    5. Length  [x] Not more than 1 day(s)  [] One Year     Additional Information Relevant to Certification   10. 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 Name of Practitioner: Tamera Agee MD   12. [place] Medicaid Provider Number, If Applicable:  [address] Provider Identifier (NPI):      Signature Information   14. Date of Signature:  13. Name of Person Signin. Signature and Professional Designation: Tamera Agee MD     [facility] [id number]  Rev. 2015      [id number]  [address] Encounter Date/Time: 3/25/2023 [address] Account: [de-identified]    MRN: [id number]    Patient: Joanie Dandy    Contact Serial #: [id number]      ENCOUNTER          Patient Class: I Private Enc?   No Unit RM BDRise Na [facility] [id number]/[id number]-I   Hospital Service: MED   Encounter DX: Intracranial mass [R90.0]   ADM Provider: Tamera Agee DO   Procedure:     ATT Provider: Tamera Agee DO   REF Provider: Olga Hale      Admission DX: Intracranial mass and DX codes: R90.0      PATIENT                 Name: Joanie Dandy : 1955 (67 yrs)   Address: [place] [address] Sex: Male   [place] [address]         [place]

## 2023-03-25 NOTE — H&P
provided for review. Adjustment of mA and/or kV according to patient size was utilized. Automated exposure control, iterative reconstruction, and/or weight based adjustment of the mA/kV was utilized to reduce the radiation dose to as low as reasonably achievable. COMPARISON: None HISTORY: ORDERING SYSTEM PROVIDED HISTORY: beto walk TECHNOLOGIST PROVIDED HISTORY: Reason for exam:->cant walk Decision Support Exception - unselect if not a suspected or confirmed emergency medical condition->Emergency Medical Condition (MA) What reading provider will be dictating this exam?->CRC FINDINGS: BONES/ALIGNMENT: There is preservation of the normal lordotic curve. There is no evidence of fracture or subluxation. DEGENERATIVE CHANGES: Moderate to marked osteo-degenerative changes are noted throughout the visualized thoracolumbar spine, with anterior and lateral spondylitic ridging present. It is most significant in the lower thoracic spine and at L3-4. Multilevel, bilateral facet hypertrophy and sclerosis is identified. Mild bilateral neural foraminal narrowing is identified at L4-5. Moderate bilateral neural foraminal narrowing is seen at L5-S1. Significant osteo-degenerative changes are noted at the right SI joint, with osseous fusion. Moderate osteo-degenerative changes are noted at the left SI joint. No other significant osseous abnormality is identified. Intervertebral disc space narrowing is seen from L3-4 down to L5-S1. A small posterior ridge-disc complex is identified at L4-5, causing impression on the ventral thecal sac. A small posterior ridge-disc complex is seen at L5-S1, causing impression on the ventral thecal sac. The remainder of the visualized thoracolumbar cord and thecal sac are unremarkable. There is significant spinal canal stenosis is identified at T12-L1, secondary to the productive osteophyte formation. Moderate spinal canal stenosis is seen at L2-3, secondary to the facet arthropathy.

## 2023-03-25 NOTE — PLAN OF CARE
Problem: Discharge Planning  Goal: Discharge to home or other facility with appropriate resources  3/25/2023 1049 by Joel Vasquez RN  Outcome: Progressing  3/25/2023 0410 by Darling Cordero RN  Outcome: Progressing     Problem: Skin/Tissue Integrity  Goal: Absence of new skin breakdown  Description: 1. Monitor for areas of redness and/or skin breakdown  2. Assess vascular access sites hourly  3. Every 4-6 hours minimum:  Change oxygen saturation probe site  4. Every 4-6 hours:  If on nasal continuous positive airway pressure, respiratory therapy assess nares and determine need for appliance change or resting period.   3/25/2023 1049 by Joel Vasquez RN  Outcome: Progressing  3/25/2023 0410 by Darling Cordero RN  Outcome: Progressing     Problem: Safety - Adult  Goal: Free from fall injury  3/25/2023 1049 by Joel Vasquez RN  Outcome: Progressing  3/25/2023 0410 by Darling Cordero RN  Outcome: Progressing

## 2023-03-26 LAB
CRP SERPL HS-MCNC: 7.8 MG/DL (ref 0–0.4)
D DIMER: 529 NG/ML DDU

## 2023-03-26 PROCEDURE — 6370000000 HC RX 637 (ALT 250 FOR IP): Performed by: FAMILY MEDICINE

## 2023-03-26 PROCEDURE — 99232 SBSQ HOSP IP/OBS MODERATE 35: CPT | Performed by: NEUROLOGICAL SURGERY

## 2023-03-26 PROCEDURE — 2580000003 HC RX 258: Performed by: FAMILY MEDICINE

## 2023-03-26 PROCEDURE — 6370000000 HC RX 637 (ALT 250 FOR IP): Performed by: INTERNAL MEDICINE

## 2023-03-26 PROCEDURE — 6360000002 HC RX W HCPCS: Performed by: INTERNAL MEDICINE

## 2023-03-26 PROCEDURE — 2060000000 HC ICU INTERMEDIATE R&B

## 2023-03-26 PROCEDURE — 99223 1ST HOSP IP/OBS HIGH 75: CPT | Performed by: SURGERY

## 2023-03-26 PROCEDURE — 6370000000 HC RX 637 (ALT 250 FOR IP): Performed by: EMERGENCY MEDICINE

## 2023-03-26 RX ORDER — LISINOPRIL 10 MG/1
20 TABLET ORAL DAILY
Status: DISCONTINUED | OUTPATIENT
Start: 2023-03-26 | End: 2023-03-30 | Stop reason: HOSPADM

## 2023-03-26 RX ORDER — PROPRANOLOL HYDROCHLORIDE 20 MG/1
20 TABLET ORAL 2 TIMES DAILY
Status: DISCONTINUED | OUTPATIENT
Start: 2023-03-26 | End: 2023-03-29

## 2023-03-26 RX ADMIN — TOPIRAMATE 25 MG: 25 TABLET, FILM COATED ORAL at 17:15

## 2023-03-26 RX ADMIN — Medication 2000 UNITS: at 17:15

## 2023-03-26 RX ADMIN — DICYCLOMINE HYDROCHLORIDE 40 MG: 10 CAPSULE ORAL at 21:30

## 2023-03-26 RX ADMIN — LISINOPRIL 20 MG: 10 TABLET ORAL at 10:04

## 2023-03-26 RX ADMIN — DOCUSATE SODIUM 100 MG: 100 CAPSULE, LIQUID FILLED ORAL at 17:15

## 2023-03-26 RX ADMIN — ALLOPURINOL 100 MG: 100 TABLET ORAL at 10:04

## 2023-03-26 RX ADMIN — COLCHICINE 0.6 MG: 0.6 TABLET ORAL at 17:15

## 2023-03-26 RX ADMIN — NYSTATIN: 100000 CREAM TOPICAL at 10:03

## 2023-03-26 RX ADMIN — DEXAMETHASONE SODIUM PHOSPHATE 2 MG: 4 INJECTION, SOLUTION INTRAMUSCULAR; INTRAVENOUS at 01:08

## 2023-03-26 RX ADMIN — SODIUM CHLORIDE, PRESERVATIVE FREE 10 ML: 5 INJECTION INTRAVENOUS at 10:04

## 2023-03-26 RX ADMIN — DEXAMETHASONE SODIUM PHOSPHATE 2 MG: 4 INJECTION, SOLUTION INTRAMUSCULAR; INTRAVENOUS at 14:47

## 2023-03-26 RX ADMIN — HYDROCHLOROTHIAZIDE 25 MG: 25 TABLET ORAL at 17:15

## 2023-03-26 RX ADMIN — ACETAMINOPHEN 650 MG: 325 TABLET ORAL at 10:05

## 2023-03-26 RX ADMIN — DICYCLOMINE HYDROCHLORIDE 40 MG: 10 CAPSULE ORAL at 11:35

## 2023-03-26 RX ADMIN — GABAPENTIN 300 MG: 300 CAPSULE ORAL at 10:05

## 2023-03-26 RX ADMIN — SODIUM CHLORIDE, PRESERVATIVE FREE 10 ML: 5 INJECTION INTRAVENOUS at 21:29

## 2023-03-26 RX ADMIN — GABAPENTIN 300 MG: 300 CAPSULE ORAL at 21:29

## 2023-03-26 RX ADMIN — ACETAMINOPHEN 650 MG: 325 TABLET ORAL at 21:30

## 2023-03-26 RX ADMIN — NYSTATIN: 100000 CREAM TOPICAL at 21:28

## 2023-03-26 RX ADMIN — PROPRANOLOL HYDROCHLORIDE 20 MG: 20 TABLET ORAL at 10:05

## 2023-03-26 RX ADMIN — DICYCLOMINE HYDROCHLORIDE 40 MG: 10 CAPSULE ORAL at 17:15

## 2023-03-26 ASSESSMENT — PAIN DESCRIPTION - LOCATION: LOCATION: BACK

## 2023-03-26 ASSESSMENT — PAIN SCALES - GENERAL
PAINLEVEL_OUTOF10: 4
PAINLEVEL_OUTOF10: 3

## 2023-03-26 NOTE — PLAN OF CARE
Problem: Discharge Planning  Goal: Discharge to home or other facility with appropriate resources  Outcome: Progressing  Flowsheets (Taken 3/25/2023 2052)  Discharge to home or other facility with appropriate resources:   Identify barriers to discharge with patient and caregiver   Arrange for needed discharge resources and transportation as appropriate     Problem: Skin/Tissue Integrity  Goal: Absence of new skin breakdown  Description: 1. Monitor for areas of redness and/or skin breakdown  2. Assess vascular access sites hourly  3. Every 4-6 hours minimum:  Change oxygen saturation probe site  4. Every 4-6 hours:  If on nasal continuous positive airway pressure, respiratory therapy assess nares and determine need for appliance change or resting period.   Outcome: Progressing     Problem: Safety - Adult  Goal: Free from fall injury  Outcome: Progressing     Problem: Pain  Goal: Verbalizes/displays adequate comfort level or baseline comfort level  Outcome: Progressing

## 2023-03-26 NOTE — PLAN OF CARE
Problem: Discharge Planning  Goal: Discharge to home or other facility with appropriate resources  3/26/2023 0609 by Kana Jama RN  Outcome: Progressing  3/26/2023 0600 by Kana Jama RN  Outcome: Progressing  Flowsheets (Taken 3/25/2023 2052)  Discharge to home or other facility with appropriate resources:   Identify barriers to discharge with patient and caregiver   Arrange for needed discharge resources and transportation as appropriate     Problem: Skin/Tissue Integrity  Goal: Absence of new skin breakdown  Description: 1. Monitor for areas of redness and/or skin breakdown  2. Assess vascular access sites hourly  3. Every 4-6 hours minimum:  Change oxygen saturation probe site  4. Every 4-6 hours:  If on nasal continuous positive airway pressure, respiratory therapy assess nares and determine need for appliance change or resting period.   3/26/2023 9720 by Kana Jama RN  Outcome: Progressing  3/26/2023 0600 by Kana Jama RN  Outcome: Progressing     Problem: Safety - Adult  Goal: Free from fall injury  3/26/2023 0609 by Kana Jama RN  Outcome: Progressing  3/26/2023 0600 by Kana Jama RN  Outcome: Progressing     Problem: Pain  Goal: Verbalizes/displays adequate comfort level or baseline comfort level  3/26/2023 0609 by Kana Jama RN  Outcome: Progressing  3/26/2023 0600 by Kana Jama RN  Outcome: Progressing

## 2023-03-26 NOTE — PLAN OF CARE
Problem: Discharge Planning  Goal: Discharge to home or other facility with appropriate resources  3/26/2023 1834 by Lata Rich RN  Outcome: Progressing  3/26/2023 0609 by Eder Funez RN  Outcome: Progressing  3/26/2023 0600 by Eder Funez RN  Outcome: Progressing  Flowsheets (Taken 3/25/2023 2052)  Discharge to home or other facility with appropriate resources:   Identify barriers to discharge with patient and caregiver   Arrange for needed discharge resources and transportation as appropriate     Problem: Skin/Tissue Integrity  Goal: Absence of new skin breakdown  Description: 1. Monitor for areas of redness and/or skin breakdown  2. Assess vascular access sites hourly  3. Every 4-6 hours minimum:  Change oxygen saturation probe site  4. Every 4-6 hours:  If on nasal continuous positive airway pressure, respiratory therapy assess nares and determine need for appliance change or resting period.   3/26/2023 1834 by Lata Rich RN  Outcome: Progressing  3/26/2023 0609 by Eder Funez RN  Outcome: Progressing  3/26/2023 0600 by Eder Funez RN  Outcome: Progressing     Problem: Safety - Adult  Goal: Free from fall injury  3/26/2023 1834 by Lata Rich RN  Outcome: Progressing  3/26/2023 0609 by Eder Funez RN  Outcome: Progressing  3/26/2023 0600 by Eder Funez RN  Outcome: Progressing     Problem: Pain  Goal: Verbalizes/displays adequate comfort level or baseline comfort level  3/26/2023 1834 by Lata Rich RN  Outcome: Progressing  3/26/2023 0609 by Eder Funez RN  Outcome: Progressing  3/26/2023 0600 by Eder Funez RN  Outcome: Progressing

## 2023-03-27 ENCOUNTER — ANESTHESIA (OUTPATIENT)
Dept: OPERATING ROOM | Age: 68
End: 2023-03-27
Payer: MEDICARE

## 2023-03-27 ENCOUNTER — ANESTHESIA EVENT (OUTPATIENT)
Dept: OPERATING ROOM | Age: 68
End: 2023-03-27
Payer: MEDICARE

## 2023-03-27 PROBLEM — I82.402 ACUTE DEEP VEIN THROMBOSIS (DVT) OF LEFT LOWER EXTREMITY (HCC): Status: ACTIVE | Noted: 2023-03-12

## 2023-03-27 PROBLEM — I48.91 ATRIAL FIBRILLATION WITH CONTROLLED VENTRICULAR RESPONSE (HCC): Status: ACTIVE | Noted: 2023-03-27

## 2023-03-27 PROBLEM — E66.01 MORBID OBESITY (HCC): Status: ACTIVE | Noted: 2021-09-21

## 2023-03-27 PROBLEM — I48.0 PAROXYSMAL ATRIAL FIBRILLATION (HCC): Status: ACTIVE | Noted: 2023-03-27

## 2023-03-27 PROBLEM — G47.33 OBSTRUCTIVE SLEEP APNEA: Status: ACTIVE | Noted: 2023-03-27

## 2023-03-27 PROBLEM — G95.19: Status: ACTIVE | Noted: 2023-03-27

## 2023-03-27 PROBLEM — U07.1 COVID-19 VIRUS INFECTION: Status: ACTIVE | Noted: 2023-03-27

## 2023-03-27 PROBLEM — N18.32 STAGE 3B CHRONIC KIDNEY DISEASE (HCC): Status: ACTIVE | Noted: 2023-03-27

## 2023-03-27 PROBLEM — E78.00 PURE HYPERCHOLESTEROLEMIA: Status: ACTIVE | Noted: 2023-03-27

## 2023-03-27 LAB
ANION GAP SERPL CALCULATED.3IONS-SCNC: 15 MMOL/L (ref 7–16)
BASOPHILS # BLD: 0 E9/L (ref 0–0.2)
BASOPHILS NFR BLD: 0 % (ref 0–2)
BUN SERPL-MCNC: 63 MG/DL (ref 6–23)
CALCIUM SERPL-MCNC: 8.7 MG/DL (ref 8.6–10.2)
CHLORIDE SERPL-SCNC: 109 MMOL/L (ref 98–107)
CO2 SERPL-SCNC: 20 MMOL/L (ref 22–29)
CREAT SERPL-MCNC: 2.4 MG/DL (ref 0.7–1.2)
EKG ATRIAL RATE: 92 BPM
EKG Q-T INTERVAL: 340 MS
EKG QRS DURATION: 90 MS
EKG QTC CALCULATION (BAZETT): 379 MS
EKG R AXIS: 51 DEGREES
EKG T AXIS: -138 DEGREES
EKG VENTRICULAR RATE: 75 BPM
EOSINOPHIL # BLD: 0 E9/L (ref 0.05–0.5)
EOSINOPHIL NFR BLD: 0 % (ref 0–6)
ERYTHROCYTE [DISTWIDTH] IN BLOOD BY AUTOMATED COUNT: 15.5 FL (ref 11.5–15)
GLUCOSE SERPL-MCNC: 133 MG/DL (ref 74–99)
HCT VFR BLD AUTO: 42.3 % (ref 37–54)
HGB BLD-MCNC: 13 G/DL (ref 12.5–16.5)
LYMPHOCYTES # BLD: 0.19 E9/L (ref 1.5–4)
LYMPHOCYTES NFR BLD: 1.7 % (ref 20–42)
MCH RBC QN AUTO: 28 PG (ref 26–35)
MCHC RBC AUTO-ENTMCNC: 30.7 % (ref 32–34.5)
MCV RBC AUTO: 91.2 FL (ref 80–99.9)
MONOCYTES # BLD: 0.46 E9/L (ref 0.1–0.95)
MONOCYTES NFR BLD: 5.2 % (ref 2–12)
NEUTROPHILS # BLD: 8.65 E9/L (ref 1.8–7.3)
NEUTS SEG NFR BLD: 93.1 % (ref 43–80)
OVALOCYTES: ABNORMAL
PLATELET # BLD AUTO: 215 E9/L (ref 130–450)
PMV BLD AUTO: 11.7 FL (ref 7–12)
POIKILOCYTES: ABNORMAL
POLYCHROMASIA: ABNORMAL
POTASSIUM SERPL-SCNC: 4.4 MMOL/L (ref 3.5–5)
RBC # BLD AUTO: 4.64 E12/L (ref 3.8–5.8)
SODIUM SERPL-SCNC: 144 MMOL/L (ref 132–146)
TSH SERPL-MCNC: 0.54 UIU/ML (ref 0.27–4.2)
WBC # BLD: 9.3 E9/L (ref 4.5–11.5)

## 2023-03-27 PROCEDURE — 2709999900 HC NON-CHARGEABLE SUPPLY: Performed by: SURGERY

## 2023-03-27 PROCEDURE — 6360000002 HC RX W HCPCS

## 2023-03-27 PROCEDURE — 36415 COLL VENOUS BLD VENIPUNCTURE: CPT

## 2023-03-27 PROCEDURE — 80048 BASIC METABOLIC PNL TOTAL CA: CPT

## 2023-03-27 PROCEDURE — 2500000003 HC RX 250 WO HCPCS: Performed by: SURGERY

## 2023-03-27 PROCEDURE — 6370000000 HC RX 637 (ALT 250 FOR IP): Performed by: SURGERY

## 2023-03-27 PROCEDURE — APPSS60 APP SPLIT SHARED TIME 46-60 MINUTES: Performed by: CLINICAL NURSE SPECIALIST

## 2023-03-27 PROCEDURE — 37191 INS ENDOVAS VENA CAVA FILTR: CPT | Performed by: SURGERY

## 2023-03-27 PROCEDURE — 06H03DZ INSERTION OF INTRALUMINAL DEVICE INTO INFERIOR VENA CAVA, PERCUTANEOUS APPROACH: ICD-10-PCS | Performed by: SURGERY

## 2023-03-27 PROCEDURE — C1880 VENA CAVA FILTER: HCPCS | Performed by: SURGERY

## 2023-03-27 PROCEDURE — 2580000003 HC RX 258: Performed by: FAMILY MEDICINE

## 2023-03-27 PROCEDURE — C1894 INTRO/SHEATH, NON-LASER: HCPCS | Performed by: SURGERY

## 2023-03-27 PROCEDURE — 85025 COMPLETE CBC W/AUTO DIFF WBC: CPT

## 2023-03-27 PROCEDURE — 2060000000 HC ICU INTERMEDIATE R&B

## 2023-03-27 PROCEDURE — 3600000002 HC SURGERY LEVEL 2 BASE: Performed by: SURGERY

## 2023-03-27 PROCEDURE — 3600000012 HC SURGERY LEVEL 2 ADDTL 15MIN: Performed by: SURGERY

## 2023-03-27 PROCEDURE — 6360000002 HC RX W HCPCS: Performed by: INTERNAL MEDICINE

## 2023-03-27 PROCEDURE — 99232 SBSQ HOSP IP/OBS MODERATE 35: CPT | Performed by: NEUROLOGICAL SURGERY

## 2023-03-27 PROCEDURE — 3700000000 HC ANESTHESIA ATTENDED CARE: Performed by: SURGERY

## 2023-03-27 PROCEDURE — 93010 ELECTROCARDIOGRAM REPORT: CPT | Performed by: INTERNAL MEDICINE

## 2023-03-27 PROCEDURE — 6360000002 HC RX W HCPCS: Performed by: SURGERY

## 2023-03-27 PROCEDURE — 93005 ELECTROCARDIOGRAM TRACING: CPT | Performed by: CLINICAL NURSE SPECIALIST

## 2023-03-27 PROCEDURE — 2580000003 HC RX 258

## 2023-03-27 PROCEDURE — 2580000003 HC RX 258: Performed by: SURGERY

## 2023-03-27 PROCEDURE — 3700000001 HC ADD 15 MINUTES (ANESTHESIA): Performed by: SURGERY

## 2023-03-27 PROCEDURE — 99223 1ST HOSP IP/OBS HIGH 75: CPT | Performed by: INTERNAL MEDICINE

## 2023-03-27 PROCEDURE — C1769 GUIDE WIRE: HCPCS | Performed by: SURGERY

## 2023-03-27 DEVICE — FILTER VASC L50MM DIA30MM INTRO 7FR L65CM VENA CAVA FEM W: Type: IMPLANTABLE DEVICE | Site: GROIN | Status: FUNCTIONAL

## 2023-03-27 RX ORDER — CEFAZOLIN SODIUM 1 G/3ML
INJECTION, POWDER, FOR SOLUTION INTRAMUSCULAR; INTRAVENOUS PRN
Status: DISCONTINUED | OUTPATIENT
Start: 2023-03-27 | End: 2023-03-27 | Stop reason: SDUPTHER

## 2023-03-27 RX ORDER — FENTANYL CITRATE 50 UG/ML
50 INJECTION, SOLUTION INTRAMUSCULAR; INTRAVENOUS EVERY 5 MIN PRN
Status: CANCELLED | OUTPATIENT
Start: 2023-03-27

## 2023-03-27 RX ORDER — SODIUM CHLORIDE 9 MG/ML
INJECTION, SOLUTION INTRAVENOUS PRN
Status: CANCELLED | OUTPATIENT
Start: 2023-03-27

## 2023-03-27 RX ORDER — SODIUM CHLORIDE 0.9 % (FLUSH) 0.9 %
5-40 SYRINGE (ML) INJECTION EVERY 12 HOURS SCHEDULED
Status: CANCELLED | OUTPATIENT
Start: 2023-03-27

## 2023-03-27 RX ORDER — ONDANSETRON 2 MG/ML
4 INJECTION INTRAMUSCULAR; INTRAVENOUS
Status: CANCELLED | OUTPATIENT
Start: 2023-03-27 | End: 2023-03-28

## 2023-03-27 RX ORDER — FENTANYL CITRATE 50 UG/ML
INJECTION, SOLUTION INTRAMUSCULAR; INTRAVENOUS PRN
Status: DISCONTINUED | OUTPATIENT
Start: 2023-03-27 | End: 2023-03-27 | Stop reason: SDUPTHER

## 2023-03-27 RX ORDER — PROPOFOL 10 MG/ML
INJECTION, EMULSION INTRAVENOUS CONTINUOUS PRN
Status: DISCONTINUED | OUTPATIENT
Start: 2023-03-27 | End: 2023-03-27 | Stop reason: SDUPTHER

## 2023-03-27 RX ORDER — FENTANYL CITRATE 50 UG/ML
25 INJECTION, SOLUTION INTRAMUSCULAR; INTRAVENOUS EVERY 5 MIN PRN
Status: CANCELLED | OUTPATIENT
Start: 2023-03-27

## 2023-03-27 RX ORDER — LIDOCAINE HYDROCHLORIDE 20 MG/ML
INJECTION, SOLUTION INTRAVENOUS PRN
Status: DISCONTINUED | OUTPATIENT
Start: 2023-03-27 | End: 2023-03-27 | Stop reason: SDUPTHER

## 2023-03-27 RX ORDER — SODIUM CHLORIDE 0.9 % (FLUSH) 0.9 %
5-40 SYRINGE (ML) INJECTION PRN
Status: CANCELLED | OUTPATIENT
Start: 2023-03-27

## 2023-03-27 RX ORDER — SODIUM CHLORIDE 9 MG/ML
INJECTION, SOLUTION INTRAVENOUS CONTINUOUS PRN
Status: DISCONTINUED | OUTPATIENT
Start: 2023-03-27 | End: 2023-03-27 | Stop reason: SDUPTHER

## 2023-03-27 RX ADMIN — CEFAZOLIN 2 G: 1 INJECTION, POWDER, FOR SOLUTION INTRAMUSCULAR; INTRAVENOUS at 16:51

## 2023-03-27 RX ADMIN — ACETAMINOPHEN 650 MG: 325 TABLET ORAL at 21:41

## 2023-03-27 RX ADMIN — SODIUM CHLORIDE, PRESERVATIVE FREE 10 ML: 5 INJECTION INTRAVENOUS at 21:43

## 2023-03-27 RX ADMIN — DOCUSATE SODIUM 100 MG: 100 CAPSULE, LIQUID FILLED ORAL at 18:44

## 2023-03-27 RX ADMIN — ALLOPURINOL 100 MG: 100 TABLET ORAL at 18:52

## 2023-03-27 RX ADMIN — HYDROCHLOROTHIAZIDE 25 MG: 25 TABLET ORAL at 18:45

## 2023-03-27 RX ADMIN — SODIUM CHLORIDE: 9 INJECTION, SOLUTION INTRAVENOUS at 16:31

## 2023-03-27 RX ADMIN — SODIUM CHLORIDE, PRESERVATIVE FREE 10 ML: 5 INJECTION INTRAVENOUS at 18:45

## 2023-03-27 RX ADMIN — NYSTATIN: 100000 CREAM TOPICAL at 21:42

## 2023-03-27 RX ADMIN — FENTANYL CITRATE 50 MCG: 50 INJECTION, SOLUTION INTRAMUSCULAR; INTRAVENOUS at 16:50

## 2023-03-27 RX ADMIN — DICYCLOMINE HYDROCHLORIDE 40 MG: 10 CAPSULE ORAL at 21:41

## 2023-03-27 RX ADMIN — GABAPENTIN 300 MG: 300 CAPSULE ORAL at 21:41

## 2023-03-27 RX ADMIN — DEXAMETHASONE SODIUM PHOSPHATE 2 MG: 4 INJECTION, SOLUTION INTRAMUSCULAR; INTRAVENOUS at 18:45

## 2023-03-27 RX ADMIN — SODIUM CHLORIDE, PRESERVATIVE FREE 10 ML: 5 INJECTION INTRAVENOUS at 01:26

## 2023-03-27 RX ADMIN — TOPIRAMATE 25 MG: 25 TABLET, FILM COATED ORAL at 18:44

## 2023-03-27 RX ADMIN — COLCHICINE 0.6 MG: 0.6 TABLET ORAL at 18:47

## 2023-03-27 RX ADMIN — GABAPENTIN 300 MG: 300 CAPSULE ORAL at 18:45

## 2023-03-27 RX ADMIN — NYSTATIN: 100000 CREAM TOPICAL at 18:48

## 2023-03-27 RX ADMIN — Medication 2000 UNITS: at 18:44

## 2023-03-27 RX ADMIN — PROPOFOL 75 MCG/KG/MIN: 10 INJECTION, EMULSION INTRAVENOUS at 16:45

## 2023-03-27 RX ADMIN — DEXAMETHASONE SODIUM PHOSPHATE 2 MG: 4 INJECTION, SOLUTION INTRAMUSCULAR; INTRAVENOUS at 01:26

## 2023-03-27 RX ADMIN — LIDOCAINE HYDROCHLORIDE 20 MG: 20 INJECTION, SOLUTION INTRAVENOUS at 16:45

## 2023-03-27 ASSESSMENT — PAIN DESCRIPTION - DESCRIPTORS: DESCRIPTORS: ACHING

## 2023-03-27 ASSESSMENT — PAIN SCALES - GENERAL
PAINLEVEL_OUTOF10: 0
PAINLEVEL_OUTOF10: 1
PAINLEVEL_OUTOF10: 0
PAINLEVEL_OUTOF10: 4

## 2023-03-27 ASSESSMENT — ENCOUNTER SYMPTOMS: SHORTNESS OF BREATH: 1

## 2023-03-27 ASSESSMENT — LIFESTYLE VARIABLES: SMOKING_STATUS: 0

## 2023-03-27 ASSESSMENT — PAIN DESCRIPTION - LOCATION: LOCATION: BACK

## 2023-03-27 NOTE — ANESTHESIA PRE PROCEDURE
Department of Anesthesiology  Preprocedure Note       Name:  Kristi Rae   Age:  74 y.o.  :  1955                                          MRN:  [id number]         Date:  3/27/2023      Surgeon: Anyi Signs):  Melisa Ignacio MD    Procedure: Procedure(s):  VENA CAVA FILTER INSERTION (TO FOLLOW)    Medications prior to admission:   Prior to Admission medications    Medication Sig Start Date End Date Taking? Authorizing Provider   benazepril (LOTENSIN) 40 MG tablet Take 0.5 tablets by mouth daily 3/15/23 5/14/23  Millard Severe, MD   allopurinol (ZYLOPRIM) 100 MG tablet Take 1 tablet by mouth daily 3/20/23   Millard Severe, MD   colchicine (COLCRYS) 0.6 MG tablet Take 1 tablet by mouth every evening 3/20/23   Millard Severe, MD   apixaban starter pack (ELIQUIS DVT/PE STARTER PACK) 5 MG TBPK tablet Take 1 tablet by mouth See Admin Instructions 3/15/23   Millard Severe, MD   propranolol (INDERAL) 40 MG tablet Take 40 mg by mouth every evening    Historical Provider, MD   hydroCHLOROthiazide (HYDRODIURIL) 25 MG tablet Take 25 mg by mouth every evening    Historical Provider, MD   docusate sodium (COLACE) 100 MG capsule Take 100 mg by mouth every evening     Historical Provider, MD   dicyclomine (BENTYL) 20 MG tablet Take 40 mg by mouth 3 times daily     Historical Provider, MD   gabapentin (NEURONTIN) 300 MG capsule Take 300 mg by mouth 3 times daily as needed (for sciatica or back pain.). Instructed to take with sip water am of procedure, if needed.     Historical Provider, MD   amLODIPine (NORVASC) 10 MG tablet Take 10 mg by mouth every evening     Historical Provider, MD   topiramate (TOPAMAX) 25 MG tablet Take 25 mg by mouth every evening     Historical Provider, MD   SUMAtriptan (IMITREX) 25 MG tablet Take 25 mg by mouth 2 times daily as needed for Migraine    Historical Provider, MD   potassium chloride (KLOR-CON M) 20 MEQ extended release tablet Take 20 mEq by mouth every evening

## 2023-03-27 NOTE — ACP (ADVANCE CARE PLANNING)
Advance Care Planning   Healthcare Decision Maker:    Primary Decision Maker: Bruna Steven - [facility] - [phone]    Click here to complete Healthcare Decision Makers including selection of the Healthcare Decision Maker Relationship (ie \"Primary\").

## 2023-03-27 NOTE — OP NOTE
Operative Note      Patient: Jaz Velasco  YOB: 1955  MRN: [id number]    Date of Procedure: 3/27/2023    Pre-Op Diagnosis: Acute left femoral devious thrombosis. Post-Op Diagnosis: Same       Procedure(s):  VENA CAVA FILTER INSERTION (TO FOLLOW)    Surgeon(s):  Franklin Monson MD    Assistant:   * No surgical staff found *    Anesthesia: General    Estimated Blood Loss (mL): Minimal    Complications: None    Specimens:   * No specimens in log *    Implants:  * No implants in log *      Drains: * No LDAs found *    Findings:     Detailed Description of Procedure: The patient was identified and the procedure confirmed. The groin regions were prepped and draped in the usual sterile fashion. Lidocaine 1% mixed with marcaine 0.25% were used for local anesthesia. The right common femoral vein was percutaneously entered and a wire was advanced into the inferior vena cava under fluoroscopic guidance. A small incision was made around the wire. The dilator was passed over the wire followed by the introducer, which was advanced into the inferior vena cava. The filter was advanced through the introducer then positioned and deployed at the level of the 3rd vertebra. The filter deployed properly. The introducer was removed and a Monocryl suture was placed around the puncture site for hemostasis. Needle, sponge, and instrument counts were reported as correct. The patient tolerated the procedure and was transferred to the recovery area in satisfactory condition.     CPT: 12656     Electronically signed by Franklin Monson MD on 3/27/2023 at 5:09 PM

## 2023-03-27 NOTE — PLAN OF CARE
Problem: Discharge Planning  Goal: Discharge to home or other facility with appropriate resources  3/27/2023 0421 by Yesenia Cotter RN  Outcome: Progressing  Flowsheets (Taken 3/26/2023 2115)  Discharge to home or other facility with appropriate resources: Identify barriers to discharge with patient and caregiver  3/26/2023 1834 by Isai Caballero RN  Outcome: Progressing     Problem: Skin/Tissue Integrity  Goal: Absence of new skin breakdown  Description: 1. Monitor for areas of redness and/or skin breakdown  2. Assess vascular access sites hourly  3. Every 4-6 hours minimum:  Change oxygen saturation probe site  4. Every 4-6 hours:  If on nasal continuous positive airway pressure, respiratory therapy assess nares and determine need for appliance change or resting period.   3/27/2023 0421 by Yesenia Cotter RN  Outcome: Progressing  3/26/2023 1834 by Isai Caballero RN  Outcome: Progressing     Problem: Safety - Adult  Goal: Free from fall injury  3/27/2023 0421 by Yesenia Cotter RN  Outcome: Progressing  3/26/2023 1834 by Isai Caballero RN  Outcome: Progressing     Problem: Pain  Goal: Verbalizes/displays adequate comfort level or baseline comfort level  3/27/2023 0421 by Yesenia Cotter RN  Outcome: Progressing  3/26/2023 1834 by Isai Caballero RN  Outcome: Progressing

## 2023-03-28 ENCOUNTER — APPOINTMENT (OUTPATIENT)
Dept: MRI IMAGING | Age: 68
DRG: 091 | End: 2023-03-28
Attending: STUDENT IN AN ORGANIZED HEALTH CARE EDUCATION/TRAINING PROGRAM
Payer: MEDICARE

## 2023-03-28 LAB
ANION GAP SERPL CALCULATED.3IONS-SCNC: 12 MMOL/L (ref 7–16)
ANION GAP SERPL CALCULATED.3IONS-SCNC: 14 MMOL/L (ref 7–16)
ANISOCYTOSIS: ABNORMAL
BASOPHILS # BLD: 0 E9/L (ref 0–0.2)
BASOPHILS NFR BLD: 0 % (ref 0–2)
BUN SERPL-MCNC: 60 MG/DL (ref 6–23)
BUN SERPL-MCNC: 61 MG/DL (ref 6–23)
CALCIUM SERPL-MCNC: 8.2 MG/DL (ref 8.6–10.2)
CALCIUM SERPL-MCNC: 8.5 MG/DL (ref 8.6–10.2)
CHLORIDE SERPL-SCNC: 105 MMOL/L (ref 98–107)
CHLORIDE SERPL-SCNC: 110 MMOL/L (ref 98–107)
CO2 SERPL-SCNC: 16 MMOL/L (ref 22–29)
CO2 SERPL-SCNC: 21 MMOL/L (ref 22–29)
CREAT SERPL-MCNC: 2.2 MG/DL (ref 0.7–1.2)
CREAT SERPL-MCNC: 2.2 MG/DL (ref 0.7–1.2)
EOSINOPHIL # BLD: 0 E9/L (ref 0.05–0.5)
EOSINOPHIL NFR BLD: 0 % (ref 0–6)
ERYTHROCYTE [DISTWIDTH] IN BLOOD BY AUTOMATED COUNT: 15.7 FL (ref 11.5–15)
GLUCOSE SERPL-MCNC: 107 MG/DL (ref 74–99)
GLUCOSE SERPL-MCNC: 121 MG/DL (ref 74–99)
HCT VFR BLD AUTO: 41.6 % (ref 37–54)
HGB BLD-MCNC: 12.1 G/DL (ref 12.5–16.5)
IMM GRANULOCYTES # BLD: 0.04 E9/L
IMM GRANULOCYTES NFR BLD: 0.7 % (ref 0–5)
LYMPHOCYTES # BLD: 0.32 E9/L (ref 1.5–4)
LYMPHOCYTES NFR BLD: 5.3 % (ref 20–42)
MCH RBC QN AUTO: 28.5 PG (ref 26–35)
MCHC RBC AUTO-ENTMCNC: 29.1 % (ref 32–34.5)
MCV RBC AUTO: 98.1 FL (ref 80–99.9)
MONOCYTES # BLD: 0.3 E9/L (ref 0.1–0.95)
MONOCYTES NFR BLD: 5 % (ref 2–12)
NEUTROPHILS # BLD: 5.37 E9/L (ref 1.8–7.3)
NEUTS SEG NFR BLD: 89 % (ref 43–80)
OVALOCYTES: ABNORMAL
PLATELET # BLD AUTO: 175 E9/L (ref 130–450)
PMV BLD AUTO: 11.8 FL (ref 7–12)
POIKILOCYTES: ABNORMAL
POTASSIUM SERPL-SCNC: 3.9 MMOL/L (ref 3.5–5)
POTASSIUM SERPL-SCNC: 4.4 MMOL/L (ref 3.5–5)
RBC # BLD AUTO: 4.24 E12/L (ref 3.8–5.8)
SODIUM SERPL-SCNC: 138 MMOL/L (ref 132–146)
SODIUM SERPL-SCNC: 140 MMOL/L (ref 132–146)
WBC # BLD: 6 E9/L (ref 4.5–11.5)

## 2023-03-28 PROCEDURE — 2580000003 HC RX 258: Performed by: SURGERY

## 2023-03-28 PROCEDURE — 99233 SBSQ HOSP IP/OBS HIGH 50: CPT | Performed by: INTERNAL MEDICINE

## 2023-03-28 PROCEDURE — 97530 THERAPEUTIC ACTIVITIES: CPT

## 2023-03-28 PROCEDURE — 80048 BASIC METABOLIC PNL TOTAL CA: CPT

## 2023-03-28 PROCEDURE — 6370000000 HC RX 637 (ALT 250 FOR IP): Performed by: SURGERY

## 2023-03-28 PROCEDURE — 6360000002 HC RX W HCPCS: Performed by: SURGERY

## 2023-03-28 PROCEDURE — 36415 COLL VENOUS BLD VENIPUNCTURE: CPT

## 2023-03-28 PROCEDURE — 97161 PT EVAL LOW COMPLEX 20 MIN: CPT

## 2023-03-28 PROCEDURE — 6370000000 HC RX 637 (ALT 250 FOR IP): Performed by: INTERNAL MEDICINE

## 2023-03-28 PROCEDURE — 72148 MRI LUMBAR SPINE W/O DYE: CPT

## 2023-03-28 PROCEDURE — 2060000000 HC ICU INTERMEDIATE R&B

## 2023-03-28 PROCEDURE — 2580000003 HC RX 258: Performed by: FAMILY MEDICINE

## 2023-03-28 PROCEDURE — 2580000003 HC RX 258: Performed by: INTERNAL MEDICINE

## 2023-03-28 PROCEDURE — 97535 SELF CARE MNGMENT TRAINING: CPT

## 2023-03-28 PROCEDURE — 85025 COMPLETE CBC W/AUTO DIFF WBC: CPT

## 2023-03-28 PROCEDURE — 97165 OT EVAL LOW COMPLEX 30 MIN: CPT

## 2023-03-28 RX ORDER — 0.9 % SODIUM CHLORIDE 0.9 %
1000 INTRAVENOUS SOLUTION INTRAVENOUS ONCE
Status: COMPLETED | OUTPATIENT
Start: 2023-03-28 | End: 2023-03-28

## 2023-03-28 RX ORDER — LORAZEPAM 1 MG/1
1 TABLET ORAL
Status: COMPLETED | OUTPATIENT
Start: 2023-03-28 | End: 2023-03-28

## 2023-03-28 RX ADMIN — DICYCLOMINE HYDROCHLORIDE 40 MG: 10 CAPSULE ORAL at 20:30

## 2023-03-28 RX ADMIN — SODIUM CHLORIDE 1000 ML: 9 INJECTION, SOLUTION INTRAVENOUS at 00:31

## 2023-03-28 RX ADMIN — NYSTATIN: 100000 CREAM TOPICAL at 10:22

## 2023-03-28 RX ADMIN — GABAPENTIN 300 MG: 300 CAPSULE ORAL at 10:20

## 2023-03-28 RX ADMIN — TOPIRAMATE 25 MG: 25 TABLET, FILM COATED ORAL at 17:49

## 2023-03-28 RX ADMIN — SODIUM CHLORIDE 1000 ML: 9 INJECTION, SOLUTION INTRAVENOUS at 03:58

## 2023-03-28 RX ADMIN — DICYCLOMINE HYDROCHLORIDE 40 MG: 10 CAPSULE ORAL at 10:19

## 2023-03-28 RX ADMIN — DOCUSATE SODIUM 100 MG: 100 CAPSULE, LIQUID FILLED ORAL at 17:49

## 2023-03-28 RX ADMIN — ALLOPURINOL 100 MG: 100 TABLET ORAL at 15:21

## 2023-03-28 RX ADMIN — NYSTATIN: 100000 CREAM TOPICAL at 20:36

## 2023-03-28 RX ADMIN — SODIUM CHLORIDE, PRESERVATIVE FREE 10 ML: 5 INJECTION INTRAVENOUS at 20:33

## 2023-03-28 RX ADMIN — LORAZEPAM 1 MG: 1 TABLET ORAL at 20:33

## 2023-03-28 RX ADMIN — Medication 2000 UNITS: at 17:49

## 2023-03-28 RX ADMIN — DEXAMETHASONE SODIUM PHOSPHATE 2 MG: 4 INJECTION, SOLUTION INTRAMUSCULAR; INTRAVENOUS at 15:21

## 2023-03-28 RX ADMIN — SODIUM CHLORIDE, PRESERVATIVE FREE 10 ML: 5 INJECTION INTRAVENOUS at 15:22

## 2023-03-28 RX ADMIN — DEXAMETHASONE SODIUM PHOSPHATE 2 MG: 4 INJECTION, SOLUTION INTRAMUSCULAR; INTRAVENOUS at 01:13

## 2023-03-28 RX ADMIN — COLCHICINE 0.6 MG: 0.6 TABLET ORAL at 17:49

## 2023-03-28 ASSESSMENT — PAIN SCALES - GENERAL
PAINLEVEL_OUTOF10: 0
PAINLEVEL_OUTOF10: 0
PAINLEVEL_OUTOF10: 3
PAINLEVEL_OUTOF10: 4

## 2023-03-28 ASSESSMENT — PAIN DESCRIPTION - LOCATION
LOCATION: LEG
LOCATION: LEG

## 2023-03-28 ASSESSMENT — PAIN DESCRIPTION - ORIENTATION
ORIENTATION: RIGHT
ORIENTATION: LEFT

## 2023-03-28 ASSESSMENT — PAIN DESCRIPTION - DESCRIPTORS
DESCRIPTORS: ACHING;DISCOMFORT;SORE
DESCRIPTORS: ACHING;DISCOMFORT;DULL

## 2023-03-28 ASSESSMENT — PAIN - FUNCTIONAL ASSESSMENT: PAIN_FUNCTIONAL_ASSESSMENT: PREVENTS OR INTERFERES SOME ACTIVE ACTIVITIES AND ADLS

## 2023-03-28 NOTE — ANESTHESIA POSTPROCEDURE EVALUATION
Department of Anesthesiology  Postprocedure Note    Patient: Osorio Mike  MRN: [id number]  YOB: 1955  Date of evaluation: 3/28/2023      Procedure Summary     Date: 03/27/23 Room / Location: [facility] 03 / [facility]    Anesthesia Start: 9789 Anesthesia Stop: 1735    Procedure: VENA CAVA FILTER INSERTION (Right: Groin) Diagnosis:       Deep vein thrombosis (DVT) of proximal lower extremity, unspecified chronicity, unspecified laterality ([address]      (DVT)    Surgeons: Maris Crow MD Responsible Provider: Michael Douglass DO    Anesthesia Type: MAC ASA Status: 4          Anesthesia Type: No value filed.     Edgar Phase I:      Edgar Phase II:        Anesthesia Post Evaluation    Patient location during evaluation: PACU  Patient participation: complete - patient participated  Level of consciousness: awake and alert  Pain score: 1  Airway patency: patent  Nausea & Vomiting: no nausea and no vomiting  Complications: no  Cardiovascular status: hemodynamically stable  Respiratory status: acceptable  Hydration status: euvolemic

## 2023-03-29 ENCOUNTER — TELEPHONE (OUTPATIENT)
Dept: CARDIOLOGY CLINIC | Age: 68
End: 2023-03-29

## 2023-03-29 LAB
ANION GAP SERPL CALCULATED.3IONS-SCNC: 13 MMOL/L (ref 7–16)
ANISOCYTOSIS: ABNORMAL
BASOPHILS # BLD: 0.01 E9/L (ref 0–0.2)
BASOPHILS NFR BLD: 0.1 % (ref 0–2)
BUN SERPL-MCNC: 64 MG/DL (ref 6–23)
CALCIUM SERPL-MCNC: 8.6 MG/DL (ref 8.6–10.2)
CHLORIDE SERPL-SCNC: 104 MMOL/L (ref 98–107)
CO2 SERPL-SCNC: 19 MMOL/L (ref 22–29)
CREAT SERPL-MCNC: 2.1 MG/DL (ref 0.7–1.2)
EOSINOPHIL # BLD: 0 E9/L (ref 0.05–0.5)
EOSINOPHIL NFR BLD: 0 % (ref 0–6)
ERYTHROCYTE [DISTWIDTH] IN BLOOD BY AUTOMATED COUNT: 15.2 FL (ref 11.5–15)
GLUCOSE SERPL-MCNC: 129 MG/DL (ref 74–99)
HCT VFR BLD AUTO: 40.7 % (ref 37–54)
HGB BLD-MCNC: 12.6 G/DL (ref 12.5–16.5)
IMM GRANULOCYTES # BLD: 0.09 E9/L
IMM GRANULOCYTES NFR BLD: 1.2 % (ref 0–5)
LACTATE BLDV-SCNC: 1.7 MMOL/L (ref 0.5–2.2)
LYMPHOCYTES # BLD: 0.49 E9/L (ref 1.5–4)
LYMPHOCYTES NFR BLD: 6.6 % (ref 20–42)
MCH RBC QN AUTO: 28.3 PG (ref 26–35)
MCHC RBC AUTO-ENTMCNC: 31 % (ref 32–34.5)
MCV RBC AUTO: 91.3 FL (ref 80–99.9)
MONOCYTES # BLD: 0.35 E9/L (ref 0.1–0.95)
MONOCYTES NFR BLD: 4.7 % (ref 2–12)
NEUTROPHILS # BLD: 6.47 E9/L (ref 1.8–7.3)
NEUTS SEG NFR BLD: 87.4 % (ref 43–80)
OVALOCYTES: ABNORMAL
PLATELET # BLD AUTO: 225 E9/L (ref 130–450)
PMV BLD AUTO: 11.9 FL (ref 7–12)
POIKILOCYTES: ABNORMAL
POLYCHROMASIA: ABNORMAL
POTASSIUM SERPL-SCNC: 4.8 MMOL/L (ref 3.5–5)
RBC # BLD AUTO: 4.46 E12/L (ref 3.8–5.8)
SODIUM SERPL-SCNC: 136 MMOL/L (ref 132–146)
WBC # BLD: 7.4 E9/L (ref 4.5–11.5)

## 2023-03-29 PROCEDURE — 85025 COMPLETE CBC W/AUTO DIFF WBC: CPT

## 2023-03-29 PROCEDURE — 80048 BASIC METABOLIC PNL TOTAL CA: CPT

## 2023-03-29 PROCEDURE — 6370000000 HC RX 637 (ALT 250 FOR IP): Performed by: SURGERY

## 2023-03-29 PROCEDURE — 6360000002 HC RX W HCPCS: Performed by: SURGERY

## 2023-03-29 PROCEDURE — 6370000000 HC RX 637 (ALT 250 FOR IP): Performed by: INTERNAL MEDICINE

## 2023-03-29 PROCEDURE — 99233 SBSQ HOSP IP/OBS HIGH 50: CPT | Performed by: INTERNAL MEDICINE

## 2023-03-29 PROCEDURE — 2060000000 HC ICU INTERMEDIATE R&B

## 2023-03-29 PROCEDURE — 2580000003 HC RX 258: Performed by: SURGERY

## 2023-03-29 PROCEDURE — 36415 COLL VENOUS BLD VENIPUNCTURE: CPT

## 2023-03-29 PROCEDURE — 83605 ASSAY OF LACTIC ACID: CPT

## 2023-03-29 RX ORDER — METOPROLOL SUCCINATE 25 MG/1
25 TABLET, EXTENDED RELEASE ORAL DAILY
Status: DISCONTINUED | OUTPATIENT
Start: 2023-03-29 | End: 2023-03-30 | Stop reason: HOSPADM

## 2023-03-29 RX ORDER — SODIUM BICARBONATE 650 MG/1
650 TABLET ORAL 2 TIMES DAILY
Status: DISCONTINUED | OUTPATIENT
Start: 2023-03-29 | End: 2023-03-30 | Stop reason: HOSPADM

## 2023-03-29 RX ADMIN — ALLOPURINOL 100 MG: 100 TABLET ORAL at 09:45

## 2023-03-29 RX ADMIN — NYSTATIN: 100000 CREAM TOPICAL at 11:45

## 2023-03-29 RX ADMIN — DOCUSATE SODIUM 100 MG: 100 CAPSULE, LIQUID FILLED ORAL at 17:07

## 2023-03-29 RX ADMIN — DICYCLOMINE HYDROCHLORIDE 40 MG: 10 CAPSULE ORAL at 09:44

## 2023-03-29 RX ADMIN — TOPIRAMATE 25 MG: 25 TABLET, FILM COATED ORAL at 17:08

## 2023-03-29 RX ADMIN — METOPROLOL SUCCINATE 25 MG: 25 TABLET, EXTENDED RELEASE ORAL at 09:44

## 2023-03-29 RX ADMIN — DEXAMETHASONE SODIUM PHOSPHATE 2 MG: 4 INJECTION, SOLUTION INTRAMUSCULAR; INTRAVENOUS at 17:08

## 2023-03-29 RX ADMIN — DICYCLOMINE HYDROCHLORIDE 40 MG: 10 CAPSULE ORAL at 21:20

## 2023-03-29 RX ADMIN — COLCHICINE 0.6 MG: 0.6 TABLET ORAL at 17:09

## 2023-03-29 RX ADMIN — SODIUM BICARBONATE 650 MG: 650 TABLET ORAL at 21:20

## 2023-03-29 RX ADMIN — SODIUM CHLORIDE, PRESERVATIVE FREE 10 ML: 5 INJECTION INTRAVENOUS at 21:21

## 2023-03-29 RX ADMIN — Medication 2000 UNITS: at 17:08

## 2023-03-29 RX ADMIN — NYSTATIN: 100000 CREAM TOPICAL at 21:21

## 2023-03-29 RX ADMIN — GABAPENTIN 300 MG: 300 CAPSULE ORAL at 09:45

## 2023-03-29 RX ADMIN — DEXAMETHASONE SODIUM PHOSPHATE 2 MG: 4 INJECTION, SOLUTION INTRAMUSCULAR; INTRAVENOUS at 01:29

## 2023-03-29 RX ADMIN — SODIUM CHLORIDE, PRESERVATIVE FREE 10 ML: 5 INJECTION INTRAVENOUS at 09:47

## 2023-03-29 ASSESSMENT — PAIN DESCRIPTION - LOCATION
LOCATION: LEG
LOCATION: FOOT

## 2023-03-29 ASSESSMENT — PAIN DESCRIPTION - ORIENTATION
ORIENTATION: RIGHT
ORIENTATION: RIGHT;LEFT

## 2023-03-29 ASSESSMENT — PAIN SCALES - GENERAL
PAINLEVEL_OUTOF10: 1
PAINLEVEL_OUTOF10: 3
PAINLEVEL_OUTOF10: 2

## 2023-03-29 ASSESSMENT — PAIN DESCRIPTION - DESCRIPTORS
DESCRIPTORS: DISCOMFORT
DESCRIPTORS: ACHING;TENDER;SORE

## 2023-03-29 NOTE — TELEPHONE ENCOUNTER
----- Message from Luna Palomo MD sent at 3/29/2023  9:32 AM EDT -----  This is a patient evaluated during hospitalization with multiple issues including that of new onset paroxysmal atrial fibrillation in the face of a spinal hematoma precluding anticoagulation as well as that of an incidentally noted COVID-19 infection.   Please schedule an echocardiogram for atrial fibrillation approximately 2 weeks or as soon as available appointment and subsequent follow-up with me

## 2023-03-29 NOTE — CONSULTS
Nephrology Consult  [facility]  Binh Rey MD    CC:   metabolic acidosis    HPI:   the pt is a 75 yo male with a pmh of ckd 4,  htn, migraines, htn, afib, gout who is was called to see at 2pm for metabolic acidosis to see asap so he can be discharged. Labs show na 136, k 4, co2 19, bun 64, cr 2.1 from 2.4, wbc 7.4, ag 13, gb 12.6, plt 225, tsh 0.5. he is in covid isolation and history is from the chart. He was transferred here from [facility]. He was there for dvt and went home on oac. 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 Oac held and send here. He was seen by nsg. He had an ivcf ilter placed 3/27/23. His norvasc, acei, and hctz  is on hold for low bp. He was seen for new onset afib. Labs from 3/15/23 show a cr of 2.9 with gfr of 23. Avf placed in feb 2021. History is obtained from the chart due to covid isolation.      PMH:    Past Medical History:   Diagnosis Date    A-V fistula ([address]     left arm    Arthritis     Back pain 2019    had LESI    CKD (chronic kidney disease)     stage 4    HTN (hypertension)     Keratoconus     bilateral    Kidney disease     Lymphadenopathy     lower legs    Migraine     Prolonged emergence from general anesthesia        Patient Active Problem List   Diagnosis    Foreign body in esophagus    Encounter regarding vascular access for dialysis for ESRD ([address]    CKD (chronic kidney disease) stage 4, GFR 15-29 ml/min ([facility])    AVF (arteriovenous fistula) ([facility])    Atypical chest pain    Atypical migraine    Back pain    Primary hypertension    Benign hypertensive renal disease    Gouty arthropathy    Hematuria    Impaired cognition    Lymphedema    Mixed hyperlipidemia    Morbid obesity ([facility])    Sciatica    Shoulder pain    Spasm of sphincter of Oddi    Vision disorder    Vitamin D deficiency    Acute deep vein thrombosis (DVT) of left lower extremity ([facility])    Acute deep vein thrombosis (DVT) of femoral vein of left lower extremity ([address]
file   Tobacco Use    Smoking status: Never    Smokeless tobacco: Never   Vaping Use    Vaping Use: Never used   Substance and Sexual Activity    Alcohol use: Never    Drug use: Never    Sexual activity: Not on file   Other Topics Concern    Not on file   Social History Narrative    Not on file     Social Determinants of Health     Financial Resource Strain: Not on file   Food Insecurity: Not on file   Transportation Needs: Not on file   Physical Activity: Not on file   Stress: Not on file   Social Connections: Not on file   Intimate Partner Violence: Not on file   Housing Stability: Not on file       Medications:   Current Facility-Administered Medications   Medication Dose Route Frequency Provider Last Rate Last Admin    allopurinol (ZYLOPRIM) tablet 100 mg  100 mg Oral Daily Venda Slain, DO        amLODIPine (NORVASC) tablet 10 mg  10 mg Oral QPM Venda Slain, DO        lisinopril (PRINIVIL;ZESTRIL) tablet 40 mg  40 mg Oral Daily Venda Slain, DO        vitamin D (CHOLECALCIFEROL) tablet 2,000 Units  2,000 Units Oral QPM Venda Slain, DO        colchicine (COLCRYS) tablet 0.6 mg  0.6 mg Oral QPM Venda Slain, DO        dicyclomine (BENTYL) capsule 40 mg  40 mg Oral TID Venda Slain, DO        docusate sodium (COLACE) capsule 100 mg  100 mg Oral QPM Venda Slain, DO        gabapentin (NEURONTIN) capsule 300 mg  300 mg Oral TID PRN Venda Slain, DO        hydroCHLOROthiazide (HYDRODIURIL) tablet 25 mg  25 mg Oral QPM Venda Slain, DO        potassium chloride (KLOR-CON M) extended release tablet 20 mEq  20 mEq Oral QPM Venda Slain, DO        propranolol (INDERAL) tablet 40 mg  40 mg Oral QPM Venda Slain, DO        topiramate (TOPAMAX) tablet 25 mg  25 mg Oral QPM Venda Slain, DO        sodium chloride flush 0.9 % injection 10 mL  10 mL IntraVENous 2 times per day Venda Slain, DO        sodium chloride flush 0.9 % injection 10 mL  10 mL IntraVENous PRN Venda Slain, DO        0.9 % sodium chloride
100 mg, Oral, QPM  gabapentin (NEURONTIN) capsule 300 mg, 300 mg, Oral, TID PRN  hydroCHLOROthiazide (HYDRODIURIL) tablet 25 mg, 25 mg, Oral, QPM  topiramate (TOPAMAX) tablet 25 mg, 25 mg, Oral, QPM  sodium chloride flush 0.9 % injection 10 mL, 10 mL, IntraVENous, 2 times per day  sodium chloride flush 0.9 % injection 10 mL, 10 mL, IntraVENous, PRN  0.9 % sodium chloride infusion, , IntraVENous, PRN  promethazine (PHENERGAN) tablet 12.5 mg, 12.5 mg, Oral, Q6H PRN **OR** ondansetron (ZOFRAN) injection 4 mg, 4 mg, IntraVENous, Q6H PRN  polyethylene glycol (GLYCOLAX) packet 17 g, 17 g, Oral, Daily PRN  acetaminophen (TYLENOL) tablet 650 mg, 650 mg, Oral, Q6H PRN **OR** acetaminophen (TYLENOL) suppository 650 mg, 650 mg, Rectal, Q6H PRN  dexamethasone (DECADRON) injection 2 mg, 2 mg, IntraVENous, Q12H  LORazepam (ATIVAN) injection 1 mg, 1 mg, IntraVENous, See Admin Instructions  nystatin (MYCOSTATIN) cream, , Topical, BID    Allergies:  Codeine and Morphine    Social History:      Lives at home with wife in a [place] with only a few steps to enter   No assistive device  Denies ever using tobacco, marijuana or illicit drugs  No significant alcohol intake        Family History: Mother passed at age 68 from cirrhosis of the liver secondary to hepatitis B. Father passed at age 80 from complications of a myocardial infarction. Siblings are healthy. There are no children    REVIEW OF SYSTEMS:   Interviewed patient over the phone due to COVID-positive  Constitutional: Denies fatigue, fevers, chills or night sweats  Eyes: Denies visual changes or drainage  ENT: Denies headaches or hearing loss. No mouth sores or sore throat. No epistaxis   Cardiovascular: Denies chest pain, pressure or palpitations. Chronic bilateral lower extremity swelling. Left extremity DVT with hematoma  Respiratory: Denies SANDOVAL, positive cough, denies orthopnea or PND. No hemoptysis   Gastrointestinal: Denies hematemesis or anorexia.  No
Incontinence:    No [x]/Yes []    PHYSICAL EXAM:  Vitals:    03/27/23 0545   BP: (!) 112/58   Pulse: 76   Resp: 14   Temp: 98.3 °F (36.8 °C)   SpO2:      General Appearance: alert and oriented to person, place and time, in no acute distress, well developed and well- nourished  Neurologic: no cranial nerve deficit, speech normal  Head: normocephalic and atraumatic  Eyes: extraocular eye movements intact, conjunctivae normal  ENT: external ear and ear canal normal bilaterally, nose without deformity  Pulmonary/Chest: normal air movement, no respiratory distress  Cardiovascular: normal rate, regular rhythm  Abdomen: non-distended, no masses  Musculoskeletal: no joint deformity or tenderness  Extremities: left leg edema  Skin: warm and dry, no rash or erythema    PULSE EXAM      Right      Left   Brachial     Radial     Femoral     Popliteal     Dorsalis Pedis     Posterior Tibial     (3=normal, 2=diminished, 1=barely palpable, 4=widened)      LABS:    Lab Results   Component Value Date    WBC 5.8 03/25/2023    HGB 13.0 03/25/2023    HCT 39.8 03/25/2023     03/25/2023    PROTIME 11.0 04/28/2021    INR 1.0 04/28/2021    K 4.1 03/25/2023    BUN 52 (H) 03/25/2023    CREATININE 2.6 (H) 03/25/2023       RADIOLOGY:    CT lumbar spine reviewed. Renal veins at level of first lumbar vertebra.

## 2023-03-30 VITALS
BODY MASS INDEX: 40.14 KG/M2 | RESPIRATION RATE: 16 BRPM | HEIGHT: 67 IN | DIASTOLIC BLOOD PRESSURE: 54 MMHG | HEART RATE: 50 BPM | TEMPERATURE: 96.7 F | SYSTOLIC BLOOD PRESSURE: 111 MMHG | WEIGHT: 255.73 LBS | OXYGEN SATURATION: 95 %

## 2023-03-30 LAB
ANION GAP SERPL CALCULATED.3IONS-SCNC: 13 MMOL/L (ref 7–16)
BUN SERPL-MCNC: 65 MG/DL (ref 6–23)
CALCIUM SERPL-MCNC: 8.5 MG/DL (ref 8.6–10.2)
CHLORIDE SERPL-SCNC: 106 MMOL/L (ref 98–107)
CO2 SERPL-SCNC: 20 MMOL/L (ref 22–29)
CREAT SERPL-MCNC: 2.2 MG/DL (ref 0.7–1.2)
GLUCOSE SERPL-MCNC: 115 MG/DL (ref 74–99)
POTASSIUM SERPL-SCNC: 4.1 MMOL/L (ref 3.5–5)
SODIUM SERPL-SCNC: 139 MMOL/L (ref 132–146)

## 2023-03-30 PROCEDURE — 99232 SBSQ HOSP IP/OBS MODERATE 35: CPT | Performed by: NEUROLOGICAL SURGERY

## 2023-03-30 PROCEDURE — 6370000000 HC RX 637 (ALT 250 FOR IP): Performed by: SURGERY

## 2023-03-30 PROCEDURE — 80048 BASIC METABOLIC PNL TOTAL CA: CPT

## 2023-03-30 PROCEDURE — 6370000000 HC RX 637 (ALT 250 FOR IP): Performed by: INTERNAL MEDICINE

## 2023-03-30 PROCEDURE — 36415 COLL VENOUS BLD VENIPUNCTURE: CPT

## 2023-03-30 PROCEDURE — 6360000002 HC RX W HCPCS: Performed by: SURGERY

## 2023-03-30 PROCEDURE — 6370000000 HC RX 637 (ALT 250 FOR IP): Performed by: FAMILY MEDICINE

## 2023-03-30 RX ORDER — GUAIFENESIN/DEXTROMETHORPHAN 100-10MG/5
5 SYRUP ORAL EVERY 4 HOURS PRN
Status: DISCONTINUED | OUTPATIENT
Start: 2023-03-30 | End: 2023-03-30 | Stop reason: HOSPADM

## 2023-03-30 RX ORDER — SODIUM BICARBONATE 650 MG/1
650 TABLET ORAL 2 TIMES DAILY
Qty: 20 TABLET | Refills: 0 | Status: SHIPPED | OUTPATIENT
Start: 2023-03-30

## 2023-03-30 RX ORDER — METOPROLOL SUCCINATE 25 MG/1
25 TABLET, EXTENDED RELEASE ORAL DAILY
Qty: 30 TABLET | Refills: 3 | Status: SHIPPED | OUTPATIENT
Start: 2023-03-31

## 2023-03-30 RX ORDER — DEXAMETHASONE 0.5 MG/1
TABLET ORAL
Qty: 60 TABLET | Refills: 0 | Status: SHIPPED | OUTPATIENT
Start: 2023-03-30 | End: 2023-04-15

## 2023-03-30 RX ADMIN — GUAIFENESIN SYRUP AND DEXTROMETHORPHAN 5 ML: 100; 10 SYRUP ORAL at 12:16

## 2023-03-30 RX ADMIN — DICYCLOMINE HYDROCHLORIDE 40 MG: 10 CAPSULE ORAL at 09:18

## 2023-03-30 RX ADMIN — NYSTATIN: 100000 CREAM TOPICAL at 09:19

## 2023-03-30 RX ADMIN — ALLOPURINOL 100 MG: 100 TABLET ORAL at 09:18

## 2023-03-30 RX ADMIN — GUAIFENESIN SYRUP AND DEXTROMETHORPHAN 5 ML: 100; 10 SYRUP ORAL at 01:52

## 2023-03-30 RX ADMIN — SODIUM BICARBONATE 650 MG: 650 TABLET ORAL at 09:18

## 2023-03-30 RX ADMIN — GABAPENTIN 300 MG: 300 CAPSULE ORAL at 09:18

## 2023-03-30 RX ADMIN — DEXAMETHASONE SODIUM PHOSPHATE 2 MG: 4 INJECTION, SOLUTION INTRAMUSCULAR; INTRAVENOUS at 01:20

## 2023-03-30 NOTE — CARE COORDINATION
Chart reviewed and case reviewed in IDR. Spoke with Dr Mona Lerner. Patient is OK to go to rehab if OK with Dr Rachel Warren with Nephrology. Spoke with Dr Rachel Warren. Patient OK to discharge on the dose of sodium bicarb he is on now. Dr Mona Lerner aware. Call placed to [facility] and spoke with [facility]. Transportation via ambulance arranged for a 1 pm pick-up time. Call placed to the patient's wife Wilfredo Mireles to notify of above and she is in agreement with transition of care. Bedside nurse Kemal Poe and Charge nurse Errol Cameron notified. Envelope and transfer paperwork completed. LUIS FERNANDO completed. PASAR completed. Kun Meléndez with [facility] notified and they are ready for patient to transition today. Will continue to follow.      Daniel Vo RN.  [facility]  [phone]
Transfer form [facility]- covid positive. Here for  LE weakness. neurosurgery consult. Neurosurgery considering decompression vs holding off and scheduling as an outpatient. Pod#2 Vena Cava Filter Insertion. Spoke with wife Angel Ibrahim this am - requesting [facility]. Referral to [facility] this am - has accepted and started insurance precert. No covid needed. Notified  Natividad hartley of above. Electronically signed by Lore Gordon RN on 3/29/2023 at 2:29 PM    Message from [facility] has been approved- auth good thru tomorrow no covid needed. Passr and ambulance form in soft chart. Electronically signed by Lore Gordon RN on 3/29/2023 at 1:02 PM     Wife and patient updated with above and message to attending also. Also ntfd her that wife is asking for an update. Electronically signed by Lore Gordon RN on 3/29/2023 at 1:13 PM
Transfer form [facility]- covid positive. Here for  LE weakness. neurosurgery consult. Neurosurgery considering decompression vs holding off and scheduling as an outpatient. Pod#1 Vena Cava Filter Insertion. PT eval 13/24 ot  14/24. Call placed to wife Andres Moore - for snf choices- explained  snf with covid  choices  1. [address] left  [facility]    3 [facility]- no beds. Await determinations. Electronically signed by Bayron Douglass RN on 3/28/2023 at 12:34 PM    Call back to wife gave covid snf list again- she is coming to hospital and will discuss again. Electronically signed by Bayron Douglass RN on 3/28/2023 at 1:03 PM    Referral given to [facility] @ [place] - If able to accept asked her to start precert Await determination  . Electronically signed by Bayron Douglass RN on 3/28/2023 at 1:17 PM    Call back from [facility] unable to accept @ [address] r/t covid - could take @ [address]. Met wife and discussed above. Referral to [facility] - await determination. Electronically signed by Bayron Douglass RN on 3/28/2023 at 2:21 PM
plan. Freedom of choice list with basic dialogue that supports the patient's individualized plan of care/goals and shares the quality data associated with the providers was provided to:     Patient Representative Name:       The Patient and/or Patient Representative Agree with the Discharge Plan?   Yes     Baylee Blanco RN  Case Management Department

## 2023-03-30 NOTE — DISCHARGE INSTR - COC
Continuity of Care Form    Patient Name: Meghan Mathews   :  1955  MRN:  [id number]    Admit date:  3/25/2023  Discharge date:  3/30/23    Code Status Order: Full Code   Advance Directives:     Admitting Physician:  Juancarlos Rich DO  PCP: Ha Alonso MD    Discharging Nurse: [facility] Unit/Room#: 1071/2508-M  Discharging Unit Phone Number: [phone]    Emergency Contact:   Extended Emergency Contact Information  Primary Emergency Contact: [address] Phone: [phone]  Mobile Phone: [phone]  Relation: Spouse  Preferred language: English   needed?  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    DIALYSIS FISTULA CREATION Left 2021    CREATION AV FISTULA LEFT ARM performed by Eric Edwards MD at [address] Left 2021    REVISION ARTERIOVENOUS FISTULA LEFT ARM performed by Eric Edwards MD at [address] [facility]  2002    lap tricia    UPPER GASTROINTESTINAL ENDOSCOPY N/A 2019    EGD FOREIGN BODY REMOVAL performed by Diogo Maldonado MD at [address] [facility] Right 3/27/2023    VENA CAVA FILTER INSERTION performed by Tiffany Desouza MD at [address]       Immunization History:   Immunization History   Administered Date(s) Administered    Influenza Virus Vaccine 10/12/2016, 10/16/2018, 2021    Pneumococcal, PPSV23, PNEUMOVAX 23, (age 2y+), SC/IM, 0.5mL 2015, 2016       Active Problems:  Patient Active Problem List   Diagnosis Code    Foreign body in esophagus T18.108A    Encounter regarding vascular access for dialysis for ESRD ([facility] N18.6, Z99.2    CKD (chronic kidney disease) stage 4, GFR 15-29 ml/min ([facility]) N18.4    AVF (arteriovenous fistula)

## 2023-03-30 NOTE — PROGRESS NOTES
At 2130, patients BP 87/49. Recheck 15 minutes later, it was 100/52. Recheck of BP at 2315 and was noted to be 82/47. Dr. Christofer Scales notified via perfect serve of hypotension. New order for 1L of 0.9% sodium chloride to be infused over 2 hours. Fluids initiated. Patient is not in any distress or displays s/s of hypotension.
Call placed to MRI department to inform patient will need pre-medicated prior to test. MRI stated they would call in advance for patient to be medicated. Unable to estimate of when test will be performed.      Cezar Sahu RN, BSN
Chart reviewed. Consult to follow. Agree with indication for IVC filter. Will schedule for 3/27.
DR Weems notified of consult per [facility].   Pt added to census
Department of Neurosurgery  Progress Note    CHIEF COMPLAINT: LE weakness    SUBJECTIVE:  some improvement in LE;      REVIEW OF SYSTEMS :  Constitutional: Negative for chills and fever. Neurological: Negative for dizziness, tremors and speech change. OBJECTIVE:   VITALS:  /70   Pulse 89   Temp 99 °F (37.2 °C) (Oral)   Resp 16   Ht 5' 6\" (1.676 m)   Wt 262 lb 5.6 oz (119 kg)   SpO2 96%   BMI 42.34 kg/m²     PHYSICAL:  AAO x4, rationally conversant. Will Patella Speech clear  Face symmetric GI/FT  Tongue MDL  SS symm and strong  OCAMPO-C LLE: HF 5/5, R HF 4/5 KE4/5 bilaterally DF/PF 4-/5  Intact to fine touch.     Plantars downgoing    DATA:  CBC:   Lab Results   Component Value Date/Time    WBC 5.8 03/25/2023 07:57 AM    RBC 4.49 03/25/2023 07:57 AM    HGB 13.0 03/25/2023 07:57 AM    HCT 39.8 03/25/2023 07:57 AM    MCV 88.6 03/25/2023 07:57 AM    MCH 29.0 03/25/2023 07:57 AM    MCHC 32.7 03/25/2023 07:57 AM    RDW 15.5 03/25/2023 07:57 AM     03/25/2023 07:57 AM    MPV 11.5 03/25/2023 07:57 AM     BMP:    Lab Results   Component Value Date/Time     03/25/2023 07:57 AM    K 4.1 03/25/2023 07:57 AM     03/25/2023 07:57 AM    CO2 19 03/25/2023 07:57 AM    BUN 52 03/25/2023 07:57 AM    LABALBU 3.5 03/25/2023 07:57 AM    CREATININE 2.6 03/25/2023 07:57 AM    CALCIUM 9.0 03/25/2023 07:57 AM    GFRAA 37 04/28/2021 10:25 AM    LABGLOM 26 03/25/2023 07:57 AM    GLUCOSE 98 03/25/2023 07:57 AM     PT/INR:    Lab Results   Component Value Date/Time    PROTIME 11.0 04/28/2021 10:10 AM    INR 1.0 04/28/2021 10:10 AM     PTT:    Lab Results   Component Value Date/Time    APTT 144.6 03/15/2023 01:42 PM   [APTT}    Current Inpatient Medications  Current Facility-Administered Medications: lisinopril (PRINIVIL;ZESTRIL) tablet 20 mg, 20 mg, Oral, Daily  propranolol (INDERAL) tablet 20 mg, 20 mg, Oral, BID  allopurinol (ZYLOPRIM) tablet 100 mg, 100 mg, Oral, Daily  vitamin D (CHOLECALCIFEROL) tablet 2,000 Units,
Department of Neurosurgery  Progress Note    CHIEF COMPLAINT: LE weakness    SUBJECTIVE: s/p IVC filter. States he does not want thoracolumbar laminectomy at this time. Ambulatory to commode    REVIEW OF SYSTEMS :  Constitutional: Negative for chills and fever. Neurological: Negative for dizziness, tremors and speech change. OBJECTIVE:   VITALS:  BP (!) 111/54   Pulse 65   Temp (!) 96.7 °F (35.9 °C) (Temporal)   Resp 16   Ht 5' 7\" (1.702 m)   Wt 255 lb 11.7 oz (116 kg)   SpO2 95%   BMI 40.05 kg/m²     PHYSICAL:  AAO x4, rationally conversant. Author Showman Speech clear  Face symmetric GI/FT  Tongue MDL  SS symm and strong  OCAMPO-C LLE: HF 5/5, R HF 5/5 KE4+ /5 bilaterally DF/PF 4+/5  Intact to fine touch.     Plantars downgoing    DATA:  CBC:   Lab Results   Component Value Date/Time    WBC 7.4 03/29/2023 07:11 AM    RBC 4.46 03/29/2023 07:11 AM    HGB 12.6 03/29/2023 07:11 AM    HCT 40.7 03/29/2023 07:11 AM    MCV 91.3 03/29/2023 07:11 AM    MCH 28.3 03/29/2023 07:11 AM    MCHC 31.0 03/29/2023 07:11 AM    RDW 15.2 03/29/2023 07:11 AM     03/29/2023 07:11 AM    MPV 11.9 03/29/2023 07:11 AM     BMP:    Lab Results   Component Value Date/Time     03/30/2023 06:11 AM    K 4.1 03/30/2023 06:11 AM     03/30/2023 06:11 AM    CO2 20 03/30/2023 06:11 AM    BUN 65 03/30/2023 06:11 AM    LABALBU 3.5 03/25/2023 07:57 AM    CREATININE 2.2 03/30/2023 06:11 AM    CALCIUM 8.5 03/30/2023 06:11 AM    GFRAA 37 04/28/2021 10:25 AM    LABGLOM 32 03/30/2023 06:11 AM    GLUCOSE 115 03/30/2023 06:11 AM     PT/INR:    Lab Results   Component Value Date/Time    PROTIME 11.0 04/28/2021 10:10 AM    INR 1.0 04/28/2021 10:10 AM     PTT:    Lab Results   Component Value Date/Time    APTT 144.6 03/15/2023 01:42 PM   [APTT}    Current Inpatient Medications  Current Facility-Administered Medications: guaiFENesin-dextromethorphan (ROBITUSSIN DM) 100-10 MG/5ML syrup 5 mL, 5 mL, Oral, Q4H PRN  metoprolol succinate (TOPROL XL)
Heart monitor in storage
[facility].   Pt added to census
Messaged Dr. Clary Stauffer via "[employer]" regarding neurosurgery consult.
Nephrology consult sent to Dr. Keyonna Brooks.
Notified Dr. Rosalina Gutierres via [facility] of patient's BP 85/40 1 hour post NS bolus. New orders to repeat bolus.
Nurse to nurse called to [facility] at [facility]. All questions answered at this time. Paperwork faxed to number provided by [facility].  Patient awaiting transport
Occupational Therapy  OT SESSION ATTEMPT     Date:3/27/2023  Patient Name: Zeke Reese  MRN: [id number]  : 1955  Room: [address] [place] [address]H     Attempted OT session this date:    [] unavailable due to other medical staff currently with pt   [] on hold, await MRI/ neurosurgical recommendations. [] on hold per nursing staff secondary to lab / radiology results    [] Pt declined Occupational Therapy  this date. Benefits of participation in therapy reviewed with pt.    [] off unit   [x] Other: HOLD per RN,     Will reattempt OT eval at a later time.     [place], [place] [address]
Patient anxious at this time. Requesting to sit at the side of the bed. Min assist to side of bed.
Patient reminded of NPO status after midnight. Verbalized understanding.
Patient with new onset of controlled Afib. Dr. Pamela Jeff made aware vis [facility]. New order to consult cardiology.
Physical Therapy    Pt received and chart reviewed. Pt on hold from therapy services at this time per RN and possible IVC filter placement. Will follow up as appropriate.      Charmaine Menchaca, PT, DPT  [id number]
Prior Authorization Approval    ondansetron 4mg ODT  Date Initiated: 7/29/2019  Date Completed: 7/29/2019  Prior Auth Type: B vs D                Status: Approved    Effective Date: 04/30/2019 - 07/19/2020  Copay: 0.75     [place] Filled: Yes    Insurance: [employer] Part D - Phone [phone] Fax [phone]  ID: [id number]  Case Number: [id number]   Submitted Via: Telephone    Mary Ann Leonard  [facility] Liaison  Ph: [phone] Page: [phone]         
Second IV bolus complete. BP 94/55.
100 mg, 100 mg, Oral, Daily  vitamin D (CHOLECALCIFEROL) tablet 2,000 Units, 2,000 Units, Oral, QPM  colchicine (COLCRYS) tablet 0.6 mg, 0.6 mg, Oral, QPM  dicyclomine (BENTYL) capsule 40 mg, 40 mg, Oral, TID  docusate sodium (COLACE) capsule 100 mg, 100 mg, Oral, QPM  gabapentin (NEURONTIN) capsule 300 mg, 300 mg, Oral, TID PRN  hydroCHLOROthiazide (HYDRODIURIL) tablet 25 mg, 25 mg, Oral, QPM  topiramate (TOPAMAX) tablet 25 mg, 25 mg, Oral, QPM  sodium chloride flush 0.9 % injection 10 mL, 10 mL, IntraVENous, 2 times per day  sodium chloride flush 0.9 % injection 10 mL, 10 mL, IntraVENous, PRN  0.9 % sodium chloride infusion, , IntraVENous, PRN  promethazine (PHENERGAN) tablet 12.5 mg, 12.5 mg, Oral, Q6H PRN **OR** ondansetron (ZOFRAN) injection 4 mg, 4 mg, IntraVENous, Q6H PRN  polyethylene glycol (GLYCOLAX) packet 17 g, 17 g, Oral, Daily PRN  acetaminophen (TYLENOL) tablet 650 mg, 650 mg, Oral, Q6H PRN **OR** acetaminophen (TYLENOL) suppository 650 mg, 650 mg, Rectal, Q6H PRN  dexamethasone (DECADRON) injection 2 mg, 2 mg, IntraVENous, Q12H  LORazepam (ATIVAN) injection 1 mg, 1 mg, IntraVENous, See Admin Instructions  nystatin (MYCOSTATIN) cream, , Topical, BID    ASSESSMENT:   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 PLAN:  Repeat MRI pending  Pt states he does not want surgery and has discussed with his wife. No new Nsx recommendations at this time  Continue to hold anticoagulation.       Electronically signed by Harrison Elias MD on 3/28/2023 at 8:51 AM
Oral Daily    propranolol  20 mg Oral BID    allopurinol  100 mg Oral Daily    vitamin D  2,000 Units Oral QPM    colchicine  0.6 mg Oral QPM    dicyclomine  40 mg Oral TID    docusate sodium  100 mg Oral QPM    hydroCHLOROthiazide  25 mg Oral QPM    topiramate  25 mg Oral QPM    sodium chloride flush  10 mL IntraVENous 2 times per day    dexamethasone  2 mg IntraVENous Q12H    LORazepam  1 mg IntraVENous See Admin Instructions    nystatin   Topical BID          PHYSICAL EXAM:    Vitals:    03/28/23 1015   BP: (!) 109/57   Pulse:    Resp:    Temp:    SpO2:      CONSTITUTIONAL:  awake, alert, cooperative, no apparent distress  LUNGS:  no increased work of breathing, good air exchange CARDIOVASCULAR:  regular rate and rhythm and pulses 2 plus all extermities bilaterally  ABDOMEN:  soft, non-distended and non-tender  R groin soft, no hematoma    LABS:    Lab Results   Component Value Date    WBC 6.0 03/28/2023    HGB 12.1 (L) 03/28/2023    HCT 41.6 03/28/2023     03/28/2023    PROTIME 11.0 04/28/2021    INR 1.0 04/28/2021    APTT 144.6 (H) 03/15/2023    K 4.4 03/28/2023    BUN 61 (H) 03/28/2023    CREATININE 2.2 (H) 03/28/2023       RADIOLOGY:
recommendations for increased independence, safety, and fall prevention  * Patient/Family education to increase follow through with safety techniques and functional independence  * Recommendation of environmental modifications for increased safety with functional transfers/mobility and ADLs  * Therapeutic exercise to improve motor endurance, ROM, and functional strength for ADLs/functional transfers  * Therapeutic activities to facilitate/challenge dynamic balance, stand tolerance for increased safety and independence with ADLs    Home Living: Pt lives  with wife in 1 story home \"couple steps\" with [facility] to enter.   Bathroom setup:  tub/shower unit with shower chair and grab bars, standard toilet height   Equipment owned: 2 canes    Prior Level of Function:  ADLs - wife assists with LB dressing, pt reports completing all bathing tasks/transfers and toileting IND  Dependent on wife with IADLs  ambulated with 2 canes prior  Driving: no - wife  Occupation: none stated    Pain Level: 3/10 R Thigh pain  Cognition: A&O: 4/4 - delayed processing, poor problem solving during session  Follows multi step directions ~80% of the time   Memory:  fair -   Sequencing:   fair -    Problem solving:  fair -    Judgement/safety:  fair -      Functional Assessment:  AM-PAC Daily Activity Raw Score: 14/24   Initial Eval Status  Date: 3/28/23 Treatment Status  Date: STGs = LTGs  Time frame: 10-14 days   Feeding Set Up   Tray set up  Independent    Grooming Stand by Assist   Hand hygiene standing sink side  Independent    UB Dressing Minimal Assist   [place] gown seated  Independent    LB Dressing Dependent   [place] socks seated EOB  Moderate Assist    Bathing Maximal Assist  Would benefit from continued use of shower chair   Decreased dynamic standing balance  Minimal Assist    Toileting Moderate Assist   Lower surface transfer with use of grab bar  Minimal Assist    Bed Mobility  Log Roll: Min A  Supine to sit: Min A   Sit to supine: Min A
tablet 2,000 Units, 2,000 Units, Oral, QPM  colchicine (COLCRYS) tablet 0.6 mg, 0.6 mg, Oral, QPM  dicyclomine (BENTYL) capsule 40 mg, 40 mg, Oral, TID  docusate sodium (COLACE) capsule 100 mg, 100 mg, Oral, QPM  gabapentin (NEURONTIN) capsule 300 mg, 300 mg, Oral, TID PRN  hydroCHLOROthiazide (HYDRODIURIL) tablet 25 mg, 25 mg, Oral, QPM  topiramate (TOPAMAX) tablet 25 mg, 25 mg, Oral, QPM  sodium chloride flush 0.9 % injection 10 mL, 10 mL, IntraVENous, 2 times per day  sodium chloride flush 0.9 % injection 10 mL, 10 mL, IntraVENous, PRN  0.9 % sodium chloride infusion, , IntraVENous, PRN  promethazine (PHENERGAN) tablet 12.5 mg, 12.5 mg, Oral, Q6H PRN **OR** ondansetron (ZOFRAN) injection 4 mg, 4 mg, IntraVENous, Q6H PRN  polyethylene glycol (GLYCOLAX) packet 17 g, 17 g, Oral, Daily PRN  acetaminophen (TYLENOL) tablet 650 mg, 650 mg, Oral, Q6H PRN **OR** acetaminophen (TYLENOL) suppository 650 mg, 650 mg, Rectal, Q6H PRN  dexamethasone (DECADRON) injection 2 mg, 2 mg, IntraVENous, Q12H  LORazepam (ATIVAN) injection 1 mg, 1 mg, IntraVENous, See Admin Instructions  nystatin (MYCOSTATIN) cream, , Topical, BID    ASSESSMENT:   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 PLAN:  Patient is in consideration of his options. Certainly we discussed surgical decompression versus waiting and pursuing decompression as an outpatient once some of his other medical issues have resolved particularly in light of his improving power function in the lower extremities. Patient is in consideration of his options and is leaning towards avoiding an operation at this juncture. Plan today is for vascular surgery to perform an IVC filter. We will revisit the issue again tomorrow.         Electronically signed by Lenora Riley MD on 3/27/2023 at 4:14 PM
TID    docusate sodium  100 mg Oral QPM    hydroCHLOROthiazide  25 mg Oral QPM    topiramate  25 mg Oral QPM    sodium chloride flush  10 mL IntraVENous 2 times per day    dexamethasone  2 mg IntraVENous Q12H    LORazepam  1 mg IntraVENous See Admin Instructions    nystatin   Topical BID       PRN Meds:  gabapentin, sodium chloride flush, sodium chloride, promethazine **OR** ondansetron, polyethylene glycol, acetaminophen **OR** acetaminophen    IV:   sodium chloride         LABS:  Recent Results (from the past 24 hour(s))   EKG 12 Lead    Collection Time: 03/27/23  2:37 PM   Result Value Ref Range    Ventricular Rate 75 BPM    Atrial Rate 92 BPM    QRS Duration 90 ms    Q-T Interval 340 ms    QTc Calculation (Bazett) 379 ms    R Axis 51 degrees    T Axis -138 degrees       RADIOLOGY:  XR PELVIS (1-2 VIEWS)    Result Date: 3/12/2023  EXAMINATION: ONE XRAY VIEW OF THE PELVIS 3/12/2023 5:05 pm COMPARISON: None. HISTORY: ORDERING SYSTEM PROVIDED HISTORY: weakness TECHNOLOGIST PROVIDED HISTORY: Reason for exam:->weakness What reading provider will be dictating this exam?->CRC FINDINGS: Lower lumbar degenerative disease. Mild bilateral hip DJD. No fracture or dislocation. No acute osseous findings. CT LUMBAR SPINE WO CONTRAST    Result Date: 3/12/2023  EXAMINATION: CT OF THE LUMBAR SPINE WITHOUT CONTRAST  3/12/2023 TECHNIQUE: CT of the lumbar spine was performed without the administration of intravenous contrast. Multiplanar reformatted images are provided for review. Adjustment of mA and/or kV according to patient size was utilized. Automated exposure control, iterative reconstruction, and/or weight based adjustment of the mA/kV was utilized to reduce the radiation dose to as low as reasonably achievable.  COMPARISON: None HISTORY: ORDERING SYSTEM PROVIDED HISTORY: cant walk TECHNOLOGIST PROVIDED HISTORY: Reason for exam:->cant walk Decision Support Exception - unselect if not a suspected or confirmed
dicyclomine  40 mg Oral TID    docusate sodium  100 mg Oral QPM    hydroCHLOROthiazide  25 mg Oral QPM    topiramate  25 mg Oral QPM    sodium chloride flush  10 mL IntraVENous 2 times per day    dexamethasone  2 mg IntraVENous Q12H    LORazepam  1 mg IntraVENous See Admin Instructions    nystatin   Topical BID       PRN Meds:  gabapentin, sodium chloride flush, sodium chloride, promethazine **OR** ondansetron, polyethylene glycol, acetaminophen **OR** acetaminophen    IV:   sodium chloride         LABS:  Recent Results (from the past 24 hour(s))   Basic Metabolic Panel w/ Reflex to MG    Collection Time: 03/27/23  9:53 PM   Result Value Ref Range    Sodium 144 132 - 146 mmol/L    Potassium reflex Magnesium 4.4 3.5 - 5.0 mmol/L    Chloride 109 (H) 98 - 107 mmol/L    CO2 20 (L) 22 - 29 mmol/L    Anion Gap 15 7 - 16 mmol/L    Glucose 133 (H) 74 - 99 mg/dL    BUN 63 (H) 6 - 23 mg/dL    Creatinine 2.4 (H) 0.7 - 1.2 mg/dL    Est, Glom Filt Rate 29 >=60 mL/min/1.73    Calcium 8.7 8.6 - 10.2 mg/dL   CBC with Auto Differential    Collection Time: 03/27/23  9:53 PM   Result Value Ref Range    WBC 9.3 4.5 - 11.5 E9/L    RBC 4.64 3.80 - 5.80 E12/L    Hemoglobin 13.0 12.5 - 16.5 g/dL    Hematocrit 42.3 37.0 - 54.0 %    MCV 91.2 80.0 - 99.9 fL    MCH 28.0 26.0 - 35.0 pg    MCHC 30.7 (L) 32.0 - 34.5 %    RDW 15.5 (H) 11.5 - 15.0 fL    Platelets 525 465 - 204 E9/L    MPV 11.7 7.0 - 12.0 fL    Neutrophils % 93.1 (H) 43.0 - 80.0 %    Lymphocytes % 1.7 (L) 20.0 - 42.0 %    Monocytes % 5.2 2.0 - 12.0 %    Eosinophils % 0.0 0.0 - 6.0 %    Basophils % 0.0 0.0 - 2.0 %    Neutrophils Absolute 8.65 (H) 1.80 - 7.30 E9/L    Lymphocytes Absolute 0.19 (L) 1.50 - 4.00 E9/L    Monocytes Absolute 0.46 0.10 - 0.95 E9/L    Eosinophils Absolute 0.00 (L) 0.05 - 0.50 E9/L    Basophils Absolute 0.00 0.00 - 0.20 E9/L    Polychromasia 1+     Poikilocytes 2+     Ovalocytes 2+    Basic Metabolic Panel w/ Reflex to MG    Collection Time: 03/28/23  6:49 AM
glycol (GLYCOLAX) packet 17 g  17 g Oral Daily PRN Guilherme Necessary, MD        acetaminophen (TYLENOL) tablet 650 mg  650 mg Oral Q6H PRN Guilherme Necessary, MD   650 mg at 03/27/23 2141    Or    acetaminophen (TYLENOL) suppository 650 mg  650 mg Rectal Q6H PRN Guilherme Necessary, MD        dexamethasone (DECADRON) injection 2 mg  2 mg IntraVENous Q12H Guilherme Necessary, MD   2 mg at 03/28/23 0113    LORazepam (ATIVAN) injection 1 mg  1 mg IntraVENous See Admin Instructions Guilherme Necessary, MD        nystatin (MYCOSTATIN) cream   Topical BID Guilherme Necessary, MD   Given at 03/27/23 2142      sodium chloride         Physical Exam:  /66   Pulse 68   Temp 97 °F (36.1 °C) (Temporal)   Resp 18   Ht 5' 6\" (1.676 m)   Wt 262 lb 5.6 oz (119 kg)   SpO2 96%   BMI 42.34 kg/m²   Weight change: Wt Readings from Last 3 Encounters:   03/25/23 262 lb 5.6 oz (119 kg)   03/13/23 269 lb (122 kg)   06/20/22 267 lb (121.1 kg)     On the basis of the patient's COVID-19 infection associated isolation to reduce risk of exposure and personal protective apparatus usage, examination was not performed    Intake/Output:    Intake/Output Summary (Last 24 hours) at 3/28/2023 0803  Last data filed at 3/28/2023 0630  Gross per 24 hour   Intake 2540 ml   Output 595 ml   Net 1945 ml     No intake/output data recorded. Laboratory Tests:  Lab Results   Component Value Date    CREATININE 2.4 (H) 03/27/2023    BUN 63 (H) 03/27/2023     03/27/2023    K 4.4 03/27/2023     (H) 03/27/2023    CO2 20 (L) 03/27/2023     No results for input(s): CKTOTAL, CKMB in the last 72 hours.     Invalid input(s): TROPONONI  No results found for: BNP  Lab Results   Component Value Date/Time    WBC 9.3 03/27/2023 09:53 PM    RBC 4.64 03/27/2023 09:53 PM    HGB 13.0 03/27/2023 09:53 PM    HCT 42.3 03/27/2023 09:53 PM    MCV 91.2 03/27/2023 09:53 PM    MCH 28.0 03/27/2023 09:53 PM    MCHC 30.7 03/27/2023 09:53 PM    RDW 15.5 03/27/2023 09:53 PM    PLT
Condition (MA) What reading provider will be dictating this exam?->CRC FINDINGS: BONES/ALIGNMENT: There is preservation of the normal lordotic curve. There is no evidence of fracture or subluxation. DEGENERATIVE CHANGES: Moderate to marked osteo-degenerative changes are noted throughout the visualized thoracolumbar spine, with anterior and lateral spondylitic ridging present. It is most significant in the lower thoracic spine and at L3-4. Multilevel, bilateral facet hypertrophy and sclerosis is identified. Mild bilateral neural foraminal narrowing is identified at L4-5. Moderate bilateral neural foraminal narrowing is seen at L5-S1. Significant osteo-degenerative changes are noted at the right SI joint, with osseous fusion. Moderate osteo-degenerative changes are noted at the left SI joint. No other significant osseous abnormality is identified. Intervertebral disc space narrowing is seen from L3-4 down to L5-S1. A small posterior ridge-disc complex is identified at L4-5, causing impression on the ventral thecal sac. A small posterior ridge-disc complex is seen at L5-S1, causing impression on the ventral thecal sac. The remainder of the visualized thoracolumbar cord and thecal sac are unremarkable. There is significant spinal canal stenosis is identified at T12-L1, secondary to the productive osteophyte formation. Moderate spinal canal stenosis is seen at L2-3, secondary to the facet arthropathy. Significant spinal canal stenosis is seen at L3-4, secondary to the facet arthropathy. Moderate to marked spinal canal stenosis is seen at L4-5, secondary to the ridge-disc complex and facet arthropathy. Mild spinal canal stenosis is seen at L5-S1, secondary to the ridge-disc complex and facet arthropathy. SOFT TISSUES/RETROPERITONEUM: No paraspinal mass is seen. 1.  No fracture or subluxation. 2.  Osteo-degenerative changes and discogenic disc disease, as described above.  3.  Small posterior ridge-disc
contractures  [x] Safety and Education Training   [x] Patient/Caregiver Education   [x] HEP  [] Other     PT long term treatment goals are located in above grid    Frequency of treatments: 2-5x/week x 1-2 weeks. Time in  0915  Time out  1000    Total Treatment Time  23 minutes     Evaluation Time includes thorough review of current medical information, gathering information on past medical history/social history and prior level of function, completion of standardized testing/informal observation of tasks, assessment of data and education on plan of care and goals.     CPT codes:  [x] Low Complexity PT evaluation 11586  [] Moderate Complexity PT evaluation 56707  [] High Complexity PT evaluation 35475  [] PT Re-evaluation 38418  [] Gait training 98524 0 minutes  [] Manual therapy 95577 0 minutes  [x] Therapeutic activities 14897 23 minutes  [] Therapeutic exercises 88328 0 minutes  [] Neuromuscular reeducation 29879 0 minutes       Sisi Rodriguez PT, DPT  [id number]
daily weights  Monitor labs    2. Non-anion gap metabolic acidosis  In the setting of CKD  Topiramate can also cause RTA  CO2 16 on 3/28--> 20 today  Lactic acid 1.7-WNL  Sodium bicarbonate 650 mg oral twice daily  Monitor labs    3. S/p IVC filter 3/27    4. Atrial fibrillation  On metoprolol  Cardiology following    5. Hypotension  Intermittent hypotension noted  Hydrochlorothiazide and lisinopril currently on hold  Monitor Bps    6.   COVID  Management per primary service    KRZYSZTOF Aragon - CNP
emergency medical condition->Emergency Medical Condition (MA) What reading provider will be dictating this exam?->CRC FINDINGS: BONES/ALIGNMENT: There is preservation of the normal lordotic curve. There is no evidence of fracture or subluxation. DEGENERATIVE CHANGES: Moderate to marked osteo-degenerative changes are noted throughout the visualized thoracolumbar spine, with anterior and lateral spondylitic ridging present. It is most significant in the lower thoracic spine and at L3-4. Multilevel, bilateral facet hypertrophy and sclerosis is identified. Mild bilateral neural foraminal narrowing is identified at L4-5. Moderate bilateral neural foraminal narrowing is seen at L5-S1. Significant osteo-degenerative changes are noted at the right SI joint, with osseous fusion. Moderate osteo-degenerative changes are noted at the left SI joint. No other significant osseous abnormality is identified. Intervertebral disc space narrowing is seen from L3-4 down to L5-S1. A small posterior ridge-disc complex is identified at L4-5, causing impression on the ventral thecal sac. A small posterior ridge-disc complex is seen at L5-S1, causing impression on the ventral thecal sac. The remainder of the visualized thoracolumbar cord and thecal sac are unremarkable. There is significant spinal canal stenosis is identified at T12-L1, secondary to the productive osteophyte formation. Moderate spinal canal stenosis is seen at L2-3, secondary to the facet arthropathy. Significant spinal canal stenosis is seen at L3-4, secondary to the facet arthropathy. Moderate to marked spinal canal stenosis is seen at L4-5, secondary to the ridge-disc complex and facet arthropathy. Mild spinal canal stenosis is seen at L5-S1, secondary to the ridge-disc complex and facet arthropathy. SOFT TISSUES/RETROPERITONEUM: No paraspinal mass is seen. 1.  No fracture or subluxation.  2.  Osteo-degenerative changes and discogenic disc disease, as
remain essential to reducing risk of future atherosclerotic development. Additional management during hospitalization will be deferred to primary care and that of the neurosurgical and vascular surgical services and we will further evaluate him during hospitalization should additional cardiovascular difficulties or concerns arise with arrangements made for an outpatient echocardiogram and outpatient follow-up following hospital discharge. Note: This report was completed utilizing computer voice recognition software. Every effort has been made to ensure accuracy, however; inadvertent computerized transcription errors may be present. Hoa Whitney.  Janny Davidson, [address] [facility]

## 2023-03-30 NOTE — DISCHARGE SUMMARY
osteophyte formation. Moderate spinal   canal stenosis is seen at L2-3, secondary to the facet arthropathy. Significant spinal canal stenosis is seen at L3-4, secondary to the facet   arthropathy. Moderate to marked spinal canal stenosis is seen at L4-5,   secondary to the ridge-disc complex and facet arthropathy. Mild spinal canal   stenosis is seen at L5-S1, secondary to the ridge-disc complex and facet   arthropathy. SOFT TISSUES/RETROPERITONEUM: No paraspinal mass is seen. Impression   1. No fracture or subluxation. 2.  Osteo-degenerative changes and discogenic disc disease, as described   above. 3.  Small posterior ridge-disc complexes at L4-5 and L5-S1.       4.  Multilevel spinal canal stenosis, with significant stenosis seen at   T12-L1 and L3-4. MRI OF THE LUMBAR SPINE WITHOUT CONTRAST, 3/28/2023 9:04 pm       TECHNIQUE:   Multiplanar multisequence MRI of the lumbar spine was performed without the   administration of intravenous contrast.       COMPARISON:   CT lumbar spine 03/23/2023. MRI lumbar spine 03/24/2023       HISTORY:   ORDERING SYSTEM PROVIDED HISTORY: fu hematomyelia   TECHNOLOGIST PROVIDED HISTORY:   Reason for exam:->fu hematomyelia   What is the sedation requirement?->None   What reading provider will be dictating this exam?->CRC       FINDINGS:   BONES/ALIGNMENT: There is normal alignment of the spine. The vertebral body   heights are maintained. The bone marrow signal appears unremarkable. SPINAL CORD: T1/T2 hyperintense signal within the at in the spinal cord at   the T12 level measuring approximately 6 x 6 x 18 mm, stable compared to   recent MRI (2:11, 5:6). SOFT TISSUES: No paraspinal mass identified. L1-L2: There is no significant disc herniation, spinal canal stenosis or   neural foraminal narrowing. L2-L3: There is no significant disc herniation, spinal canal stenosis or   neural foraminal narrowing.   Mild facet

## 2023-03-31 ENCOUNTER — TELEPHONE (OUTPATIENT)
Dept: NEUROSURGERY | Age: 68
End: 2023-03-31

## 2023-03-31 DIAGNOSIS — R07.89 ATYPICAL CHEST PAIN: ICD-10-CM

## 2023-03-31 DIAGNOSIS — I10 PRIMARY HYPERTENSION: ICD-10-CM

## 2023-03-31 DIAGNOSIS — I48.0 PAF (PAROXYSMAL ATRIAL FIBRILLATION) (HCC): Primary | ICD-10-CM

## 2023-03-31 DIAGNOSIS — G95.19: Primary | ICD-10-CM

## 2023-04-05 PROBLEM — I82.453 DEEP VENOUS THROMBOSIS (DVT) OF BOTH PERONEAL VEINS (HCC): Status: ACTIVE | Noted: 2023-04-05

## 2023-04-19 ENCOUNTER — HOSPITAL ENCOUNTER (OUTPATIENT)
Dept: CARDIOLOGY | Age: 68
Discharge: HOME | End: 2023-04-19
Payer: MEDICARE

## 2023-04-19 DIAGNOSIS — R07.89 ATYPICAL CHEST PAIN: ICD-10-CM

## 2023-04-19 DIAGNOSIS — I10 PRIMARY HYPERTENSION: ICD-10-CM

## 2023-04-19 DIAGNOSIS — I48.0 PAF (PAROXYSMAL ATRIAL FIBRILLATION) (HCC): ICD-10-CM

## 2023-04-19 LAB
LV EF: 60 %
LVEF MODALITY: NORMAL

## 2023-04-19 PROCEDURE — 93306 TTE W/DOPPLER COMPLETE: CPT | Performed by: PSYCHIATRY & NEUROLOGY

## 2023-04-20 ENCOUNTER — TELEPHONE (OUTPATIENT)
Dept: CARDIOLOGY CLINIC | Age: 68
End: 2023-04-20

## 2023-04-20 NOTE — TELEPHONE ENCOUNTER
----- Message from Kamilla Gaytan MD sent at 4/19/2023  3:42 PM EDT -----  Please notify patient that echocardiogram demonstrates normal heart muscle function and he remains in atrial fibrillation.   Please find out if he has follow-up scheduled with the neurosurgical service in regards to anticoagulation related issues and please make sure he has follow-up with me that could be my nuclear week if not already scheduled

## 2023-04-20 NOTE — TELEPHONE ENCOUNTER
Spoke to spouse. Patient is suppose to have repeat MRI 05/11 and then see the neuro surgeon right after. She states that was the soonest appointment available that they had.

## 2023-05-04 PROBLEM — F09 COGNITIVE DYSFUNCTION: Status: ACTIVE | Noted: 2021-12-20

## 2023-05-05 ENCOUNTER — OFFICE VISIT (OUTPATIENT)
Dept: CARDIOLOGY CLINIC | Age: 68
End: 2023-05-05

## 2023-05-05 ENCOUNTER — TELEPHONE (OUTPATIENT)
Dept: CARDIOLOGY CLINIC | Age: 68
End: 2023-05-05

## 2023-05-05 VITALS
HEIGHT: 67 IN | RESPIRATION RATE: 16 BRPM | BODY MASS INDEX: 44.29 KG/M2 | WEIGHT: 282.2 LBS | DIASTOLIC BLOOD PRESSURE: 82 MMHG | HEART RATE: 81 BPM | SYSTOLIC BLOOD PRESSURE: 140 MMHG | OXYGEN SATURATION: 95 %

## 2023-05-05 DIAGNOSIS — G95.19: ICD-10-CM

## 2023-05-05 DIAGNOSIS — N18.32 STAGE 3B CHRONIC KIDNEY DISEASE (HCC): ICD-10-CM

## 2023-05-05 DIAGNOSIS — I50.32 CHRONIC DIASTOLIC (CONGESTIVE) HEART FAILURE (HCC): ICD-10-CM

## 2023-05-05 DIAGNOSIS — G47.33 OBSTRUCTIVE SLEEP APNEA: ICD-10-CM

## 2023-05-05 DIAGNOSIS — I82.453 ACUTE DEEP VEIN THROMBOSIS (DVT) OF BOTH PERONEAL VEINS (HCC): ICD-10-CM

## 2023-05-05 DIAGNOSIS — E78.00 PURE HYPERCHOLESTEROLEMIA: ICD-10-CM

## 2023-05-05 DIAGNOSIS — E66.01 MORBID OBESITY (HCC): ICD-10-CM

## 2023-05-05 DIAGNOSIS — I10 PRIMARY HYPERTENSION: ICD-10-CM

## 2023-05-05 DIAGNOSIS — U07.1 COVID-19 VIRUS INFECTION: ICD-10-CM

## 2023-05-05 DIAGNOSIS — I48.0 PAROXYSMAL ATRIAL FIBRILLATION (HCC): Primary | ICD-10-CM

## 2023-05-05 RX ORDER — METOPROLOL SUCCINATE 25 MG/1
25 TABLET, EXTENDED RELEASE ORAL 2 TIMES DAILY
Qty: 60 TABLET | Refills: 3 | Status: SHIPPED | OUTPATIENT
Start: 2023-05-05

## 2023-05-05 RX ORDER — FUROSEMIDE 20 MG/1
20 TABLET ORAL 2 TIMES DAILY
COMMUNITY

## 2023-05-05 NOTE — PROGRESS NOTES
clear to ascultation. Cardiac examination is notable for an irregular rhythm with no palpable thrill. No gallop rhythm or cardiac murmur are identified. A benign abdominal examination is present with the exception of obesity and no masses or organomegaly. Intact pulses are present throughout all extremities and progressive chronic lymphedema edema is present. No focal neurologic deficits are present. Laboratory Tests:  Lab Results   Component Value Date    CREATININE 1.9 (H) 04/21/2023    BUN 26 (H) 04/21/2023     04/21/2023    K 3.8 04/21/2023     04/21/2023    CO2 24 04/21/2023     No results found for: BNP  Lab Results   Component Value Date/Time    WBC 7.4 03/29/2023 07:11 AM    RBC 4.46 03/29/2023 07:11 AM    HGB 12.6 03/29/2023 07:11 AM    HCT 40.7 03/29/2023 07:11 AM    MCV 91.3 03/29/2023 07:11 AM    MCH 28.3 03/29/2023 07:11 AM    MCHC 31.0 03/29/2023 07:11 AM    RDW 15.2 03/29/2023 07:11 AM     03/29/2023 07:11 AM    MPV 11.9 03/29/2023 07:11 AM     No results for input(s): ALKPHOS, ALT, AST, PROT, BILITOT, BILIDIR, LABALBU in the last 72 hours.   Lab Results   Component Value Date/Time    MG 2.1 02/23/2021 09:14 AM     Lab Results   Component Value Date/Time    PROTIME 11.0 04/28/2021 10:10 AM    INR 1.0 04/28/2021 10:10 AM     Lab Results   Component Value Date/Time    TSH 0.543 03/25/2023 11:46 PM     No components found for: CHLPL  Lab Results   Component Value Date    TRIG 178 (H) 04/25/2019     Lab Results   Component Value Date    HDL 42 04/25/2019     Lab Results   Component Value Date    LDLCALC 116 (H) 04/25/2019       Cardiac Tests:  ECG: A resting electrocardiogram demonstrates evidence of atrial fibrillation with a mean ventricular response of approximately 80 bpm with nonspecific ST changes      ASSESSMENT / PLAN: On a clinical basis, the patient presents with evidence of persistent decompensated diastolic heart failure of a multifactorial origin with components

## 2023-05-05 NOTE — TELEPHONE ENCOUNTER
[facility] called today pt was given labs today at Dr. Anastasia Santana visit. Ruma confirmed pt thought they needed drawn today. Ruma will have pt labs drawn and faxed on 5/9/23.  CF

## 2023-05-09 LAB
ANION GAP SERPL CALCULATED.3IONS-SCNC: 12 MMOL/L (ref 7–16)
BNP BLD-MCNC: 5151 PG/ML (ref 0–125)
BUN SERPL-MCNC: 19 MG/DL (ref 6–23)
CALCIUM SERPL-MCNC: 9.1 MG/DL (ref 8.6–10.2)
CHLORIDE SERPL-SCNC: 108 MMOL/L (ref 98–107)
CO2 SERPL-SCNC: 25 MMOL/L (ref 22–29)
CREAT SERPL-MCNC: 2 MG/DL (ref 0.7–1.2)
GLUCOSE SERPL-MCNC: 67 MG/DL (ref 74–99)
POTASSIUM SERPL-SCNC: 3.5 MMOL/L (ref 3.5–5)
SODIUM SERPL-SCNC: 145 MMOL/L (ref 132–146)

## 2023-05-11 ENCOUNTER — OFFICE VISIT (OUTPATIENT)
Dept: NEUROSURGERY | Age: 68
End: 2023-05-11
Payer: MEDICARE

## 2023-05-11 ENCOUNTER — HOSPITAL ENCOUNTER (OUTPATIENT)
Dept: MRI IMAGING | Age: 68
Discharge: HOME | End: 2023-05-13

## 2023-05-11 VITALS
DIASTOLIC BLOOD PRESSURE: 81 MMHG | BODY MASS INDEX: 43.79 KG/M2 | HEIGHT: 67 IN | WEIGHT: 279 LBS | SYSTOLIC BLOOD PRESSURE: 146 MMHG | OXYGEN SATURATION: 96 %

## 2023-05-11 DIAGNOSIS — G95.19: ICD-10-CM

## 2023-05-11 DIAGNOSIS — G95.19: Primary | ICD-10-CM

## 2023-05-11 PROBLEM — M54.30 SCIATICA: Status: ACTIVE | Noted: 2017-12-28

## 2023-05-11 PROBLEM — M54.2 NECK PAIN: Status: ACTIVE | Noted: 2017-12-28

## 2023-05-11 PROBLEM — E55.9 VITAMIN D DEFICIENCY: Status: ACTIVE | Noted: 2017-12-28

## 2023-05-11 PROBLEM — R31.9 HEMATURIA: Status: ACTIVE | Noted: 2017-12-28

## 2023-05-11 PROBLEM — N18.32 STAGE 3B CHRONIC KIDNEY DISEASE (HCC): Status: RESOLVED | Noted: 2023-03-27 | Resolved: 2023-05-11

## 2023-05-11 PROBLEM — H53.9 DISORDER OF VISION: Status: ACTIVE | Noted: 2017-12-28

## 2023-05-11 PROBLEM — M10.9 GOUT: Status: ACTIVE | Noted: 2018-11-08

## 2023-05-11 PROBLEM — E78.2 MIXED HYPERLIPIDEMIA: Status: ACTIVE | Noted: 2017-12-28

## 2023-05-11 PROBLEM — R07.89 ATYPICAL CHEST PAIN: Status: ACTIVE | Noted: 2017-12-28

## 2023-05-11 PROBLEM — F09 COGNITIVE DYSFUNCTION: Status: RESOLVED | Noted: 2021-12-20 | Resolved: 2023-05-11

## 2023-05-11 PROBLEM — E66.9 OBESITY: Status: ACTIVE | Noted: 2017-12-28

## 2023-05-11 PROBLEM — M54.9 BACKACHE: Status: ACTIVE | Noted: 2017-12-28

## 2023-05-11 PROBLEM — M50.90 CERVICAL DISC DISORDER: Status: ACTIVE | Noted: 2017-06-09

## 2023-05-11 PROBLEM — I89.0 LYMPHEDEMA: Status: ACTIVE | Noted: 2017-12-28

## 2023-05-11 PROBLEM — M10.9 GOUTY ARTHROPATHY: Status: ACTIVE | Noted: 2017-12-28

## 2023-05-11 PROBLEM — K83.4 SPASM OF SPHINCTER OF ODDI: Status: ACTIVE | Noted: 2017-12-28

## 2023-05-11 PROBLEM — I10 BENIGN ESSENTIAL HYPERTENSION: Status: ACTIVE | Noted: 2017-07-05

## 2023-05-11 PROBLEM — M25.519 SHOULDER PAIN: Status: ACTIVE | Noted: 2017-12-28

## 2023-05-11 PROCEDURE — 99212 OFFICE O/P EST SF 10 MIN: CPT

## 2023-05-11 PROCEDURE — 3077F SYST BP >= 140 MM HG: CPT | Performed by: NEUROLOGICAL SURGERY

## 2023-05-11 PROCEDURE — 1123F ACP DISCUSS/DSCN MKR DOCD: CPT | Performed by: NEUROLOGICAL SURGERY

## 2023-05-11 PROCEDURE — 3079F DIAST BP 80-89 MM HG: CPT | Performed by: NEUROLOGICAL SURGERY

## 2023-05-11 PROCEDURE — 99214 OFFICE O/P EST MOD 30 MIN: CPT | Performed by: NEUROLOGICAL SURGERY

## 2023-05-11 RX ORDER — BENZONATATE 100 MG/1
CAPSULE ORAL
COMMUNITY
Start: 2023-04-28 | End: 2023-05-11

## 2023-05-11 RX ORDER — FUROSEMIDE 20 MG/1
TABLET ORAL
COMMUNITY
Start: 2023-05-01 | End: 2023-05-11

## 2023-05-11 RX ORDER — GUAIFENESIN DEXTROMETHORPHAN HYDROBROMIDE ORAL SOLUTION 10; 100 MG/5ML; MG/5ML
20 SOLUTION ORAL
COMMUNITY

## 2023-05-11 RX ORDER — CYCLOBENZAPRINE HCL 10 MG
TABLET ORAL
COMMUNITY

## 2023-05-11 RX ORDER — ALBUTEROL SULFATE 90 UG/1
AEROSOL, METERED RESPIRATORY (INHALATION)
COMMUNITY
Start: 2023-04-27

## 2023-05-11 RX ORDER — DICYCLOMINE HCL 20 MG
TABLET ORAL
COMMUNITY
End: 2023-05-11

## 2023-05-11 RX ORDER — CYCLOBENZAPRINE HCL 10 MG
TABLET ORAL
COMMUNITY
Start: 2023-05-03 | End: 2023-05-11

## 2023-05-11 RX ORDER — UBROGEPANT 50 MG/1
TABLET ORAL
COMMUNITY

## 2023-05-11 RX ORDER — BENZONATATE 100 MG/1
CAPSULE ORAL
COMMUNITY

## 2023-05-11 NOTE — PROGRESS NOTES
[facility] NEUROSURGERY  [address]  [phone]       Chief Complaint:   Chief Complaint   Patient presents with    Follow-up     Pt states he has developed a cough, does not use his Cpap due to claustrophobia. Has CHF and peripheral edema. Sees cardiology. Denies Pulmonology         HPI:     I had the pleasure of seeing Julee Bhatia today in neurosurgical clinic. As you know, this 61-year-old gentleman with multiple medical problems had presented with lower extremity DVT and weakness. He had been started on Eliquis and then presented to an outside hospital at the end of March with complaints of lower extremity weakness. 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 His anticoagulation was held and he underwent IVC filter placement. He was scheduled to have an MRI today but was intolerant. Patient has significant claustrophobia and is short of breath with cough and with known COPD and heart failure. Since we last saw him from a neurologic perspective he is done remarkably well. He denied any weakness in the lower extremities. He denied any numbness. He had no loss of bowel or bladder function. In fact the patient states that since this is happened he no longer has low back pain.         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

## 2023-05-11 NOTE — PROGRESS NOTES
Unable to perform mri, the patient was extremely short of breath while laying flat. Multiple attempts made, but patient just in too much distress. Pt instructed to follow up with his ordering physician.

## 2023-05-24 ENCOUNTER — HOSPITAL ENCOUNTER (OUTPATIENT)
Dept: MRI IMAGING | Age: 68
Discharge: HOME | End: 2023-05-26
Payer: MEDICARE

## 2023-05-24 ENCOUNTER — ANESTHESIA EVENT (OUTPATIENT)
Dept: MRI IMAGING | Age: 68
End: 2023-05-24

## 2023-05-24 ENCOUNTER — OFFICE VISIT (OUTPATIENT)
Dept: NEUROSURGERY | Age: 68
End: 2023-05-24
Payer: MEDICARE

## 2023-05-24 ENCOUNTER — ANESTHESIA (OUTPATIENT)
Dept: MRI IMAGING | Age: 68
End: 2023-05-24

## 2023-05-24 VITALS
SYSTOLIC BLOOD PRESSURE: 154 MMHG | HEART RATE: 85 BPM | RESPIRATION RATE: 18 BRPM | DIASTOLIC BLOOD PRESSURE: 88 MMHG | OXYGEN SATURATION: 94 % | TEMPERATURE: 98 F

## 2023-05-24 VITALS
WEIGHT: 279 LBS | HEIGHT: 67 IN | TEMPERATURE: 98 F | HEART RATE: 88 BPM | DIASTOLIC BLOOD PRESSURE: 89 MMHG | SYSTOLIC BLOOD PRESSURE: 139 MMHG | OXYGEN SATURATION: 98 % | BODY MASS INDEX: 43.79 KG/M2 | RESPIRATION RATE: 18 BRPM

## 2023-05-24 DIAGNOSIS — G95.19: ICD-10-CM

## 2023-05-24 DIAGNOSIS — G95.19: Primary | ICD-10-CM

## 2023-05-24 PROCEDURE — 72148 MRI LUMBAR SPINE W/O DYE: CPT

## 2023-05-24 PROCEDURE — 7100000011 HC PHASE II RECOVERY - ADDTL 15 MIN

## 2023-05-24 PROCEDURE — 3075F SYST BP GE 130 - 139MM HG: CPT | Performed by: NEUROLOGICAL SURGERY

## 2023-05-24 PROCEDURE — 3700000000 HC ANESTHESIA ATTENDED CARE

## 2023-05-24 PROCEDURE — 3079F DIAST BP 80-89 MM HG: CPT | Performed by: NEUROLOGICAL SURGERY

## 2023-05-24 PROCEDURE — 1123F ACP DISCUSS/DSCN MKR DOCD: CPT | Performed by: NEUROLOGICAL SURGERY

## 2023-05-24 PROCEDURE — 99214 OFFICE O/P EST MOD 30 MIN: CPT | Performed by: NEUROLOGICAL SURGERY

## 2023-05-24 PROCEDURE — 7100000010 HC PHASE II RECOVERY - FIRST 15 MIN

## 2023-05-24 PROCEDURE — 3700000001 HC ADD 15 MINUTES (ANESTHESIA)

## 2023-05-24 RX ORDER — SODIUM CHLORIDE 9 MG/ML
INJECTION, SOLUTION INTRAVENOUS CONTINUOUS PRN
Status: DISCONTINUED | OUTPATIENT
Start: 2023-05-24 | End: 2023-05-24 | Stop reason: SDUPTHER

## 2023-05-24 RX ORDER — MIDAZOLAM HYDROCHLORIDE 1 MG/ML
INJECTION INTRAMUSCULAR; INTRAVENOUS PRN
Status: DISCONTINUED | OUTPATIENT
Start: 2023-05-24 | End: 2023-05-24 | Stop reason: SDUPTHER

## 2023-05-24 RX ORDER — KETAMINE HYDROCHLORIDE 10 MG/ML
INJECTION INTRAMUSCULAR; INTRAVENOUS PRN
Status: DISCONTINUED | OUTPATIENT
Start: 2023-05-24 | End: 2023-05-24 | Stop reason: SDUPTHER

## 2023-05-24 RX ORDER — SODIUM CHLORIDE 9 MG/ML
INJECTION, SOLUTION INTRAVENOUS PRN
OUTPATIENT
Start: 2023-05-24

## 2023-05-24 RX ORDER — GLYCOPYRROLATE 1 MG/5 ML
SYRINGE (ML) INTRAVENOUS PRN
Status: DISCONTINUED | OUTPATIENT
Start: 2023-05-24 | End: 2023-05-24 | Stop reason: SDUPTHER

## 2023-05-24 RX ORDER — SODIUM CHLORIDE 0.9 % (FLUSH) 0.9 %
5-40 SYRINGE (ML) INJECTION PRN
OUTPATIENT
Start: 2023-05-24

## 2023-05-24 RX ORDER — FLUTICASONE FUROATE, UMECLIDINIUM BROMIDE AND VILANTEROL TRIFENATATE 100; 62.5; 25 UG/1; UG/1; UG/1
POWDER RESPIRATORY (INHALATION)
COMMUNITY
Start: 2023-05-16

## 2023-05-24 RX ORDER — SODIUM CHLORIDE 0.9 % (FLUSH) 0.9 %
5-40 SYRINGE (ML) INJECTION EVERY 12 HOURS SCHEDULED
OUTPATIENT
Start: 2023-05-24

## 2023-05-24 RX ADMIN — KETAMINE HYDROCHLORIDE 20 MG: 10 INJECTION INTRAMUSCULAR; INTRAVENOUS at 09:22

## 2023-05-24 RX ADMIN — SODIUM CHLORIDE: 9 INJECTION, SOLUTION INTRAVENOUS at 09:20

## 2023-05-24 RX ADMIN — MIDAZOLAM HYDROCHLORIDE 2 MG: 1 INJECTION INTRAMUSCULAR; INTRAVENOUS at 09:22

## 2023-05-24 RX ADMIN — Medication 0.2 MG: at 09:20

## 2023-05-24 ASSESSMENT — LIFESTYLE VARIABLES: SMOKING_STATUS: 0

## 2023-05-24 ASSESSMENT — ENCOUNTER SYMPTOMS
SHORTNESS OF BREATH: 1
DYSPNEA ACTIVITY LEVEL: AFTER AMBULATING 1 FLIGHT OF STAIRS

## 2023-05-24 NOTE — ANESTHESIA PRE PROCEDURE
fibrillation, CHF: diastolic, SANDOVAL: after ambulating 1 flight of stairs, hyperlipidemia      ECG reviewed  Rhythm: irregular  Rate: normal  Echocardiogram reviewed         Beta Blocker:  Dose within 24 Hrs         Neuro/Psych:   (+) neuromuscular disease:, headaches: migraine headaches,             GI/Hepatic/Renal:   (+) renal disease (CKD (chronic kidney disease) stage 4, GFR 15-29 ml/min (HCC):, morbid obesity          Endo/Other:    (+) : arthritis: OA., .                 Abdominal:             Vascular:   + DVT, . Other Findings:      ECHO (4/19/23):  Summary   Mildly dilated left ventricle. Mild left ventricular concentric hypertrophy noted. No regional wall motion abnormalities seen. Normal left ventricular ejection fraction. Mild mitral regurgitation is present. The aortic valve appears mildly sclerotic. Mild aortic regurgitation is noted. There is a small circumferential pericardial effusion noted. EKG (5/5/23): Atrial fibrillation   Nonspecific ST depression         Anesthesia Plan      MAC     ASA 4       Induction: intravenous. Anesthetic plan and risks discussed with patient and spouse. Use of blood products discussed with patient whom consented to blood products. Plan discussed with attending and CRNA. KRZYSZTOF Nugent - CRNA   5/24/2023      Patient seen and examined, chart reviewed, agree with above findings. Anesthetic plan, risks, benefits, alternatives, and personnel involved discussed with patient and his wife. Patient verbalized an understanding and agreed to proceed. NPO status confirmed. Anesthetic plan discussed with care team members and agreed upon.     Darek Michel DO   5/24/2023  9:19 AM

## 2023-05-24 NOTE — PROCEDURES
5607 return from mri with anesthesia. Awake and alert. 1000 eating and drinking, vss.    1015 vss, d/c to home with wife.

## 2023-05-24 NOTE — PROGRESS NOTES
CAVA FILTER PLACEMENT Right 3/27/2023    VENA CAVA FILTER INSERTION performed by Clover Zuluaga MD at [address] [place] [address]      No family history on file.    Social History     Socioeconomic History    Marital status:      Spouse name: Not on file    Number of children: Not on file    Years of education: Not on file    Highest education level: Not on file   Occupational History    Not on file   Tobacco Use    Smoking status: Never    Smokeless tobacco: Never   Vaping Use    Vaping Use: Never used   Substance and Sexual Activity    Alcohol use: Never    Drug use: Never    Sexual activity: Not on file   Other Topics Concern    Not on file   Social History Narrative    Not on file     Social Determinants of Health     Financial Resource Strain: Not on file   Food Insecurity: Not on file   Transportation Needs: Not on file   Physical Activity: Not on file   Stress: Not on file   Social Connections: Not on file   Intimate Partner Violence: Not on file   Housing Stability: Not on file       Medications:   Current Outpatient Medications   Medication Sig Dispense Refill    fluticasone-umeclidin-vilant (TRELEGY ELLIPTA) 100-62.5-25 MCG/ACT AEPB inhaler       dextromethorphan-guaiFENesin (ROBITUSSIN-DM)  MG/5ML syrup Take 20 mLs by mouth      cyclobenzaprine (FLEXERIL) 10 MG tablet cyclobenzaprine 10 mg tablet      benzonatate (TESSALON) 100 MG capsule benzonatate 100 mg capsule   TAKE 1 CAPSULE BY MOUTH THREE TIMES DAILY FOR 20 DAYS      Ubrogepant (UBRELVY) 50 MG TABS Take by mouth      furosemide (LASIX) 20 MG tablet Take 1 tablet by mouth 2 times daily      metoprolol succinate (TOPROL XL) 25 MG extended release tablet Take 1 tablet by mouth 2 times daily 60 tablet 3    allopurinol (ZYLOPRIM) 100 MG tablet Take 1 tablet by mouth daily 30 tablet 3    colchicine (COLCRYS) 0.6 MG tablet Take 1 tablet by mouth every evening 30 tablet 0    docusate sodium (COLACE) 100 MG capsule Take 1 capsule by mouth every evening

## 2023-05-24 NOTE — ANESTHESIA POSTPROCEDURE EVALUATION
Department of Anesthesiology  Postprocedure Note    Patient: Aditya Reyes  MRN: [id number]  YOB: 1955  Date of evaluation: 5/24/2023      Procedure Summary     Date: 05/24/23 Room / Location: [address]; [address] [place] [address] Procedures; [address] Radiology    Anesthesia Start: 8265 Anesthesia Stop: 0523    Procedure: MRI LUMBAR SPINE WO CONTRAST Diagnosis:       Hematomyelia ([facility]      (evaluate hematomyelia)    Scheduled Providers: KRZYSZTOF Nugent - CRNA Responsible Provider: Darek Michel DO    Anesthesia Type: MAC ASA Status: 4          Anesthesia Type: No value filed.     Edgar Phase I:      Edgar Phase II: Edgar Score: 10      Anesthesia Post Evaluation    Patient location during evaluation: PACU  Patient participation: complete - patient participated  Level of consciousness: awake and alert  Pain score: 1  Airway patency: patent  Nausea & Vomiting: no nausea and no vomiting  Complications: no  Cardiovascular status: hemodynamically stable  Respiratory status: acceptable  Hydration status: euvolemic

## 2023-05-26 ENCOUNTER — TELEPHONE (OUTPATIENT)
Dept: VASCULAR SURGERY | Age: 68
End: 2023-05-26

## 2023-05-30 ENCOUNTER — OFFICE VISIT (OUTPATIENT)
Dept: VASCULAR SURGERY | Age: 68
End: 2023-05-30

## 2023-05-30 VITALS — WEIGHT: 273 LBS | BODY MASS INDEX: 42.85 KG/M2 | HEIGHT: 67 IN

## 2023-05-30 DIAGNOSIS — I82.412 ACUTE DEEP VEIN THROMBOSIS (DVT) OF FEMORAL VEIN OF LEFT LOWER EXTREMITY (HCC): Primary | ICD-10-CM

## 2023-05-30 PROCEDURE — 99024 POSTOP FOLLOW-UP VISIT: CPT | Performed by: SURGERY

## 2023-05-30 NOTE — PROGRESS NOTES
Vascular Surgery Progress Note    Chief Complaint   Patient presents with    Post-Op Check     IVC       Patient returns for post operative evaluation status post IVC filter insertion. The patient is accompanied by his wife. The patient denies any unexpected problems since hospital discharge. He is accompanied by his wife. They state that they followed up with neurosurgery earlier in the week and has not been cleared for anticoagulation. 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 Heart rhythm is regular. Right      Left   Brachial     Radial     Femoral     Popliteal     Dorsalis Pedis     Posterior Tibial     (3=normal, 2=diminished, 1=barely palpable, 4=widened)    Problem List Items Addressed This Visit       Acute deep vein thrombosis (DVT) of femoral vein of left lower extremity ([address] - Primary     I reviewed with the patient and his wife that the filter can be left in permanently or can be removed. I reviewed with them that ideally we would attempt to remove the filter within 6 months as the filter can become unable to be retrieved. I reviewed with them the risks and benefits of leaving the filter in permanently.   I feel that the benefits outweigh the risks and given his problems I feel the risks of filter thrombosis is very

## 2023-06-02 ENCOUNTER — OFFICE VISIT (OUTPATIENT)
Dept: CARDIOLOGY CLINIC | Age: 68
End: 2023-06-02
Payer: MEDICARE

## 2023-06-02 ENCOUNTER — TELEPHONE (OUTPATIENT)
Dept: CARDIOLOGY CLINIC | Age: 68
End: 2023-06-02

## 2023-06-02 VITALS
HEART RATE: 74 BPM | HEIGHT: 67 IN | DIASTOLIC BLOOD PRESSURE: 84 MMHG | OXYGEN SATURATION: 96 % | WEIGHT: 278.8 LBS | BODY MASS INDEX: 43.76 KG/M2 | SYSTOLIC BLOOD PRESSURE: 136 MMHG | RESPIRATION RATE: 16 BRPM

## 2023-06-02 DIAGNOSIS — I50.32 CHRONIC DIASTOLIC (CONGESTIVE) HEART FAILURE (HCC): Primary | ICD-10-CM

## 2023-06-02 DIAGNOSIS — E66.01 MORBID OBESITY (HCC): ICD-10-CM

## 2023-06-02 DIAGNOSIS — I82.453 ACUTE DEEP VEIN THROMBOSIS (DVT) OF BOTH PERONEAL VEINS (HCC): ICD-10-CM

## 2023-06-02 DIAGNOSIS — N18.32 STAGE 3B CHRONIC KIDNEY DISEASE (HCC): ICD-10-CM

## 2023-06-02 DIAGNOSIS — I48.0 PAROXYSMAL ATRIAL FIBRILLATION (HCC): ICD-10-CM

## 2023-06-02 DIAGNOSIS — E78.00 PURE HYPERCHOLESTEROLEMIA: ICD-10-CM

## 2023-06-02 DIAGNOSIS — I10 PRIMARY HYPERTENSION: ICD-10-CM

## 2023-06-02 DIAGNOSIS — G47.33 OBSTRUCTIVE SLEEP APNEA: ICD-10-CM

## 2023-06-02 DIAGNOSIS — G95.19: ICD-10-CM

## 2023-06-02 PROCEDURE — 3079F DIAST BP 80-89 MM HG: CPT | Performed by: INTERNAL MEDICINE

## 2023-06-02 PROCEDURE — 3075F SYST BP GE 130 - 139MM HG: CPT | Performed by: INTERNAL MEDICINE

## 2023-06-02 PROCEDURE — 93000 ELECTROCARDIOGRAM COMPLETE: CPT | Performed by: INTERNAL MEDICINE

## 2023-06-02 PROCEDURE — 99215 OFFICE O/P EST HI 40 MIN: CPT | Performed by: INTERNAL MEDICINE

## 2023-06-02 PROCEDURE — 1123F ACP DISCUSS/DSCN MKR DOCD: CPT | Performed by: INTERNAL MEDICINE

## 2023-06-02 NOTE — PROGRESS NOTES
spironolactone with need of careful monitoring of his renal function electrolytes as well as that of the benefits of an SGLT2 inhibitor to assist management of his chronic heart failure state. Continued aggressive risk factor modification of blood pressure and serum lipids will remain essential to reducing risk of future atherosclerotic development. I additionally have discussed the needs of therapeutic anticoagulation prior to any consideration of correction of his atrial arrhythmias. I presently plan his clinical reassessment in approximately 3 months and would happily reevaluate him in the interim should additional cardiovascular difficulties or concerns arise. The patient's current medication list, allergies, problem list and results of all previously ordered testing were reviewed at today's visit. Follow-up office visit in 3 months    The duration of discussion and counseling largely related to the consideration of anticoagulation inclusive of as above related benefits, risks, alternatives and consequences of refusal to assist patient management in conjunction with the present encounter exceeded 40 minutes      Note: This report was completed using computerized voice recognition software. Every effort has been made to ensure accuracy, however; inadvertent computerized transcription errors may be present. Franchesca Mireles.  Belén Rogers, [address] [place] [address] Cardiology    An electronic copy of this follow-up progress note was forwarded to Dr. Taina Ricardo

## 2023-06-02 NOTE — TELEPHONE ENCOUNTER
Sample Dispensed: ELIQUIS 5mg  Quantity: 4 boxes of 14 Pills  Lot: [id number]  Exp: 2/25    Samples were given to patient.

## 2023-07-11 ENCOUNTER — APPOINTMENT (OUTPATIENT)
Dept: MRI IMAGING | Age: 68
End: 2023-07-11
Payer: MEDICARE

## 2023-07-11 ENCOUNTER — HOSPITAL ENCOUNTER (INPATIENT)
Age: 68
LOS: 3 days | Discharge: HOME | End: 2023-07-14
Attending: EMERGENCY MEDICINE | Admitting: INTERNAL MEDICINE
Payer: MEDICARE

## 2023-07-11 DIAGNOSIS — N18.9 CHRONIC KIDNEY DISEASE, UNSPECIFIED CKD STAGE: ICD-10-CM

## 2023-07-11 DIAGNOSIS — I48.91 ATRIAL FIBRILLATION, UNSPECIFIED TYPE (HCC): ICD-10-CM

## 2023-07-11 DIAGNOSIS — G95.19: Primary | ICD-10-CM

## 2023-07-11 LAB
ALBUMIN SERPL-MCNC: 4.3 G/DL (ref 3.5–5.2)
ALP SERPL-CCNC: 121 U/L (ref 40–129)
ALT SERPL-CCNC: 30 U/L (ref 0–40)
ANION GAP SERPL CALCULATED.3IONS-SCNC: 10 MMOL/L (ref 7–16)
APTT BLD: 27.4 SEC (ref 24.5–35.1)
AST SERPL-CCNC: 30 U/L (ref 0–39)
BASOPHILS # BLD: 0.11 E9/L (ref 0–0.2)
BASOPHILS NFR BLD: 1.2 % (ref 0–2)
BILIRUB SERPL-MCNC: 0.8 MG/DL (ref 0–1.2)
BUN SERPL-MCNC: 34 MG/DL (ref 6–23)
CALCIUM SERPL-MCNC: 9.4 MG/DL (ref 8.6–10.2)
CHLORIDE SERPL-SCNC: 108 MMOL/L (ref 98–107)
CO2 SERPL-SCNC: 29 MMOL/L (ref 22–29)
CREAT SERPL-MCNC: 2.4 MG/DL (ref 0.7–1.2)
EOSINOPHIL # BLD: 0.48 E9/L (ref 0.05–0.5)
EOSINOPHIL NFR BLD: 5.1 % (ref 0–6)
ERYTHROCYTE [DISTWIDTH] IN BLOOD BY AUTOMATED COUNT: 15.8 FL (ref 11.5–15)
GLUCOSE SERPL-MCNC: 96 MG/DL (ref 74–99)
HCT VFR BLD AUTO: 48.2 % (ref 37–54)
HGB BLD-MCNC: 14.5 G/DL (ref 12.5–16.5)
IMM GRANULOCYTES # BLD: 0.03 E9/L
IMM GRANULOCYTES NFR BLD: 0.3 % (ref 0–5)
INR BLD: 1.4
LYMPHOCYTES # BLD: 1.26 E9/L (ref 1.5–4)
LYMPHOCYTES NFR BLD: 13.5 % (ref 20–42)
MCH RBC QN AUTO: 27 PG (ref 26–35)
MCHC RBC AUTO-ENTMCNC: 30.1 % (ref 32–34.5)
MCV RBC AUTO: 89.8 FL (ref 80–99.9)
MONOCYTES # BLD: 0.87 E9/L (ref 0.1–0.95)
MONOCYTES NFR BLD: 9.3 % (ref 2–12)
NEUTROPHILS # BLD: 6.6 E9/L (ref 1.8–7.3)
NEUTS SEG NFR BLD: 70.6 % (ref 43–80)
PLATELET # BLD AUTO: 216 E9/L (ref 130–450)
PMV BLD AUTO: 12.2 FL (ref 7–12)
POTASSIUM SERPL-SCNC: 4.4 MMOL/L (ref 3.5–5)
PROT SERPL-MCNC: 7.4 G/DL (ref 6.4–8.3)
PROTHROMBIN TIME: 15 SEC (ref 9.3–12.4)
RBC # BLD AUTO: 5.37 E12/L (ref 3.8–5.8)
SODIUM SERPL-SCNC: 147 MMOL/L (ref 132–146)
WBC # BLD: 9.4 E9/L (ref 4.5–11.5)

## 2023-07-11 PROCEDURE — 99285 EMERGENCY DEPT VISIT HI MDM: CPT

## 2023-07-11 PROCEDURE — 6370000000 HC RX 637 (ALT 250 FOR IP): Performed by: EMERGENCY MEDICINE

## 2023-07-11 PROCEDURE — 96376 TX/PRO/DX INJ SAME DRUG ADON: CPT

## 2023-07-11 PROCEDURE — 93005 ELECTROCARDIOGRAM TRACING: CPT | Performed by: EMERGENCY MEDICINE

## 2023-07-11 PROCEDURE — 96375 TX/PRO/DX INJ NEW DRUG ADDON: CPT

## 2023-07-11 PROCEDURE — 1200000000 HC SEMI PRIVATE

## 2023-07-11 PROCEDURE — 85025 COMPLETE CBC W/AUTO DIFF WBC: CPT

## 2023-07-11 PROCEDURE — 85610 PROTHROMBIN TIME: CPT

## 2023-07-11 PROCEDURE — A9577 INJ MULTIHANCE: HCPCS | Performed by: RADIOLOGY

## 2023-07-11 PROCEDURE — 6360000004 HC RX CONTRAST MEDICATION: Performed by: RADIOLOGY

## 2023-07-11 PROCEDURE — 80053 COMPREHEN METABOLIC PANEL: CPT

## 2023-07-11 PROCEDURE — 6360000002 HC RX W HCPCS: Performed by: EMERGENCY MEDICINE

## 2023-07-11 PROCEDURE — 85730 THROMBOPLASTIN TIME PARTIAL: CPT

## 2023-07-11 PROCEDURE — 96374 THER/PROPH/DIAG INJ IV PUSH: CPT

## 2023-07-11 PROCEDURE — 72158 MRI LUMBAR SPINE W/O & W/DYE: CPT

## 2023-07-11 RX ORDER — GABAPENTIN 300 MG/1
300 CAPSULE ORAL ONCE
Status: COMPLETED | OUTPATIENT
Start: 2023-07-11 | End: 2023-07-11

## 2023-07-11 RX ORDER — LORAZEPAM 1 MG/1
2 TABLET ORAL SEE ADMIN INSTRUCTIONS
Status: DISCONTINUED | OUTPATIENT
Start: 2023-07-11 | End: 2023-07-11

## 2023-07-11 RX ORDER — MIDAZOLAM HYDROCHLORIDE 2 MG/2ML
2 INJECTION, SOLUTION INTRAMUSCULAR; INTRAVENOUS ONCE
Status: COMPLETED | OUTPATIENT
Start: 2023-07-11 | End: 2023-07-11

## 2023-07-11 RX ORDER — MIDAZOLAM HYDROCHLORIDE 2 MG/2ML
2 INJECTION, SOLUTION INTRAMUSCULAR; INTRAVENOUS ONCE AS NEEDED
Status: COMPLETED | OUTPATIENT
Start: 2023-07-11 | End: 2023-07-11

## 2023-07-11 RX ADMIN — MIDAZOLAM 2 MG: 1 INJECTION INTRAMUSCULAR; INTRAVENOUS at 20:03

## 2023-07-11 RX ADMIN — GADOBENATE DIMEGLUMINE 20 ML: 529 INJECTION, SOLUTION INTRAVENOUS at 21:10

## 2023-07-11 RX ADMIN — MIDAZOLAM 2 MG: 1 INJECTION INTRAMUSCULAR; INTRAVENOUS at 20:42

## 2023-07-11 RX ADMIN — GABAPENTIN 300 MG: 300 CAPSULE ORAL at 18:31

## 2023-07-11 ASSESSMENT — PAIN DESCRIPTION - ORIENTATION
ORIENTATION: LOWER
ORIENTATION: LOWER

## 2023-07-11 ASSESSMENT — PAIN - FUNCTIONAL ASSESSMENT: PAIN_FUNCTIONAL_ASSESSMENT: 0-10

## 2023-07-11 ASSESSMENT — PAIN SCALES - GENERAL
PAINLEVEL_OUTOF10: 4
PAINLEVEL_OUTOF10: 2

## 2023-07-11 ASSESSMENT — PAIN DESCRIPTION - LOCATION
LOCATION: BACK
LOCATION: BACK

## 2023-07-11 ASSESSMENT — PAIN DESCRIPTION - PAIN TYPE: TYPE: ACUTE PAIN

## 2023-07-11 NOTE — ED PROVIDER NOTES
disease, Lymphadenopathy, Migraine, Prolonged emergence from general anesthesia, and SOB (shortness of breath). EMERGENCY DEPARTMENT COURSE    Vitals:    Vitals:    07/11/23 2046 07/11/23 2049 07/11/23 2333 07/12/23 0103   BP: (!) 119/94 (!) 131/91 (!) 156/98 (!) 144/77   Pulse: 80 83 67 62   Resp: 16 16 22 29   Temp:       SpO2: 96% 97% 100% 100%   Weight:           Patient was given the following medications:  Medications   midazolam PF (VERSED) injection 2 mg (2 mg IntraVENous Given 7/11/23 2003)   midazolam PF (VERSED) injection 2 mg (2 mg IntraVENous Given 7/11/23 2042)   gabapentin (NEURONTIN) capsule 300 mg (300 mg Oral Given 7/11/23 1831)   gadobenate dimeglumine (MULTIHANCE) injection 20 mL (20 mLs IntraVENous Given 7/11/23 2110)       Medical Decision Making/Differential Diagnosis:  CC/HPI Summary, Social Determinants of health, Records Reviewed, DDx, testing done/not done, ED Course, Reassessment, disposition considerations/shared decision making with patient, consults, disposition:        CC/HPI Summary, DDx, ED Course, Reassessment, Tests Considered, Patient expectation:   62-year-old male with past medical history of CKD, hypertension, migraines presents today with concern for low back pain and weakness in his bilateral lower extremities. On arrival to emergency room he is hemodynamically stable, nontoxic in no acute distress. No obvious focal neurologic deficits. History concerning that he says it feels just like the last time he had hematomyelia. Differential diagnosis to include but not limited to hematomyelia, mass, electrolyte abnormality. EKG demonstrating atrial fibrillation. Lab work unremarkable with no leukocytosis or acute anemia, coags appropriate, chronically elevated kidney function slightly worse from prior. He did require IV Versed for MRI. MRI lumbar spine did demonstrate an intramedullary mass concerning for recurrent hematomyelia.   I discussed with neurosurgery and they

## 2023-07-12 PROBLEM — M54.50 LOW BACK PAIN: Status: ACTIVE | Noted: 2023-07-12

## 2023-07-12 LAB
ANION GAP SERPL CALCULATED.3IONS-SCNC: 9 MMOL/L (ref 7–16)
BUN SERPL-MCNC: 29 MG/DL (ref 6–23)
CALCIUM SERPL-MCNC: 9.3 MG/DL (ref 8.6–10.2)
CHLORIDE SERPL-SCNC: 107 MMOL/L (ref 98–107)
CO2 SERPL-SCNC: 30 MMOL/L (ref 22–29)
CREAT SERPL-MCNC: 2.3 MG/DL (ref 0.7–1.2)
EKG ATRIAL RATE: 375 BPM
EKG Q-T INTERVAL: 426 MS
EKG QRS DURATION: 94 MS
EKG QTC CALCULATION (BAZETT): 456 MS
EKG R AXIS: 10 DEGREES
EKG T AXIS: -19 DEGREES
EKG VENTRICULAR RATE: 69 BPM
ERYTHROCYTE [DISTWIDTH] IN BLOOD BY AUTOMATED COUNT: 15.8 FL (ref 11.5–15)
GLUCOSE SERPL-MCNC: 85 MG/DL (ref 74–99)
HCT VFR BLD AUTO: 48.1 % (ref 37–54)
HGB BLD-MCNC: 14.2 G/DL (ref 12.5–16.5)
MCH RBC QN AUTO: 27 PG (ref 26–35)
MCHC RBC AUTO-ENTMCNC: 29.5 % (ref 32–34.5)
MCV RBC AUTO: 91.4 FL (ref 80–99.9)
PLATELET # BLD AUTO: 203 E9/L (ref 130–450)
PMV BLD AUTO: 12 FL (ref 7–12)
POTASSIUM SERPL-SCNC: 3.8 MMOL/L (ref 3.5–5)
RBC # BLD AUTO: 5.26 E12/L (ref 3.8–5.8)
SODIUM SERPL-SCNC: 146 MMOL/L (ref 132–146)
WBC # BLD: 8.1 E9/L (ref 4.5–11.5)

## 2023-07-12 PROCEDURE — 1200000000 HC SEMI PRIVATE

## 2023-07-12 PROCEDURE — 6360000002 HC RX W HCPCS: Performed by: INTERNAL MEDICINE

## 2023-07-12 PROCEDURE — 6370000000 HC RX 637 (ALT 250 FOR IP): Performed by: FAMILY MEDICINE

## 2023-07-12 PROCEDURE — 93010 ELECTROCARDIOGRAM REPORT: CPT | Performed by: INTERNAL MEDICINE

## 2023-07-12 PROCEDURE — 2580000003 HC RX 258: Performed by: INTERNAL MEDICINE

## 2023-07-12 PROCEDURE — 6370000000 HC RX 637 (ALT 250 FOR IP): Performed by: INTERNAL MEDICINE

## 2023-07-12 PROCEDURE — 80048 BASIC METABOLIC PNL TOTAL CA: CPT

## 2023-07-12 PROCEDURE — 94640 AIRWAY INHALATION TREATMENT: CPT

## 2023-07-12 PROCEDURE — 6360000002 HC RX W HCPCS: Performed by: EMERGENCY MEDICINE

## 2023-07-12 PROCEDURE — 2580000003 HC RX 258: Performed by: EMERGENCY MEDICINE

## 2023-07-12 PROCEDURE — 85027 COMPLETE CBC AUTOMATED: CPT

## 2023-07-12 RX ORDER — DOCUSATE SODIUM 100 MG/1
100 CAPSULE, LIQUID FILLED ORAL EVERY EVENING
Status: DISCONTINUED | OUTPATIENT
Start: 2023-07-12 | End: 2023-07-14 | Stop reason: HOSPADM

## 2023-07-12 RX ORDER — SODIUM CHLORIDE 0.9 % (FLUSH) 0.9 %
10 SYRINGE (ML) INJECTION EVERY 12 HOURS SCHEDULED
Status: DISCONTINUED | OUTPATIENT
Start: 2023-07-12 | End: 2023-07-14 | Stop reason: HOSPADM

## 2023-07-12 RX ORDER — ONDANSETRON 2 MG/ML
4 INJECTION INTRAMUSCULAR; INTRAVENOUS EVERY 6 HOURS PRN
Status: DISCONTINUED | OUTPATIENT
Start: 2023-07-12 | End: 2023-07-14 | Stop reason: HOSPADM

## 2023-07-12 RX ORDER — OXYCODONE HYDROCHLORIDE 5 MG/1
5 TABLET ORAL EVERY 4 HOURS PRN
Status: DISCONTINUED | OUTPATIENT
Start: 2023-07-12 | End: 2023-07-14 | Stop reason: HOSPADM

## 2023-07-12 RX ORDER — FUROSEMIDE 20 MG/1
20 TABLET ORAL 2 TIMES DAILY
Status: DISCONTINUED | OUTPATIENT
Start: 2023-07-12 | End: 2023-07-14 | Stop reason: HOSPADM

## 2023-07-12 RX ORDER — TOPIRAMATE 25 MG/1
25 TABLET ORAL EVERY EVENING
Status: DISCONTINUED | OUTPATIENT
Start: 2023-07-12 | End: 2023-07-14 | Stop reason: HOSPADM

## 2023-07-12 RX ORDER — DICYCLOMINE HYDROCHLORIDE 10 MG/1
40 CAPSULE ORAL 3 TIMES DAILY
Status: DISCONTINUED | OUTPATIENT
Start: 2023-07-12 | End: 2023-07-14 | Stop reason: HOSPADM

## 2023-07-12 RX ORDER — BUDESONIDE 0.25 MG/2ML
0.25 INHALANT ORAL 2 TIMES DAILY
Status: DISCONTINUED | OUTPATIENT
Start: 2023-07-12 | End: 2023-07-14 | Stop reason: HOSPADM

## 2023-07-12 RX ORDER — ACETAMINOPHEN 325 MG/1
650 TABLET ORAL EVERY 6 HOURS PRN
Status: DISCONTINUED | OUTPATIENT
Start: 2023-07-12 | End: 2023-07-14 | Stop reason: HOSPADM

## 2023-07-12 RX ORDER — SODIUM CHLORIDE 0.9 % (FLUSH) 0.9 %
10 SYRINGE (ML) INJECTION PRN
Status: DISCONTINUED | OUTPATIENT
Start: 2023-07-12 | End: 2023-07-14 | Stop reason: HOSPADM

## 2023-07-12 RX ORDER — GABAPENTIN 300 MG/1
300 CAPSULE ORAL 3 TIMES DAILY PRN
Status: DISCONTINUED | OUTPATIENT
Start: 2023-07-12 | End: 2023-07-14 | Stop reason: HOSPADM

## 2023-07-12 RX ORDER — COLCHICINE 0.6 MG/1
0.6 TABLET ORAL DAILY PRN
Status: ON HOLD | COMMUNITY

## 2023-07-12 RX ORDER — ACETAMINOPHEN 650 MG/1
650 SUPPOSITORY RECTAL EVERY 6 HOURS PRN
Status: DISCONTINUED | OUTPATIENT
Start: 2023-07-12 | End: 2023-07-14 | Stop reason: HOSPADM

## 2023-07-12 RX ORDER — ONDANSETRON 4 MG/1
4 TABLET, ORALLY DISINTEGRATING ORAL EVERY 8 HOURS PRN
Status: DISCONTINUED | OUTPATIENT
Start: 2023-07-12 | End: 2023-07-14 | Stop reason: HOSPADM

## 2023-07-12 RX ORDER — SODIUM CHLORIDE 9 MG/ML
50 INJECTION, SOLUTION INTRAVENOUS ONCE
Status: COMPLETED | OUTPATIENT
Start: 2023-07-12 | End: 2023-07-12

## 2023-07-12 RX ORDER — SODIUM CHLORIDE 9 MG/ML
INJECTION, SOLUTION INTRAVENOUS PRN
Status: DISCONTINUED | OUTPATIENT
Start: 2023-07-12 | End: 2023-07-14 | Stop reason: HOSPADM

## 2023-07-12 RX ORDER — ALLOPURINOL 100 MG/1
100 TABLET ORAL DAILY
Status: DISCONTINUED | OUTPATIENT
Start: 2023-07-12 | End: 2023-07-14 | Stop reason: HOSPADM

## 2023-07-12 RX ORDER — SENNA PLUS 8.6 MG/1
1 TABLET ORAL DAILY PRN
Status: DISCONTINUED | OUTPATIENT
Start: 2023-07-12 | End: 2023-07-14 | Stop reason: HOSPADM

## 2023-07-12 RX ORDER — CYCLOBENZAPRINE HCL 10 MG
10 TABLET ORAL 3 TIMES DAILY PRN
Status: DISCONTINUED | OUTPATIENT
Start: 2023-07-12 | End: 2023-07-14 | Stop reason: HOSPADM

## 2023-07-12 RX ORDER — BISACODYL 10 MG
10 SUPPOSITORY, RECTAL RECTAL DAILY PRN
Status: DISCONTINUED | OUTPATIENT
Start: 2023-07-12 | End: 2023-07-14 | Stop reason: HOSPADM

## 2023-07-12 RX ORDER — ALPRAZOLAM 0.5 MG/1
0.5 TABLET ORAL DAILY PRN
Status: ON HOLD | COMMUNITY

## 2023-07-12 RX ORDER — METOPROLOL SUCCINATE 25 MG/1
25 TABLET, EXTENDED RELEASE ORAL 2 TIMES DAILY
Status: DISCONTINUED | OUTPATIENT
Start: 2023-07-12 | End: 2023-07-14 | Stop reason: HOSPADM

## 2023-07-12 RX ORDER — ARFORMOTEROL TARTRATE 15 UG/2ML
15 SOLUTION RESPIRATORY (INHALATION) 2 TIMES DAILY
Status: DISCONTINUED | OUTPATIENT
Start: 2023-07-12 | End: 2023-07-14 | Stop reason: HOSPADM

## 2023-07-12 RX ORDER — ALPRAZOLAM 0.25 MG/1
0.5 TABLET ORAL NIGHTLY PRN
Status: DISCONTINUED | OUTPATIENT
Start: 2023-07-12 | End: 2023-07-14 | Stop reason: HOSPADM

## 2023-07-12 RX ADMIN — SODIUM CHLORIDE, PRESERVATIVE FREE 10 ML: 5 INJECTION INTRAVENOUS at 08:04

## 2023-07-12 RX ADMIN — DOCUSATE SODIUM 100 MG: 100 CAPSULE, LIQUID FILLED ORAL at 17:37

## 2023-07-12 RX ADMIN — IPRATROPIUM BROMIDE 0.5 MG: 0.5 SOLUTION RESPIRATORY (INHALATION) at 18:55

## 2023-07-12 RX ADMIN — PROTHROMBIN, COAGULATION FACTOR VII HUMAN, COAGULATION FACTOR IX HUMAN, COAGULATION FACTOR X HUMAN, PROTEIN C, PROTEIN S HUMAN, AND WATER 5000 UNITS: KIT at 07:57

## 2023-07-12 RX ADMIN — ALLOPURINOL 100 MG: 100 TABLET ORAL at 08:04

## 2023-07-12 RX ADMIN — DICYCLOMINE HYDROCHLORIDE 40 MG: 10 CAPSULE ORAL at 17:36

## 2023-07-12 RX ADMIN — BUDESONIDE 250 MCG: 0.25 SUSPENSION RESPIRATORY (INHALATION) at 18:55

## 2023-07-12 RX ADMIN — TOPIRAMATE 25 MG: 25 TABLET, FILM COATED ORAL at 17:37

## 2023-07-12 RX ADMIN — ARFORMOTEROL TARTRATE 15 MCG: 15 SOLUTION RESPIRATORY (INHALATION) at 18:55

## 2023-07-12 RX ADMIN — GABAPENTIN 300 MG: 300 CAPSULE ORAL at 17:37

## 2023-07-12 RX ADMIN — ALPRAZOLAM 0.5 MG: 0.25 TABLET ORAL at 22:19

## 2023-07-12 RX ADMIN — SODIUM CHLORIDE 50 ML: 9 INJECTION, SOLUTION INTRAVENOUS at 08:47

## 2023-07-12 RX ADMIN — METOPROLOL SUCCINATE 25 MG: 25 TABLET, EXTENDED RELEASE ORAL at 08:04

## 2023-07-12 RX ADMIN — METOPROLOL SUCCINATE 25 MG: 25 TABLET, EXTENDED RELEASE ORAL at 21:28

## 2023-07-12 RX ADMIN — IPRATROPIUM BROMIDE 0.5 MG: 0.5 SOLUTION RESPIRATORY (INHALATION) at 16:38

## 2023-07-12 RX ADMIN — FUROSEMIDE 20 MG: 20 TABLET ORAL at 21:28

## 2023-07-12 ASSESSMENT — PAIN SCALES - GENERAL: PAINLEVEL_OUTOF10: 3

## 2023-07-12 ASSESSMENT — PAIN DESCRIPTION - ORIENTATION: ORIENTATION: LOWER

## 2023-07-12 ASSESSMENT — ENCOUNTER SYMPTOMS
PHOTOPHOBIA: 0
BACK PAIN: 1
TROUBLE SWALLOWING: 0
ABDOMINAL PAIN: 0
SHORTNESS OF BREATH: 0

## 2023-07-12 ASSESSMENT — PAIN - FUNCTIONAL ASSESSMENT: PAIN_FUNCTIONAL_ASSESSMENT: 0-10

## 2023-07-12 ASSESSMENT — PAIN DESCRIPTION - LOCATION: LOCATION: BACK

## 2023-07-12 NOTE — ED NOTES
Patient accidentally removes IV, RN attempts IV access x2 unsuccessfully, L limb alert restriction.       Jona England RN  07/12/23 8790

## 2023-07-12 NOTE — ACP (ADVANCE CARE PLANNING)
Advance Care Planning   Healthcare Decision Maker:    Primary Decision Maker: Jacques Kirkpatrick - Spouse - [phone]    Secondary Decision Maker: Monae Adams - Brother/Sister - [phone]    Click here to complete Healthcare Decision Makers including selection of the Healthcare Decision Maker Relationship (ie \"Primary\").

## 2023-07-12 NOTE — H&P
Hospitalist History & Physical      PATIENT NAME:  Geo Luciano    MRN:  [id number]  SERVICE DATE:  07/11/23    Primary Care Physician: Clare Braden MD       SUBJECTIVE  CHIEF COMPLAINT:  had concerns including Extremity Weakness (BLE weakness w/lower back pain that is radiating into lower abdomen. Ongoing for 5 days. ). HPI:  Mr. Geo Luciano, a 74y.o. year old male  who  has a past medical history of A-V fistula ([address]), Arthritis, Back pain, CKD (chronic kidney disease), HTN (hypertension), Keratoconus, Kidney disease, Lymphadenopathy, Migraine, Prolonged emergence from general anesthesia, and SOB (shortness of breath). presents with Extremity Weakness (BLE weakness w/lower back pain that is radiating into lower abdomen. Ongoing for 5 days. )  , leg weakness and low back pain, for the past 3-5 days. no aggravating or relieving factors. Had blood clots and was on eliquis was stopped from as he developed spinal cord hematoma. No fever or chills no nausea vomiting or abdominal pain no hematuria or incontinence. No numbness or tingling.      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

## 2023-07-13 ENCOUNTER — APPOINTMENT (OUTPATIENT)
Dept: GENERAL RADIOLOGY | Age: 68
End: 2023-07-13
Payer: MEDICARE

## 2023-07-13 LAB
ALBUMIN SERPL-MCNC: 3.7 G/DL (ref 3.5–5.2)
ALP SERPL-CCNC: 108 U/L (ref 40–129)
ALT SERPL-CCNC: 26 U/L (ref 0–40)
ANION GAP SERPL CALCULATED.3IONS-SCNC: 16 MMOL/L (ref 7–16)
AST SERPL-CCNC: 22 U/L (ref 0–39)
BASOPHILS # BLD: 0.09 E9/L (ref 0–0.2)
BASOPHILS NFR BLD: 1 % (ref 0–2)
BILIRUB SERPL-MCNC: 0.8 MG/DL (ref 0–1.2)
BNP BLD-MCNC: 6777 PG/ML (ref 0–125)
BUN SERPL-MCNC: 29 MG/DL (ref 6–23)
CALCIUM SERPL-MCNC: 9.5 MG/DL (ref 8.6–10.2)
CHLORIDE SERPL-SCNC: 108 MMOL/L (ref 98–107)
CO2 SERPL-SCNC: 23 MMOL/L (ref 22–29)
CREAT SERPL-MCNC: 2.1 MG/DL (ref 0.7–1.2)
EOSINOPHIL # BLD: 0.54 E9/L (ref 0.05–0.5)
EOSINOPHIL NFR BLD: 6 % (ref 0–6)
ERYTHROCYTE [DISTWIDTH] IN BLOOD BY AUTOMATED COUNT: 15.6 FL (ref 11.5–15)
GLUCOSE SERPL-MCNC: 86 MG/DL (ref 74–99)
HCT VFR BLD AUTO: 43.7 % (ref 37–54)
HGB BLD-MCNC: 13.3 G/DL (ref 12.5–16.5)
IMM GRANULOCYTES # BLD: 0.03 E9/L
IMM GRANULOCYTES NFR BLD: 0.3 % (ref 0–5)
LYMPHOCYTES # BLD: 1.37 E9/L (ref 1.5–4)
LYMPHOCYTES NFR BLD: 15.2 % (ref 20–42)
MCH RBC QN AUTO: 27.1 PG (ref 26–35)
MCHC RBC AUTO-ENTMCNC: 30.4 % (ref 32–34.5)
MCV RBC AUTO: 89 FL (ref 80–99.9)
MONOCYTES # BLD: 0.79 E9/L (ref 0.1–0.95)
MONOCYTES NFR BLD: 8.8 % (ref 2–12)
NEUTROPHILS # BLD: 6.18 E9/L (ref 1.8–7.3)
NEUTS SEG NFR BLD: 68.7 % (ref 43–80)
PLATELET # BLD AUTO: 189 E9/L (ref 130–450)
PMV BLD AUTO: 12.7 FL (ref 7–12)
POTASSIUM SERPL-SCNC: 3.7 MMOL/L (ref 3.5–5)
PROT SERPL-MCNC: 6.4 G/DL (ref 6.4–8.3)
RBC # BLD AUTO: 4.91 E12/L (ref 3.8–5.8)
SODIUM SERPL-SCNC: 147 MMOL/L (ref 132–146)
WBC # BLD: 9 E9/L (ref 4.5–11.5)

## 2023-07-13 PROCEDURE — 97530 THERAPEUTIC ACTIVITIES: CPT

## 2023-07-13 PROCEDURE — 94640 AIRWAY INHALATION TREATMENT: CPT

## 2023-07-13 PROCEDURE — 6360000002 HC RX W HCPCS: Performed by: INTERNAL MEDICINE

## 2023-07-13 PROCEDURE — 83880 ASSAY OF NATRIURETIC PEPTIDE: CPT

## 2023-07-13 PROCEDURE — 97161 PT EVAL LOW COMPLEX 20 MIN: CPT

## 2023-07-13 PROCEDURE — 36415 COLL VENOUS BLD VENIPUNCTURE: CPT

## 2023-07-13 PROCEDURE — 80053 COMPREHEN METABOLIC PANEL: CPT

## 2023-07-13 PROCEDURE — 99222 1ST HOSP IP/OBS MODERATE 55: CPT | Performed by: INTERNAL MEDICINE

## 2023-07-13 PROCEDURE — 97165 OT EVAL LOW COMPLEX 30 MIN: CPT

## 2023-07-13 PROCEDURE — 71045 X-RAY EXAM CHEST 1 VIEW: CPT

## 2023-07-13 PROCEDURE — 85025 COMPLETE CBC W/AUTO DIFF WBC: CPT

## 2023-07-13 PROCEDURE — 2580000003 HC RX 258: Performed by: INTERNAL MEDICINE

## 2023-07-13 PROCEDURE — 6370000000 HC RX 637 (ALT 250 FOR IP): Performed by: FAMILY MEDICINE

## 2023-07-13 PROCEDURE — APPSS60 APP SPLIT SHARED TIME 46-60 MINUTES

## 2023-07-13 PROCEDURE — 6370000000 HC RX 637 (ALT 250 FOR IP): Performed by: INTERNAL MEDICINE

## 2023-07-13 PROCEDURE — 1200000000 HC SEMI PRIVATE

## 2023-07-13 RX ADMIN — IPRATROPIUM BROMIDE 0.5 MG: 0.5 SOLUTION RESPIRATORY (INHALATION) at 20:51

## 2023-07-13 RX ADMIN — METOPROLOL SUCCINATE 25 MG: 25 TABLET, EXTENDED RELEASE ORAL at 20:05

## 2023-07-13 RX ADMIN — METOPROLOL SUCCINATE 25 MG: 25 TABLET, EXTENDED RELEASE ORAL at 08:50

## 2023-07-13 RX ADMIN — IPRATROPIUM BROMIDE 0.5 MG: 0.5 SOLUTION RESPIRATORY (INHALATION) at 07:29

## 2023-07-13 RX ADMIN — IPRATROPIUM BROMIDE 0.5 MG: 0.5 SOLUTION RESPIRATORY (INHALATION) at 17:55

## 2023-07-13 RX ADMIN — FUROSEMIDE 20 MG: 20 TABLET ORAL at 20:05

## 2023-07-13 RX ADMIN — DICYCLOMINE HYDROCHLORIDE 40 MG: 10 CAPSULE ORAL at 16:52

## 2023-07-13 RX ADMIN — DICYCLOMINE HYDROCHLORIDE 40 MG: 10 CAPSULE ORAL at 00:10

## 2023-07-13 RX ADMIN — GABAPENTIN 300 MG: 300 CAPSULE ORAL at 23:30

## 2023-07-13 RX ADMIN — BUDESONIDE 250 MCG: 0.25 SUSPENSION RESPIRATORY (INHALATION) at 20:52

## 2023-07-13 RX ADMIN — ARFORMOTEROL TARTRATE 15 MCG: 15 SOLUTION RESPIRATORY (INHALATION) at 07:29

## 2023-07-13 RX ADMIN — SODIUM CHLORIDE, PRESERVATIVE FREE 10 ML: 5 INJECTION INTRAVENOUS at 20:06

## 2023-07-13 RX ADMIN — DOCUSATE SODIUM 100 MG: 100 CAPSULE, LIQUID FILLED ORAL at 16:53

## 2023-07-13 RX ADMIN — BUDESONIDE 250 MCG: 0.25 SUSPENSION RESPIRATORY (INHALATION) at 07:29

## 2023-07-13 RX ADMIN — SODIUM CHLORIDE, PRESERVATIVE FREE 10 ML: 5 INJECTION INTRAVENOUS at 08:51

## 2023-07-13 RX ADMIN — FUROSEMIDE 20 MG: 20 TABLET ORAL at 08:50

## 2023-07-13 RX ADMIN — ALLOPURINOL 100 MG: 100 TABLET ORAL at 08:50

## 2023-07-13 RX ADMIN — DICYCLOMINE HYDROCHLORIDE 40 MG: 10 CAPSULE ORAL at 12:00

## 2023-07-13 RX ADMIN — TOPIRAMATE 25 MG: 25 TABLET, FILM COATED ORAL at 16:53

## 2023-07-13 RX ADMIN — ALPRAZOLAM 0.5 MG: 0.25 TABLET ORAL at 20:06

## 2023-07-13 RX ADMIN — ARFORMOTEROL TARTRATE 15 MCG: 15 SOLUTION RESPIRATORY (INHALATION) at 20:51

## 2023-07-13 RX ADMIN — DICYCLOMINE HYDROCHLORIDE 40 MG: 10 CAPSULE ORAL at 20:05

## 2023-07-13 RX ADMIN — IPRATROPIUM BROMIDE 0.5 MG: 0.5 SOLUTION RESPIRATORY (INHALATION) at 11:32

## 2023-07-13 ASSESSMENT — PAIN DESCRIPTION - DESCRIPTORS: DESCRIPTORS: SPASM

## 2023-07-13 ASSESSMENT — PAIN DESCRIPTION - LOCATION: LOCATION: LEG

## 2023-07-13 ASSESSMENT — PAIN DESCRIPTION - ORIENTATION: ORIENTATION: RIGHT;LEFT

## 2023-07-13 ASSESSMENT — PAIN - FUNCTIONAL ASSESSMENT: PAIN_FUNCTIONAL_ASSESSMENT: PREVENTS OR INTERFERES SOME ACTIVE ACTIVITIES AND ADLS

## 2023-07-13 ASSESSMENT — PAIN SCALES - GENERAL: PAINLEVEL_OUTOF10: 3

## 2023-07-13 NOTE — CONSULTS
started taking Eliquis again at the end of May early June and has had no complications up until this point. He reports his swelling in his legs from lymphedema are mildly worse today. He also reports intermittent episodes of shortness of breath and dry cough, not at this moment. Patient is currently AF with CVR at 68 with PVCs. He has diminished lung sounds and +2 BLE edema which he reports is worse than baseline. Patient has chronic left hand edema s/p fistula implantation. Patient reports compliance with medications. Patient denies tobacco, alcohol, or drug abuse. Patient reports he is scheduled for MRI/MRA at [facility] August 7 and follow-up with neurosurgery after. .    Most recent VS 97.3, 16 respirations, 86 pulse, 149/84, 93% room air    Please note: past medical records were reviewed per electronic medical record (EMR) - see detailed reports under Past Medical/ Surgical History. Past Medical History:    Chronic diastolic HFpEF:  TTE 1/22/7047: EF 60%. NWMA. Indeterminate diastolic function. Mild concentric LVH. Normal RV size and function. Mild MR. Mild AR. Mild TN. Small circumferential pericardial effusion. Paroxysmal versus permanent AF:  [id number] 3, restarted Eliquis 5/2023  First identified 3/27/2023 with concomitant COVID-19 infection and finding of spinal cord hematoma. IVC placed 3/27/2023  CKD stage IV -baseline creatinine 2.0-2.9>>Left AV fistula -not yet on dialysis>> follows with Dr. Wang Ness  HTN  SACHI noncompliant with CPAP  Thoracic hematoma:  [facility] concern for schwannoma? 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 Lesion is larger 1 correlated with prior MRI from 3/28/2023. Patient went to [facility]>>Last seen outpatient neurosurgery [facility] 6/1/2023  Patient on IVC filter placed. Patient was refusing thoracolumbar laminectomy at that time.   Thoracic MRI:
for numbness and headaches. Psychiatric/Behavioral:  Negative for confusion. Physical Exam  HENT:      Head: Normocephalic. Eyes:      Pupils: Pupils are equal, round, and reactive to light. Cardiovascular:      Rate and Rhythm: Normal rate. Pulmonary:      Effort: Pulmonary effort is normal.   Abdominal:      General: There is no distension. Musculoskeletal:         General: Normal range of motion. Cervical back: Normal range of motion. Comments: Bilateral venous stasis ulcers    Skin:     General: Skin is warm and dry. Neurological:      Mental Status: He is alert. Comments: A&Ox3  CN3-12 intact  Right DF 4-/5 otherwise Motor Strength full   Sensation intact to light touch    Psychiatric:         Thought Content: Thought content normal.      Imagin2023 MRI Lumbar Spine    1. 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 2. This lesion is larger when correlated with prior MRI from 2023  and prior from May 24, 2023. Etiology of this lesion may be idiopathic, yet  could include underlying arteriovenous malformation or possible myxopapillary  ependymoma among other etiologies. 3. Lumbar spondylosis as described above. Assessment:   -Mervat Mello is a 75 y/o male who MRI shows ecurrent larger hematomyelia.     Plan:  -Continue current care  -PT/OT  -No AP/AC or NSAIDS  -Please call with any questions or concerns         Electronically signed by Paddy Loomis PA-C on 2023 at 7:22 AM

## 2023-07-13 NOTE — PLAN OF CARE
Problem: Chronic Conditions and Co-morbidities  Goal: Patient's chronic conditions and co-morbidity symptoms are monitored and maintained or improved  7/13/2023 1120 by Surekha Cooper RN  Outcome: Progressing  7/13/2023 0006 by Nicole Hayes RN  Outcome: Progressing     Problem: Pain  Goal: Verbalizes/displays adequate comfort level or baseline comfort level  7/13/2023 1120 by Surekha Cooper RN  Outcome: Progressing  7/13/2023 0006 by Nicole Hayes RN  Outcome: Progressing     Problem: Safety - Adult  Goal: Free from fall injury  7/13/2023 1120 by Surekha Cooper RN  Outcome: Progressing  7/13/2023 0006 by Nicole Hayes RN  Outcome: Progressing     Problem: Skin/Tissue Integrity  Goal: Absence of new skin breakdown  Description: 1. Monitor for areas of redness and/or skin breakdown  2. Assess vascular access sites hourly  3. Every 4-6 hours minimum:  Change oxygen saturation probe site  4. Every 4-6 hours:  If on nasal continuous positive airway pressure, respiratory therapy assess nares and determine need for appliance change or resting period.   7/13/2023 1120 by Surekha Cooper RN  Outcome: Progressing  7/13/2023 0006 by Nicole Hayes RN  Outcome: Progressing     Problem: ABCDS Injury Assessment  Goal: Absence of physical injury  7/13/2023 1120 by Surekha Cooper RN  Outcome: Progressing  7/13/2023 0006 by Nicole Hayes RN  Outcome: Progressing

## 2023-07-14 VITALS
BODY MASS INDEX: 43.63 KG/M2 | TEMPERATURE: 98.3 F | HEIGHT: 67 IN | DIASTOLIC BLOOD PRESSURE: 83 MMHG | WEIGHT: 278 LBS | RESPIRATION RATE: 16 BRPM | SYSTOLIC BLOOD PRESSURE: 148 MMHG | OXYGEN SATURATION: 96 % | HEART RATE: 80 BPM

## 2023-07-14 PROBLEM — I48.91 ATRIAL FIBRILLATION (HCC): Status: ACTIVE | Noted: 2023-03-27

## 2023-07-14 PROCEDURE — 6370000000 HC RX 637 (ALT 250 FOR IP): Performed by: INTERNAL MEDICINE

## 2023-07-14 PROCEDURE — 6360000002 HC RX W HCPCS: Performed by: INTERNAL MEDICINE

## 2023-07-14 PROCEDURE — 94640 AIRWAY INHALATION TREATMENT: CPT

## 2023-07-14 PROCEDURE — 2580000003 HC RX 258: Performed by: INTERNAL MEDICINE

## 2023-07-14 PROCEDURE — 97535 SELF CARE MNGMENT TRAINING: CPT

## 2023-07-14 RX ORDER — FUROSEMIDE 20 MG/1
40 TABLET ORAL 2 TIMES DAILY
Qty: 60 TABLET | Refills: 3 | Status: ON HOLD
Start: 2023-07-14

## 2023-07-14 RX ORDER — GABAPENTIN 300 MG/1
300 CAPSULE ORAL 3 TIMES DAILY
Qty: 90 CAPSULE | Refills: 2 | Status: ON HOLD
Start: 2023-07-14 | End: 2023-10-12

## 2023-07-14 RX ORDER — FUROSEMIDE 10 MG/ML
40 INJECTION INTRAMUSCULAR; INTRAVENOUS ONCE
Status: COMPLETED | OUTPATIENT
Start: 2023-07-14 | End: 2023-07-14

## 2023-07-14 RX ADMIN — ALLOPURINOL 100 MG: 100 TABLET ORAL at 08:29

## 2023-07-14 RX ADMIN — SODIUM CHLORIDE, PRESERVATIVE FREE 10 ML: 5 INJECTION INTRAVENOUS at 08:29

## 2023-07-14 RX ADMIN — BUDESONIDE 250 MCG: 0.25 SUSPENSION RESPIRATORY (INHALATION) at 07:41

## 2023-07-14 RX ADMIN — ARFORMOTEROL TARTRATE 15 MCG: 15 SOLUTION RESPIRATORY (INHALATION) at 07:41

## 2023-07-14 RX ADMIN — FUROSEMIDE 20 MG: 20 TABLET ORAL at 08:29

## 2023-07-14 RX ADMIN — METOPROLOL SUCCINATE 25 MG: 25 TABLET, EXTENDED RELEASE ORAL at 08:29

## 2023-07-14 RX ADMIN — IPRATROPIUM BROMIDE 0.5 MG: 0.5 SOLUTION RESPIRATORY (INHALATION) at 07:41

## 2023-07-14 RX ADMIN — FUROSEMIDE 40 MG: 10 INJECTION, SOLUTION INTRAMUSCULAR; INTRAVENOUS at 10:02

## 2023-07-14 RX ADMIN — DICYCLOMINE HYDROCHLORIDE 40 MG: 10 CAPSULE ORAL at 08:29

## 2023-07-20 ENCOUNTER — HOSPITAL ENCOUNTER (INPATIENT)
Age: 68
LOS: 5 days | Discharge: ANOTHER HOSPITAL | DRG: 092 | End: 2023-07-26
Attending: EMERGENCY MEDICINE | Admitting: INTERNAL MEDICINE
Payer: MEDICARE

## 2023-07-20 ENCOUNTER — APPOINTMENT (OUTPATIENT)
Dept: MRI IMAGING | Age: 68
DRG: 092 | End: 2023-07-20
Payer: MEDICARE

## 2023-07-20 ENCOUNTER — APPOINTMENT (OUTPATIENT)
Dept: ULTRASOUND IMAGING | Age: 68
DRG: 092 | End: 2023-07-20
Payer: MEDICARE

## 2023-07-20 DIAGNOSIS — G95.19: Primary | ICD-10-CM

## 2023-07-20 LAB
ANION GAP SERPL CALCULATED.3IONS-SCNC: 14 MMOL/L (ref 7–16)
BASOPHILS # BLD: 0.14 K/UL (ref 0–0.2)
BASOPHILS NFR BLD: 1 % (ref 0–2)
BUN SERPL-MCNC: 38 MG/DL (ref 6–23)
CALCIUM SERPL-MCNC: 9.8 MG/DL (ref 8.6–10.2)
CHLORIDE SERPL-SCNC: 99 MMOL/L (ref 98–107)
CO2 SERPL-SCNC: 32 MMOL/L (ref 22–29)
CREAT SERPL-MCNC: 2.2 MG/DL (ref 0.7–1.2)
EOSINOPHIL # BLD: 0.53 K/UL (ref 0.05–0.5)
EOSINOPHILS RELATIVE PERCENT: 5 % (ref 0–6)
ERYTHROCYTE [DISTWIDTH] IN BLOOD BY AUTOMATED COUNT: 15.5 % (ref 11.5–15)
GFR SERPL CREATININE-BSD FRML MDRD: 33 ML/MIN/1.73M2
GLUCOSE SERPL-MCNC: 85 MG/DL (ref 74–99)
HCT VFR BLD AUTO: 49.2 % (ref 37–54)
HGB BLD-MCNC: 15.1 G/DL (ref 12.5–16.5)
IMM GRANULOCYTES # BLD AUTO: 0.04 K/UL (ref 0–0.58)
IMM GRANULOCYTES NFR BLD: 0 % (ref 0–5)
INR PPP: 1.1
LYMPHOCYTES NFR BLD: 1.46 K/UL (ref 1.5–4)
LYMPHOCYTES RELATIVE PERCENT: 14 % (ref 20–42)
MCH RBC QN AUTO: 26.5 PG (ref 26–35)
MCHC RBC AUTO-ENTMCNC: 30.7 G/DL (ref 32–34.5)
MCV RBC AUTO: 86.3 FL (ref 80–99.9)
MONOCYTES NFR BLD: 0.98 K/UL (ref 0.1–0.95)
MONOCYTES NFR BLD: 10 % (ref 2–12)
NEUTROPHILS NFR BLD: 70 % (ref 43–80)
NEUTS SEG NFR BLD: 7.2 K/UL (ref 1.8–7.3)
PLATELET CONFIRMATION: NORMAL
PLATELET, FLUORESCENCE: 185 K/UL (ref 130–450)
PMV BLD AUTO: 12.8 FL (ref 7–12)
POTASSIUM SERPL-SCNC: 3.7 MMOL/L (ref 3.5–5)
PROTHROMBIN TIME: 11.6 SEC (ref 9.3–12.4)
RBC # BLD AUTO: 5.7 M/UL (ref 3.8–5.8)
RBC # BLD: ABNORMAL 10*6/UL
SODIUM SERPL-SCNC: 145 MMOL/L (ref 132–146)
WBC OTHER # BLD: 10.4 K/UL (ref 4.5–11.5)

## 2023-07-20 PROCEDURE — 99285 EMERGENCY DEPT VISIT HI MDM: CPT

## 2023-07-20 PROCEDURE — 93970 EXTREMITY STUDY: CPT

## 2023-07-20 PROCEDURE — 85027 COMPLETE CBC AUTOMATED: CPT

## 2023-07-20 PROCEDURE — 6360000002 HC RX W HCPCS: Performed by: EMERGENCY MEDICINE

## 2023-07-20 PROCEDURE — 72148 MRI LUMBAR SPINE W/O DYE: CPT

## 2023-07-20 PROCEDURE — 96374 THER/PROPH/DIAG INJ IV PUSH: CPT

## 2023-07-20 PROCEDURE — 85610 PROTHROMBIN TIME: CPT

## 2023-07-20 PROCEDURE — 80048 BASIC METABOLIC PNL TOTAL CA: CPT

## 2023-07-20 RX ORDER — LORAZEPAM 2 MG/ML
1 INJECTION INTRAMUSCULAR ONCE
Status: COMPLETED | OUTPATIENT
Start: 2023-07-20 | End: 2023-07-20

## 2023-07-20 RX ADMIN — LORAZEPAM 1 MG: 2 INJECTION INTRAMUSCULAR; INTRAVENOUS at 16:21

## 2023-07-20 ASSESSMENT — PAIN - FUNCTIONAL ASSESSMENT
PAIN_FUNCTIONAL_ASSESSMENT: NONE - DENIES PAIN
PAIN_FUNCTIONAL_ASSESSMENT: NONE - DENIES PAIN

## 2023-07-20 NOTE — ED NOTES
The following labs were labeled with appropriate pt sticker and tubed to lab:     [x] Blue     [x] Lavender   [] on ice  [x] Green/yellow  [] Green/black [] on ice  [] Isis Cheema  [] on ice  [] Yellow  [] Red  [] Type/ Screen  [] ABG  [] VBG    [] COVID-19 swab    [] Rapid  [] PCR  [] Flu swab  [] Peds Viral Panel     [] Urine Sample  [] Fecal Sample  [] Pelvic Cultures  [] Blood Cultures  [] X 2  [] STREP Cultures       Merrell Jeans, RN  07/20/23 6984

## 2023-07-21 PROBLEM — M89.8X8 MASS OF SPINE: Status: ACTIVE | Noted: 2023-07-21

## 2023-07-21 PROBLEM — R26.2 UNABLE TO AMBULATE: Status: ACTIVE | Noted: 2023-07-21

## 2023-07-21 PROCEDURE — 6360000002 HC RX W HCPCS: Performed by: EMERGENCY MEDICINE

## 2023-07-21 PROCEDURE — 96376 TX/PRO/DX INJ SAME DRUG ADON: CPT

## 2023-07-21 PROCEDURE — 6370000000 HC RX 637 (ALT 250 FOR IP): Performed by: EMERGENCY MEDICINE

## 2023-07-21 PROCEDURE — 96375 TX/PRO/DX INJ NEW DRUG ADDON: CPT

## 2023-07-21 PROCEDURE — 2140000000 HC CCU INTERMEDIATE R&B

## 2023-07-21 RX ORDER — FENTANYL CITRATE 50 UG/ML
50 INJECTION, SOLUTION INTRAMUSCULAR; INTRAVENOUS ONCE
Status: COMPLETED | OUTPATIENT
Start: 2023-07-21 | End: 2023-07-21

## 2023-07-21 RX ORDER — ALPRAZOLAM 0.25 MG/1
0.5 TABLET ORAL ONCE
Status: COMPLETED | OUTPATIENT
Start: 2023-07-21 | End: 2023-07-21

## 2023-07-21 RX ORDER — DICYCLOMINE HYDROCHLORIDE 10 MG/1
20 CAPSULE ORAL ONCE
Status: COMPLETED | OUTPATIENT
Start: 2023-07-21 | End: 2023-07-21

## 2023-07-21 RX ADMIN — ALPRAZOLAM 0.5 MG: 0.25 TABLET ORAL at 01:53

## 2023-07-21 RX ADMIN — FENTANYL CITRATE 50 MCG: 50 INJECTION INTRAMUSCULAR; INTRAVENOUS at 14:36

## 2023-07-21 RX ADMIN — DICYCLOMINE HYDROCHLORIDE 20 MG: 10 CAPSULE ORAL at 01:52

## 2023-07-21 RX ADMIN — FENTANYL CITRATE 50 MCG: 50 INJECTION INTRAMUSCULAR; INTRAVENOUS at 18:21

## 2023-07-21 ASSESSMENT — PAIN - FUNCTIONAL ASSESSMENT
PAIN_FUNCTIONAL_ASSESSMENT: 0-10
PAIN_FUNCTIONAL_ASSESSMENT: 0-10

## 2023-07-21 ASSESSMENT — PAIN DESCRIPTION - ORIENTATION
ORIENTATION: LOWER;LEFT;RIGHT
ORIENTATION: RIGHT;LEFT
ORIENTATION: RIGHT;LEFT

## 2023-07-21 ASSESSMENT — PAIN DESCRIPTION - LOCATION
LOCATION: LEG
LOCATION: KNEE;BACK;LEG
LOCATION: LEG

## 2023-07-21 ASSESSMENT — PAIN SCALES - GENERAL
PAINLEVEL_OUTOF10: 6
PAINLEVEL_OUTOF10: 7
PAINLEVEL_OUTOF10: 6

## 2023-07-21 NOTE — ED NOTES
Patient placed on bed pan in attempt to have a BM. Patient had no stool occurence and was taken off bedpan and positioned comfortably in bed.       Nii Childs RN  07/21/23 5022

## 2023-07-21 NOTE — ED NOTES
I followed up phone call for Dr. Jamie Huerta spoke to  neurosurgery at [facility] patient will be accepted there. Call was placed to medicine service at [facility] for acceptance there. Patient agreeable with plan of transfer.      Ulices Hayes MD  07/20/23 2239

## 2023-07-21 NOTE — ED NOTES
Patient has been in the ER pending transfer to [facility] for greater than 28 hours. Recent update by [facility], they still do not have a bed available. Spoke with medicine here, discussed case, they will accept patient pending transfer.      Ngoc Kelly,   07/21/23 3967

## 2023-07-21 NOTE — ED NOTES
Asked Dr. Melissa Ramirez to administer home medications. New orders are obtained.       Chepe Jaquez RN  07/21/23 2063

## 2023-07-21 NOTE — H&P
Hospitalist History & Physical      PATIENT NAME:  Richa Ramírez    MRN:  [id number]  SERVICE DATE:  07/21/23    Primary Care Physician: Chanel Bradley MD       SUBJECTIVE  CHIEF COMPLAINT:  had concerns including Difficulty Walking (Patient having difficulty ambulating d/t increased swelling and weakness). HPI:  Mr. Richa Ramírez, a 74y.o. year old male  who  has a past medical history of A-V fistula ([address]), Arthritis, Back pain, CKD (chronic kidney disease), HTN (hypertension), Keratoconus, Kidney disease, Lymphadenopathy, Migraine, Prolonged emergence from general anesthesia, and SOB (shortness of breath). presents with Difficulty Walking (Patient having difficulty ambulating d/t increased swelling and weakness)  , difficulty ambulating, found to have hemorrhagic mass at his lumbar spine,  talked to nsgy yesterday here and recommended to go [facility]. No fever or chills no nausea vomiting or abdominal pain no hematuria or incontinence. No numbness or tingling.         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

## 2023-07-22 LAB
ALBUMIN SERPL-MCNC: 3.2 G/DL (ref 3.5–5.2)
ALP SERPL-CCNC: 125 U/L (ref 40–129)
ALT SERPL-CCNC: 75 U/L (ref 0–40)
ANION GAP SERPL CALCULATED.3IONS-SCNC: 10 MMOL/L (ref 7–16)
ANION GAP SERPL CALCULATED.3IONS-SCNC: 14 MMOL/L (ref 7–16)
AST SERPL-CCNC: 68 U/L (ref 0–39)
BASOPHILS # BLD: 0.12 K/UL (ref 0–0.2)
BASOPHILS NFR BLD: 1 % (ref 0–2)
BILIRUB SERPL-MCNC: 1 MG/DL (ref 0–1.2)
BUN SERPL-MCNC: 33 MG/DL (ref 6–23)
BUN SERPL-MCNC: 35 MG/DL (ref 6–23)
CALCIUM SERPL-MCNC: 9 MG/DL (ref 8.6–10.2)
CALCIUM SERPL-MCNC: 9.1 MG/DL (ref 8.6–10.2)
CHLORIDE SERPL-SCNC: 100 MMOL/L (ref 98–107)
CHLORIDE SERPL-SCNC: 104 MMOL/L (ref 98–107)
CO2 SERPL-SCNC: 27 MMOL/L (ref 22–29)
CO2 SERPL-SCNC: 28 MMOL/L (ref 22–29)
CREAT SERPL-MCNC: 1.9 MG/DL (ref 0.7–1.2)
CREAT SERPL-MCNC: 1.9 MG/DL (ref 0.7–1.2)
EOSINOPHIL # BLD: 0.27 K/UL (ref 0.05–0.5)
EOSINOPHILS RELATIVE PERCENT: 3 % (ref 0–6)
ERYTHROCYTE [DISTWIDTH] IN BLOOD BY AUTOMATED COUNT: 15.2 % (ref 11.5–15)
GFR SERPL CREATININE-BSD FRML MDRD: 39 ML/MIN/1.73M2
GFR SERPL CREATININE-BSD FRML MDRD: 40 ML/MIN/1.73M2
GLUCOSE SERPL-MCNC: 109 MG/DL (ref 74–99)
GLUCOSE SERPL-MCNC: 95 MG/DL (ref 74–99)
HCT VFR BLD AUTO: 45.2 % (ref 37–54)
HGB BLD-MCNC: 13.9 G/DL (ref 12.5–16.5)
IMM GRANULOCYTES # BLD AUTO: 0.05 K/UL (ref 0–0.58)
IMM GRANULOCYTES NFR BLD: 1 % (ref 0–5)
LYMPHOCYTES NFR BLD: 1.22 K/UL (ref 1.5–4)
LYMPHOCYTES RELATIVE PERCENT: 11 % (ref 20–42)
MAGNESIUM SERPL-MCNC: 2.5 MG/DL (ref 1.6–2.6)
MAGNESIUM SERPL-MCNC: 2.7 MG/DL (ref 1.6–2.6)
MCH RBC QN AUTO: 26.7 PG (ref 26–35)
MCHC RBC AUTO-ENTMCNC: 30.8 G/DL (ref 32–34.5)
MCV RBC AUTO: 86.8 FL (ref 80–99.9)
MONOCYTES NFR BLD: 1.18 K/UL (ref 0.1–0.95)
MONOCYTES NFR BLD: 11 % (ref 2–12)
NEUTROPHILS NFR BLD: 74 % (ref 43–80)
NEUTS SEG NFR BLD: 8.12 K/UL (ref 1.8–7.3)
PLATELET # BLD AUTO: 246 K/UL (ref 130–450)
PMV BLD AUTO: 12.1 FL (ref 7–12)
POTASSIUM SERPL-SCNC: 3.1 MMOL/L (ref 3.5–5)
POTASSIUM SERPL-SCNC: 3.5 MMOL/L (ref 3.5–5)
PROT SERPL-MCNC: 6.7 G/DL (ref 6.4–8.3)
RBC # BLD AUTO: 5.21 M/UL (ref 3.8–5.8)
SODIUM SERPL-SCNC: 141 MMOL/L (ref 132–146)
SODIUM SERPL-SCNC: 142 MMOL/L (ref 132–146)
WBC OTHER # BLD: 11 K/UL (ref 4.5–11.5)

## 2023-07-22 PROCEDURE — 36415 COLL VENOUS BLD VENIPUNCTURE: CPT

## 2023-07-22 PROCEDURE — 85027 COMPLETE CBC AUTOMATED: CPT

## 2023-07-22 PROCEDURE — 6370000000 HC RX 637 (ALT 250 FOR IP): Performed by: INTERNAL MEDICINE

## 2023-07-22 PROCEDURE — 2140000000 HC CCU INTERMEDIATE R&B

## 2023-07-22 PROCEDURE — 80053 COMPREHEN METABOLIC PANEL: CPT

## 2023-07-22 PROCEDURE — 99231 SBSQ HOSP IP/OBS SF/LOW 25: CPT | Performed by: NEUROLOGICAL SURGERY

## 2023-07-22 PROCEDURE — 6360000002 HC RX W HCPCS: Performed by: INTERNAL MEDICINE

## 2023-07-22 PROCEDURE — 93005 ELECTROCARDIOGRAM TRACING: CPT | Performed by: INTERNAL MEDICINE

## 2023-07-22 PROCEDURE — 83735 ASSAY OF MAGNESIUM: CPT

## 2023-07-22 PROCEDURE — 94640 AIRWAY INHALATION TREATMENT: CPT

## 2023-07-22 PROCEDURE — 80048 BASIC METABOLIC PNL TOTAL CA: CPT

## 2023-07-22 PROCEDURE — 2580000003 HC RX 258: Performed by: INTERNAL MEDICINE

## 2023-07-22 RX ORDER — BUDESONIDE 0.25 MG/2ML
0.25 INHALANT ORAL
Status: DISCONTINUED | OUTPATIENT
Start: 2023-07-22 | End: 2023-07-27 | Stop reason: HOSPADM

## 2023-07-22 RX ORDER — ALLOPURINOL 100 MG/1
100 TABLET ORAL DAILY
Status: DISCONTINUED | OUTPATIENT
Start: 2023-07-22 | End: 2023-07-27 | Stop reason: HOSPADM

## 2023-07-22 RX ORDER — DICYCLOMINE HYDROCHLORIDE 10 MG/1
40 CAPSULE ORAL 3 TIMES DAILY
Status: DISCONTINUED | OUTPATIENT
Start: 2023-07-22 | End: 2023-07-27 | Stop reason: HOSPADM

## 2023-07-22 RX ORDER — METOPROLOL SUCCINATE 25 MG/1
25 TABLET, EXTENDED RELEASE ORAL 2 TIMES DAILY
Status: DISCONTINUED | OUTPATIENT
Start: 2023-07-22 | End: 2023-07-27 | Stop reason: HOSPADM

## 2023-07-22 RX ORDER — ACETAMINOPHEN 325 MG/1
650 TABLET ORAL EVERY 6 HOURS PRN
Status: DISCONTINUED | OUTPATIENT
Start: 2023-07-22 | End: 2023-07-27 | Stop reason: HOSPADM

## 2023-07-22 RX ORDER — ALPRAZOLAM 0.25 MG/1
0.5 TABLET ORAL DAILY PRN
Status: DISCONTINUED | OUTPATIENT
Start: 2023-07-22 | End: 2023-07-27 | Stop reason: HOSPADM

## 2023-07-22 RX ORDER — SODIUM CHLORIDE 0.9 % (FLUSH) 0.9 %
10 SYRINGE (ML) INJECTION EVERY 12 HOURS SCHEDULED
Status: DISCONTINUED | OUTPATIENT
Start: 2023-07-22 | End: 2023-07-27 | Stop reason: HOSPADM

## 2023-07-22 RX ORDER — DOCUSATE SODIUM 100 MG/1
100 CAPSULE, LIQUID FILLED ORAL
Status: DISCONTINUED | OUTPATIENT
Start: 2023-07-22 | End: 2023-07-27 | Stop reason: HOSPADM

## 2023-07-22 RX ORDER — SENNOSIDES A AND B 8.6 MG/1
1 TABLET, FILM COATED ORAL DAILY PRN
Status: DISCONTINUED | OUTPATIENT
Start: 2023-07-22 | End: 2023-07-27 | Stop reason: HOSPADM

## 2023-07-22 RX ORDER — ARFORMOTEROL TARTRATE 15 UG/2ML
15 SOLUTION RESPIRATORY (INHALATION)
Status: DISCONTINUED | OUTPATIENT
Start: 2023-07-22 | End: 2023-07-27 | Stop reason: HOSPADM

## 2023-07-22 RX ORDER — ACETAMINOPHEN 650 MG/1
650 SUPPOSITORY RECTAL EVERY 6 HOURS PRN
Status: DISCONTINUED | OUTPATIENT
Start: 2023-07-22 | End: 2023-07-27 | Stop reason: HOSPADM

## 2023-07-22 RX ORDER — ONDANSETRON 2 MG/ML
4 INJECTION INTRAMUSCULAR; INTRAVENOUS EVERY 6 HOURS PRN
Status: DISCONTINUED | OUTPATIENT
Start: 2023-07-22 | End: 2023-07-27 | Stop reason: HOSPADM

## 2023-07-22 RX ORDER — CYCLOBENZAPRINE HCL 10 MG
10 TABLET ORAL NIGHTLY PRN
Status: DISCONTINUED | OUTPATIENT
Start: 2023-07-22 | End: 2023-07-27 | Stop reason: HOSPADM

## 2023-07-22 RX ORDER — SODIUM CHLORIDE 0.9 % (FLUSH) 0.9 %
10 SYRINGE (ML) INJECTION PRN
Status: DISCONTINUED | OUTPATIENT
Start: 2023-07-22 | End: 2023-07-27 | Stop reason: HOSPADM

## 2023-07-22 RX ORDER — TOPIRAMATE 25 MG/1
25 TABLET ORAL DAILY
Status: DISCONTINUED | OUTPATIENT
Start: 2023-07-22 | End: 2023-07-27 | Stop reason: HOSPADM

## 2023-07-22 RX ORDER — COLCHICINE 0.6 MG/1
0.6 TABLET ORAL DAILY PRN
Status: DISCONTINUED | OUTPATIENT
Start: 2023-07-22 | End: 2023-07-27 | Stop reason: HOSPADM

## 2023-07-22 RX ORDER — ONDANSETRON 4 MG/1
4 TABLET, ORALLY DISINTEGRATING ORAL EVERY 8 HOURS PRN
Status: DISCONTINUED | OUTPATIENT
Start: 2023-07-22 | End: 2023-07-27 | Stop reason: HOSPADM

## 2023-07-22 RX ORDER — BISACODYL 10 MG
10 SUPPOSITORY, RECTAL RECTAL DAILY PRN
Status: DISCONTINUED | OUTPATIENT
Start: 2023-07-22 | End: 2023-07-27 | Stop reason: HOSPADM

## 2023-07-22 RX ORDER — GABAPENTIN 300 MG/1
300 CAPSULE ORAL 3 TIMES DAILY
Status: DISCONTINUED | OUTPATIENT
Start: 2023-07-22 | End: 2023-07-27 | Stop reason: HOSPADM

## 2023-07-22 RX ORDER — SODIUM CHLORIDE 9 MG/ML
INJECTION, SOLUTION INTRAVENOUS PRN
Status: DISCONTINUED | OUTPATIENT
Start: 2023-07-22 | End: 2023-07-27 | Stop reason: HOSPADM

## 2023-07-22 RX ORDER — OXYCODONE HYDROCHLORIDE 5 MG/1
5 TABLET ORAL EVERY 4 HOURS PRN
Status: DISCONTINUED | OUTPATIENT
Start: 2023-07-22 | End: 2023-07-27 | Stop reason: HOSPADM

## 2023-07-22 RX ADMIN — SENNOSIDES 8.6 MG: 8.6 TABLET, FILM COATED ORAL at 05:35

## 2023-07-22 RX ADMIN — IPRATROPIUM BROMIDE 0.5 MG: 0.5 SOLUTION RESPIRATORY (INHALATION) at 15:43

## 2023-07-22 RX ADMIN — COLCHICINE 0.6 MG: 0.6 TABLET ORAL at 09:17

## 2023-07-22 RX ADMIN — GABAPENTIN 300 MG: 300 CAPSULE ORAL at 15:32

## 2023-07-22 RX ADMIN — ARFORMOTEROL TARTRATE 15 MCG: 15 SOLUTION RESPIRATORY (INHALATION) at 20:11

## 2023-07-22 RX ADMIN — DICYCLOMINE HYDROCHLORIDE 40 MG: 10 CAPSULE ORAL at 15:33

## 2023-07-22 RX ADMIN — BUDESONIDE 250 MCG: 0.25 SUSPENSION RESPIRATORY (INHALATION) at 08:16

## 2023-07-22 RX ADMIN — IPRATROPIUM BROMIDE 0.5 MG: 0.5 SOLUTION RESPIRATORY (INHALATION) at 11:33

## 2023-07-22 RX ADMIN — METOPROLOL SUCCINATE 25 MG: 25 TABLET, EXTENDED RELEASE ORAL at 09:18

## 2023-07-22 RX ADMIN — ARFORMOTEROL TARTRATE 15 MCG: 15 SOLUTION RESPIRATORY (INHALATION) at 08:16

## 2023-07-22 RX ADMIN — METOPROLOL SUCCINATE 25 MG: 25 TABLET, EXTENDED RELEASE ORAL at 22:26

## 2023-07-22 RX ADMIN — ALLOPURINOL 100 MG: 100 TABLET ORAL at 09:18

## 2023-07-22 RX ADMIN — GABAPENTIN 300 MG: 300 CAPSULE ORAL at 22:26

## 2023-07-22 RX ADMIN — IPRATROPIUM BROMIDE 0.5 MG: 0.5 SOLUTION RESPIRATORY (INHALATION) at 20:11

## 2023-07-22 RX ADMIN — SODIUM CHLORIDE, PRESERVATIVE FREE 10 ML: 5 INJECTION INTRAVENOUS at 09:17

## 2023-07-22 RX ADMIN — METOPROLOL SUCCINATE 25 MG: 25 TABLET, EXTENDED RELEASE ORAL at 01:10

## 2023-07-22 RX ADMIN — DICYCLOMINE HYDROCHLORIDE 40 MG: 10 CAPSULE ORAL at 22:25

## 2023-07-22 RX ADMIN — DOCUSATE SODIUM 100 MG: 100 CAPSULE, LIQUID FILLED ORAL at 09:18

## 2023-07-22 RX ADMIN — TOPIRAMATE 25 MG: 25 TABLET, FILM COATED ORAL at 09:18

## 2023-07-22 RX ADMIN — DICYCLOMINE HYDROCHLORIDE 40 MG: 10 CAPSULE ORAL at 09:17

## 2023-07-22 RX ADMIN — ACETAMINOPHEN 650 MG: 325 TABLET ORAL at 01:10

## 2023-07-22 RX ADMIN — IPRATROPIUM BROMIDE 0.5 MG: 0.5 SOLUTION RESPIRATORY (INHALATION) at 08:15

## 2023-07-22 RX ADMIN — BUDESONIDE 250 MCG: 0.25 SUSPENSION RESPIRATORY (INHALATION) at 20:11

## 2023-07-22 RX ADMIN — GABAPENTIN 300 MG: 300 CAPSULE ORAL at 09:17

## 2023-07-22 ASSESSMENT — PAIN SCALES - GENERAL
PAINLEVEL_OUTOF10: 3
PAINLEVEL_OUTOF10: 6
PAINLEVEL_OUTOF10: 3

## 2023-07-22 ASSESSMENT — PAIN DESCRIPTION - LOCATION
LOCATION: LEG
LOCATION: LEG

## 2023-07-22 ASSESSMENT — PAIN DESCRIPTION - DESCRIPTORS: DESCRIPTORS: DISCOMFORT;DULL

## 2023-07-22 ASSESSMENT — PAIN DESCRIPTION - ORIENTATION
ORIENTATION: LOWER;RIGHT;LEFT
ORIENTATION: RIGHT;LEFT

## 2023-07-22 NOTE — ED NOTES
[employer] callled to [employer].  All questions answered      Pearl Oliver RN  07/21/23 2026

## 2023-07-23 LAB
ANION GAP SERPL CALCULATED.3IONS-SCNC: 13 MMOL/L (ref 7–16)
BUN SERPL-MCNC: 33 MG/DL (ref 6–23)
CALCIUM SERPL-MCNC: 8.9 MG/DL (ref 8.6–10.2)
CHLORIDE SERPL-SCNC: 105 MMOL/L (ref 98–107)
CO2 SERPL-SCNC: 24 MMOL/L (ref 22–29)
CREAT SERPL-MCNC: 1.8 MG/DL (ref 0.7–1.2)
ERYTHROCYTE [DISTWIDTH] IN BLOOD BY AUTOMATED COUNT: 15.3 % (ref 11.5–15)
GFR SERPL CREATININE-BSD FRML MDRD: 41 ML/MIN/1.73M2
GLUCOSE SERPL-MCNC: 82 MG/DL (ref 74–99)
HCT VFR BLD AUTO: 43.6 % (ref 37–54)
HGB BLD-MCNC: 13.5 G/DL (ref 12.5–16.5)
MCH RBC QN AUTO: 26.9 PG (ref 26–35)
MCHC RBC AUTO-ENTMCNC: 31 G/DL (ref 32–34.5)
MCV RBC AUTO: 87 FL (ref 80–99.9)
PLATELET # BLD AUTO: 227 K/UL (ref 130–450)
PMV BLD AUTO: 11.9 FL (ref 7–12)
POTASSIUM SERPL-SCNC: 3.7 MMOL/L (ref 3.5–5)
RBC # BLD AUTO: 5.01 M/UL (ref 3.8–5.8)
SODIUM SERPL-SCNC: 142 MMOL/L (ref 132–146)
WBC OTHER # BLD: 8.6 K/UL (ref 4.5–11.5)

## 2023-07-23 PROCEDURE — 6370000000 HC RX 637 (ALT 250 FOR IP): Performed by: INTERNAL MEDICINE

## 2023-07-23 PROCEDURE — 94640 AIRWAY INHALATION TREATMENT: CPT

## 2023-07-23 PROCEDURE — 6360000002 HC RX W HCPCS: Performed by: INTERNAL MEDICINE

## 2023-07-23 PROCEDURE — 80048 BASIC METABOLIC PNL TOTAL CA: CPT

## 2023-07-23 PROCEDURE — 85027 COMPLETE CBC AUTOMATED: CPT

## 2023-07-23 PROCEDURE — 2140000000 HC CCU INTERMEDIATE R&B

## 2023-07-23 PROCEDURE — 36415 COLL VENOUS BLD VENIPUNCTURE: CPT

## 2023-07-23 PROCEDURE — 2580000003 HC RX 258: Performed by: INTERNAL MEDICINE

## 2023-07-23 RX ORDER — ALPRAZOLAM 0.25 MG/1
0.25 TABLET ORAL 3 TIMES DAILY PRN
Status: DISCONTINUED | OUTPATIENT
Start: 2023-07-23 | End: 2023-07-27 | Stop reason: HOSPADM

## 2023-07-23 RX ADMIN — BUDESONIDE 250 MCG: 0.25 SUSPENSION RESPIRATORY (INHALATION) at 21:40

## 2023-07-23 RX ADMIN — OXYCODONE HYDROCHLORIDE 5 MG: 5 TABLET ORAL at 20:51

## 2023-07-23 RX ADMIN — IPRATROPIUM BROMIDE 0.5 MG: 0.5 SOLUTION RESPIRATORY (INHALATION) at 21:40

## 2023-07-23 RX ADMIN — OXYCODONE HYDROCHLORIDE 5 MG: 5 TABLET ORAL at 14:17

## 2023-07-23 RX ADMIN — IPRATROPIUM BROMIDE 0.5 MG: 0.5 SOLUTION RESPIRATORY (INHALATION) at 17:11

## 2023-07-23 RX ADMIN — BUDESONIDE 250 MCG: 0.25 SUSPENSION RESPIRATORY (INHALATION) at 09:42

## 2023-07-23 RX ADMIN — DICYCLOMINE HYDROCHLORIDE 40 MG: 10 CAPSULE ORAL at 09:29

## 2023-07-23 RX ADMIN — ARFORMOTEROL TARTRATE 15 MCG: 15 SOLUTION RESPIRATORY (INHALATION) at 09:42

## 2023-07-23 RX ADMIN — METOPROLOL SUCCINATE 25 MG: 25 TABLET, EXTENDED RELEASE ORAL at 09:29

## 2023-07-23 RX ADMIN — ALLOPURINOL 100 MG: 100 TABLET ORAL at 09:29

## 2023-07-23 RX ADMIN — IPRATROPIUM BROMIDE 0.5 MG: 0.5 SOLUTION RESPIRATORY (INHALATION) at 09:42

## 2023-07-23 RX ADMIN — GABAPENTIN 300 MG: 300 CAPSULE ORAL at 14:18

## 2023-07-23 RX ADMIN — DICYCLOMINE HYDROCHLORIDE 40 MG: 10 CAPSULE ORAL at 20:51

## 2023-07-23 RX ADMIN — TOPIRAMATE 25 MG: 25 TABLET, FILM COATED ORAL at 09:29

## 2023-07-23 RX ADMIN — SODIUM CHLORIDE, PRESERVATIVE FREE 10 ML: 5 INJECTION INTRAVENOUS at 09:31

## 2023-07-23 RX ADMIN — DICYCLOMINE HYDROCHLORIDE 40 MG: 10 CAPSULE ORAL at 14:17

## 2023-07-23 RX ADMIN — GABAPENTIN 300 MG: 300 CAPSULE ORAL at 09:29

## 2023-07-23 RX ADMIN — GABAPENTIN 300 MG: 300 CAPSULE ORAL at 20:51

## 2023-07-23 RX ADMIN — ALPRAZOLAM 0.5 MG: 0.25 TABLET ORAL at 09:29

## 2023-07-23 RX ADMIN — IPRATROPIUM BROMIDE 0.5 MG: 0.5 SOLUTION RESPIRATORY (INHALATION) at 14:24

## 2023-07-23 RX ADMIN — ARFORMOTEROL TARTRATE 15 MCG: 15 SOLUTION RESPIRATORY (INHALATION) at 21:40

## 2023-07-23 RX ADMIN — METOPROLOL SUCCINATE 25 MG: 25 TABLET, EXTENDED RELEASE ORAL at 20:51

## 2023-07-23 ASSESSMENT — PAIN DESCRIPTION - ORIENTATION
ORIENTATION: RIGHT;LEFT
ORIENTATION: RIGHT;LEFT
ORIENTATION: RIGHT

## 2023-07-23 ASSESSMENT — PAIN SCALES - GENERAL
PAINLEVEL_OUTOF10: 3
PAINLEVEL_OUTOF10: 5
PAINLEVEL_OUTOF10: 4
PAINLEVEL_OUTOF10: 5

## 2023-07-23 ASSESSMENT — PAIN DESCRIPTION - DESCRIPTORS
DESCRIPTORS: ACHING
DESCRIPTORS: ACHING;DISCOMFORT

## 2023-07-23 ASSESSMENT — PAIN DESCRIPTION - LOCATION
LOCATION: BUTTOCKS
LOCATION: LEG;FOOT
LOCATION: LEG;FOOT

## 2023-07-23 ASSESSMENT — PAIN - FUNCTIONAL ASSESSMENT: PAIN_FUNCTIONAL_ASSESSMENT: PREVENTS OR INTERFERES SOME ACTIVE ACTIVITIES AND ADLS

## 2023-07-23 NOTE — PLAN OF CARE
Problem: Chronic Conditions and Co-morbidities  Goal: Patient's chronic conditions and co-morbidity symptoms are monitored and maintained or improved  7/23/2023 1136 by Lashawn Faye RN  Outcome: Progressing  7/23/2023 0300 by Clay Garcia RN  Outcome: Progressing  Flowsheets (Taken 7/22/2023 2120)  Care Plan - Patient's Chronic Conditions and Co-Morbidity Symptoms are Monitored and Maintained or Improved: Monitor and assess patient's chronic conditions and comorbid symptoms for stability, deterioration, or improvement     Problem: Discharge Planning  Goal: Discharge to home or other facility with appropriate resources  7/23/2023 1136 by Lashawn Faye RN  Outcome: Progressing  7/23/2023 0300 by Clay Garcia RN  Outcome: Progressing  Flowsheets (Taken 7/22/2023 2120)  Discharge to home or other facility with appropriate resources: Identify barriers to discharge with patient and caregiver     Problem: Pain  Goal: Verbalizes/displays adequate comfort level or baseline comfort level  7/23/2023 1136 by Lashawn Faye RN  Outcome: Progressing  7/23/2023 0300 by Clay Garcia RN  Outcome: Progressing     Problem: Skin/Tissue Integrity  Goal: Absence of new skin breakdown  Description: 1. Monitor for areas of redness and/or skin breakdown  2. Assess vascular access sites hourly  3. Every 4-6 hours minimum:  Change oxygen saturation probe site  4. Every 4-6 hours:  If on nasal continuous positive airway pressure, respiratory therapy assess nares and determine need for appliance change or resting period.   7/23/2023 1136 by Lashawn Faye RN  Outcome: Progressing  7/23/2023 0300 by Clay Garcia RN  Outcome: Progressing     Problem: Safety - Adult  Goal: Free from fall injury  7/23/2023 0300 by Clay Garcia RN  Outcome: Progressing

## 2023-07-24 LAB
ANION GAP SERPL CALCULATED.3IONS-SCNC: 12 MMOL/L (ref 7–16)
BUN SERPL-MCNC: 37 MG/DL (ref 6–23)
CALCIUM SERPL-MCNC: 9 MG/DL (ref 8.6–10.2)
CHLORIDE SERPL-SCNC: 104 MMOL/L (ref 98–107)
CO2 SERPL-SCNC: 27 MMOL/L (ref 22–29)
CREAT SERPL-MCNC: 2.2 MG/DL (ref 0.7–1.2)
ERYTHROCYTE [DISTWIDTH] IN BLOOD BY AUTOMATED COUNT: 15.3 % (ref 11.5–15)
GFR SERPL CREATININE-BSD FRML MDRD: 32 ML/MIN/1.73M2
GLUCOSE SERPL-MCNC: 83 MG/DL (ref 74–99)
HCT VFR BLD AUTO: 43.9 % (ref 37–54)
HGB BLD-MCNC: 13.5 G/DL (ref 12.5–16.5)
MCH RBC QN AUTO: 26.8 PG (ref 26–35)
MCHC RBC AUTO-ENTMCNC: 30.8 G/DL (ref 32–34.5)
MCV RBC AUTO: 87.3 FL (ref 80–99.9)
PLATELET # BLD AUTO: 269 K/UL (ref 130–450)
PMV BLD AUTO: 12.4 FL (ref 7–12)
POTASSIUM SERPL-SCNC: 3.9 MMOL/L (ref 3.5–5)
RBC # BLD AUTO: 5.03 M/UL (ref 3.8–5.8)
SODIUM SERPL-SCNC: 143 MMOL/L (ref 132–146)
WBC OTHER # BLD: 9.7 K/UL (ref 4.5–11.5)

## 2023-07-24 PROCEDURE — 2580000003 HC RX 258: Performed by: INTERNAL MEDICINE

## 2023-07-24 PROCEDURE — 2140000000 HC CCU INTERMEDIATE R&B

## 2023-07-24 PROCEDURE — 80048 BASIC METABOLIC PNL TOTAL CA: CPT

## 2023-07-24 PROCEDURE — 6360000002 HC RX W HCPCS: Performed by: INTERNAL MEDICINE

## 2023-07-24 PROCEDURE — 85027 COMPLETE CBC AUTOMATED: CPT

## 2023-07-24 PROCEDURE — 6370000000 HC RX 637 (ALT 250 FOR IP): Performed by: INTERNAL MEDICINE

## 2023-07-24 PROCEDURE — 94640 AIRWAY INHALATION TREATMENT: CPT

## 2023-07-24 PROCEDURE — 36415 COLL VENOUS BLD VENIPUNCTURE: CPT

## 2023-07-24 RX ADMIN — IPRATROPIUM BROMIDE 0.5 MG: 0.5 SOLUTION RESPIRATORY (INHALATION) at 10:03

## 2023-07-24 RX ADMIN — GABAPENTIN 300 MG: 300 CAPSULE ORAL at 14:37

## 2023-07-24 RX ADMIN — ARFORMOTEROL TARTRATE 15 MCG: 15 SOLUTION RESPIRATORY (INHALATION) at 21:26

## 2023-07-24 RX ADMIN — GABAPENTIN 300 MG: 300 CAPSULE ORAL at 09:35

## 2023-07-24 RX ADMIN — DICYCLOMINE HYDROCHLORIDE 40 MG: 10 CAPSULE ORAL at 09:34

## 2023-07-24 RX ADMIN — OXYCODONE HYDROCHLORIDE 5 MG: 5 TABLET ORAL at 12:35

## 2023-07-24 RX ADMIN — IPRATROPIUM BROMIDE 0.5 MG: 0.5 SOLUTION RESPIRATORY (INHALATION) at 13:27

## 2023-07-24 RX ADMIN — IPRATROPIUM BROMIDE 0.5 MG: 0.5 SOLUTION RESPIRATORY (INHALATION) at 17:07

## 2023-07-24 RX ADMIN — ALLOPURINOL 100 MG: 100 TABLET ORAL at 09:34

## 2023-07-24 RX ADMIN — DICYCLOMINE HYDROCHLORIDE 40 MG: 10 CAPSULE ORAL at 14:37

## 2023-07-24 RX ADMIN — GABAPENTIN 300 MG: 300 CAPSULE ORAL at 23:51

## 2023-07-24 RX ADMIN — DOCUSATE SODIUM 100 MG: 100 CAPSULE, LIQUID FILLED ORAL at 09:34

## 2023-07-24 RX ADMIN — DICYCLOMINE HYDROCHLORIDE 40 MG: 10 CAPSULE ORAL at 23:51

## 2023-07-24 RX ADMIN — OXYCODONE HYDROCHLORIDE 5 MG: 5 TABLET ORAL at 23:51

## 2023-07-24 RX ADMIN — BUDESONIDE 250 MCG: 0.25 SUSPENSION RESPIRATORY (INHALATION) at 10:03

## 2023-07-24 RX ADMIN — METOPROLOL SUCCINATE 25 MG: 25 TABLET, EXTENDED RELEASE ORAL at 09:34

## 2023-07-24 RX ADMIN — BUDESONIDE 250 MCG: 0.25 SUSPENSION RESPIRATORY (INHALATION) at 21:27

## 2023-07-24 RX ADMIN — METOPROLOL SUCCINATE 25 MG: 25 TABLET, EXTENDED RELEASE ORAL at 23:51

## 2023-07-24 RX ADMIN — ARFORMOTEROL TARTRATE 15 MCG: 15 SOLUTION RESPIRATORY (INHALATION) at 10:03

## 2023-07-24 RX ADMIN — TOPIRAMATE 25 MG: 25 TABLET, FILM COATED ORAL at 09:34

## 2023-07-24 RX ADMIN — IPRATROPIUM BROMIDE 0.5 MG: 0.5 SOLUTION RESPIRATORY (INHALATION) at 21:26

## 2023-07-24 ASSESSMENT — PAIN SCALES - GENERAL
PAINLEVEL_OUTOF10: 6
PAINLEVEL_OUTOF10: 3
PAINLEVEL_OUTOF10: 0
PAINLEVEL_OUTOF10: 8

## 2023-07-24 ASSESSMENT — PAIN DESCRIPTION - LOCATION
LOCATION: LEG

## 2023-07-24 ASSESSMENT — PAIN - FUNCTIONAL ASSESSMENT: PAIN_FUNCTIONAL_ASSESSMENT: PREVENTS OR INTERFERES WITH MANY ACTIVE NOT PASSIVE ACTIVITIES

## 2023-07-24 ASSESSMENT — PAIN DESCRIPTION - DESCRIPTORS
DESCRIPTORS: DISCOMFORT;SHOOTING;ACHING
DESCRIPTORS: DISCOMFORT;ACHING

## 2023-07-24 ASSESSMENT — PAIN DESCRIPTION - ORIENTATION
ORIENTATION: RIGHT
ORIENTATION: RIGHT;LEFT
ORIENTATION: RIGHT;LEFT

## 2023-07-24 NOTE — CARE COORDINATION
Care Coordination: 74year old male admitted with inability to walk and has   thoracic hematomyelia. He was recently discharged on 7/14 and was to start some home physical therapy but began to rapidly decline. ER has consulted with neurosurgery at [facility] and patient is waiting a bed for transfer there. Per [facility] this am, no bed yet. Next update due  at 3 pm.  Met with patient    He lives with wife. He has a walker, bsc and shower chair. Hx with MYLENE Cliare. His PCP is Dr Jerome Patient @ [facility] [address] [facility] or [facility] for mail order. He is aware of recommendation to transfer. Will follow. No needs at this time.

## 2023-07-25 LAB
ANION GAP SERPL CALCULATED.3IONS-SCNC: 13 MMOL/L (ref 7–16)
BUN SERPL-MCNC: 34 MG/DL (ref 6–23)
CALCIUM SERPL-MCNC: 8.9 MG/DL (ref 8.6–10.2)
CHLORIDE SERPL-SCNC: 104 MMOL/L (ref 98–107)
CO2 SERPL-SCNC: 26 MMOL/L (ref 22–29)
CREAT SERPL-MCNC: 2 MG/DL (ref 0.7–1.2)
EKG ATRIAL RATE: 326 BPM
EKG Q-T INTERVAL: 388 MS
EKG QRS DURATION: 94 MS
EKG QTC CALCULATION (BAZETT): 479 MS
EKG R AXIS: 52 DEGREES
EKG T AXIS: 17 DEGREES
EKG VENTRICULAR RATE: 92 BPM
ERYTHROCYTE [DISTWIDTH] IN BLOOD BY AUTOMATED COUNT: 15.4 % (ref 11.5–15)
GFR SERPL CREATININE-BSD FRML MDRD: 36 ML/MIN/1.73M2
GLUCOSE SERPL-MCNC: 81 MG/DL (ref 74–99)
HCT VFR BLD AUTO: 44 % (ref 37–54)
HGB BLD-MCNC: 13.6 G/DL (ref 12.5–16.5)
MCH RBC QN AUTO: 26.8 PG (ref 26–35)
MCHC RBC AUTO-ENTMCNC: 30.9 G/DL (ref 32–34.5)
MCV RBC AUTO: 86.8 FL (ref 80–99.9)
PLATELET # BLD AUTO: 309 K/UL (ref 130–450)
PMV BLD AUTO: 12 FL (ref 7–12)
POTASSIUM SERPL-SCNC: 3.7 MMOL/L (ref 3.5–5)
RBC # BLD AUTO: 5.07 M/UL (ref 3.8–5.8)
SODIUM SERPL-SCNC: 143 MMOL/L (ref 132–146)
WBC OTHER # BLD: 9.9 K/UL (ref 4.5–11.5)

## 2023-07-25 PROCEDURE — 94640 AIRWAY INHALATION TREATMENT: CPT

## 2023-07-25 PROCEDURE — 85027 COMPLETE CBC AUTOMATED: CPT

## 2023-07-25 PROCEDURE — 80048 BASIC METABOLIC PNL TOTAL CA: CPT

## 2023-07-25 PROCEDURE — 93010 ELECTROCARDIOGRAM REPORT: CPT | Performed by: INTERNAL MEDICINE

## 2023-07-25 PROCEDURE — 6360000002 HC RX W HCPCS: Performed by: INTERNAL MEDICINE

## 2023-07-25 PROCEDURE — 36415 COLL VENOUS BLD VENIPUNCTURE: CPT

## 2023-07-25 PROCEDURE — 2580000003 HC RX 258: Performed by: INTERNAL MEDICINE

## 2023-07-25 PROCEDURE — 2140000000 HC CCU INTERMEDIATE R&B

## 2023-07-25 PROCEDURE — 6370000000 HC RX 637 (ALT 250 FOR IP): Performed by: INTERNAL MEDICINE

## 2023-07-25 RX ADMIN — ARFORMOTEROL TARTRATE 15 MCG: 15 SOLUTION RESPIRATORY (INHALATION) at 09:02

## 2023-07-25 RX ADMIN — DOCUSATE SODIUM 100 MG: 100 CAPSULE, LIQUID FILLED ORAL at 09:39

## 2023-07-25 RX ADMIN — GABAPENTIN 300 MG: 300 CAPSULE ORAL at 12:50

## 2023-07-25 RX ADMIN — DICYCLOMINE HYDROCHLORIDE 40 MG: 10 CAPSULE ORAL at 09:39

## 2023-07-25 RX ADMIN — METOPROLOL SUCCINATE 25 MG: 25 TABLET, EXTENDED RELEASE ORAL at 09:39

## 2023-07-25 RX ADMIN — METOPROLOL SUCCINATE 25 MG: 25 TABLET, EXTENDED RELEASE ORAL at 21:30

## 2023-07-25 RX ADMIN — IPRATROPIUM BROMIDE 0.5 MG: 0.5 SOLUTION RESPIRATORY (INHALATION) at 19:31

## 2023-07-25 RX ADMIN — SODIUM CHLORIDE, PRESERVATIVE FREE 10 ML: 5 INJECTION INTRAVENOUS at 21:30

## 2023-07-25 RX ADMIN — GABAPENTIN 300 MG: 300 CAPSULE ORAL at 09:39

## 2023-07-25 RX ADMIN — IPRATROPIUM BROMIDE 0.5 MG: 0.5 SOLUTION RESPIRATORY (INHALATION) at 09:02

## 2023-07-25 RX ADMIN — IPRATROPIUM BROMIDE 0.5 MG: 0.5 SOLUTION RESPIRATORY (INHALATION) at 15:31

## 2023-07-25 RX ADMIN — ARFORMOTEROL TARTRATE 15 MCG: 15 SOLUTION RESPIRATORY (INHALATION) at 19:31

## 2023-07-25 RX ADMIN — BUDESONIDE 250 MCG: 0.25 SUSPENSION RESPIRATORY (INHALATION) at 19:31

## 2023-07-25 RX ADMIN — GABAPENTIN 300 MG: 300 CAPSULE ORAL at 21:30

## 2023-07-25 RX ADMIN — DICYCLOMINE HYDROCHLORIDE 40 MG: 10 CAPSULE ORAL at 12:50

## 2023-07-25 RX ADMIN — IPRATROPIUM BROMIDE 0.5 MG: 0.5 SOLUTION RESPIRATORY (INHALATION) at 12:00

## 2023-07-25 RX ADMIN — ALLOPURINOL 100 MG: 100 TABLET ORAL at 09:39

## 2023-07-25 RX ADMIN — DICYCLOMINE HYDROCHLORIDE 40 MG: 10 CAPSULE ORAL at 21:30

## 2023-07-25 RX ADMIN — TOPIRAMATE 25 MG: 25 TABLET, FILM COATED ORAL at 09:39

## 2023-07-25 RX ADMIN — CYCLOBENZAPRINE 10 MG: 10 TABLET, FILM COATED ORAL at 17:32

## 2023-07-25 RX ADMIN — BUDESONIDE 250 MCG: 0.25 SUSPENSION RESPIRATORY (INHALATION) at 09:02

## 2023-07-25 ASSESSMENT — PAIN DESCRIPTION - DESCRIPTORS
DESCRIPTORS: SHOOTING;STABBING;ACHING
DESCRIPTORS: ACHING

## 2023-07-25 ASSESSMENT — PAIN DESCRIPTION - LOCATION
LOCATION: LEG
LOCATION: LEG

## 2023-07-25 ASSESSMENT — PAIN SCALES - GENERAL
PAINLEVEL_OUTOF10: 0
PAINLEVEL_OUTOF10: 3
PAINLEVEL_OUTOF10: 8

## 2023-07-25 ASSESSMENT — PAIN DESCRIPTION - ORIENTATION
ORIENTATION: RIGHT
ORIENTATION: RIGHT;LEFT

## 2023-07-25 ASSESSMENT — PAIN - FUNCTIONAL ASSESSMENT: PAIN_FUNCTIONAL_ASSESSMENT: PREVENTS OR INTERFERES SOME ACTIVE ACTIVITIES AND ADLS

## 2023-07-26 VITALS
HEIGHT: 67 IN | TEMPERATURE: 98.6 F | BODY MASS INDEX: 43.16 KG/M2 | OXYGEN SATURATION: 94 % | DIASTOLIC BLOOD PRESSURE: 76 MMHG | SYSTOLIC BLOOD PRESSURE: 136 MMHG | WEIGHT: 275 LBS | HEART RATE: 90 BPM | RESPIRATION RATE: 16 BRPM

## 2023-07-26 PROCEDURE — 2140000000 HC CCU INTERMEDIATE R&B

## 2023-07-26 PROCEDURE — 6370000000 HC RX 637 (ALT 250 FOR IP): Performed by: INTERNAL MEDICINE

## 2023-07-26 PROCEDURE — 2580000003 HC RX 258: Performed by: INTERNAL MEDICINE

## 2023-07-26 PROCEDURE — 6360000002 HC RX W HCPCS: Performed by: INTERNAL MEDICINE

## 2023-07-26 PROCEDURE — 94640 AIRWAY INHALATION TREATMENT: CPT

## 2023-07-26 RX ADMIN — OXYCODONE HYDROCHLORIDE 5 MG: 5 TABLET ORAL at 09:45

## 2023-07-26 RX ADMIN — DICYCLOMINE HYDROCHLORIDE 40 MG: 10 CAPSULE ORAL at 13:06

## 2023-07-26 RX ADMIN — OXYCODONE HYDROCHLORIDE 5 MG: 5 TABLET ORAL at 01:27

## 2023-07-26 RX ADMIN — ARFORMOTEROL TARTRATE 15 MCG: 15 SOLUTION RESPIRATORY (INHALATION) at 18:45

## 2023-07-26 RX ADMIN — DICYCLOMINE HYDROCHLORIDE 40 MG: 10 CAPSULE ORAL at 21:03

## 2023-07-26 RX ADMIN — GABAPENTIN 300 MG: 300 CAPSULE ORAL at 21:03

## 2023-07-26 RX ADMIN — IPRATROPIUM BROMIDE 0.5 MG: 0.5 SOLUTION RESPIRATORY (INHALATION) at 10:01

## 2023-07-26 RX ADMIN — SODIUM CHLORIDE, PRESERVATIVE FREE 10 ML: 5 INJECTION INTRAVENOUS at 08:20

## 2023-07-26 RX ADMIN — METOPROLOL SUCCINATE 25 MG: 25 TABLET, EXTENDED RELEASE ORAL at 21:05

## 2023-07-26 RX ADMIN — METOPROLOL SUCCINATE 25 MG: 25 TABLET, EXTENDED RELEASE ORAL at 08:19

## 2023-07-26 RX ADMIN — BUDESONIDE 250 MCG: 0.25 SUSPENSION RESPIRATORY (INHALATION) at 10:01

## 2023-07-26 RX ADMIN — OXYCODONE HYDROCHLORIDE 5 MG: 5 TABLET ORAL at 21:03

## 2023-07-26 RX ADMIN — DICYCLOMINE HYDROCHLORIDE 40 MG: 10 CAPSULE ORAL at 08:18

## 2023-07-26 RX ADMIN — GABAPENTIN 300 MG: 300 CAPSULE ORAL at 13:06

## 2023-07-26 RX ADMIN — TOPIRAMATE 25 MG: 25 TABLET, FILM COATED ORAL at 08:20

## 2023-07-26 RX ADMIN — ARFORMOTEROL TARTRATE 15 MCG: 15 SOLUTION RESPIRATORY (INHALATION) at 10:01

## 2023-07-26 RX ADMIN — IPRATROPIUM BROMIDE 0.5 MG: 0.5 SOLUTION RESPIRATORY (INHALATION) at 13:48

## 2023-07-26 RX ADMIN — IPRATROPIUM BROMIDE 0.5 MG: 0.5 SOLUTION RESPIRATORY (INHALATION) at 18:45

## 2023-07-26 RX ADMIN — DOCUSATE SODIUM 100 MG: 100 CAPSULE, LIQUID FILLED ORAL at 08:20

## 2023-07-26 RX ADMIN — ALLOPURINOL 100 MG: 100 TABLET ORAL at 08:20

## 2023-07-26 RX ADMIN — BUDESONIDE 250 MCG: 0.25 SUSPENSION RESPIRATORY (INHALATION) at 18:45

## 2023-07-26 RX ADMIN — GABAPENTIN 300 MG: 300 CAPSULE ORAL at 08:19

## 2023-07-26 RX ADMIN — IPRATROPIUM BROMIDE 0.5 MG: 0.5 SOLUTION RESPIRATORY (INHALATION) at 15:40

## 2023-07-26 ASSESSMENT — PAIN DESCRIPTION - LOCATION
LOCATION: LEG
LOCATION: LEG
LOCATION: BACK

## 2023-07-26 ASSESSMENT — PAIN DESCRIPTION - DESCRIPTORS
DESCRIPTORS: SORE
DESCRIPTORS: THROBBING;SQUEEZING;STABBING

## 2023-07-26 ASSESSMENT — PAIN DESCRIPTION - ORIENTATION
ORIENTATION: LEFT;RIGHT
ORIENTATION: RIGHT

## 2023-07-26 ASSESSMENT — PAIN SCALES - GENERAL
PAINLEVEL_OUTOF10: 7
PAINLEVEL_OUTOF10: 8
PAINLEVEL_OUTOF10: 8

## 2023-07-26 NOTE — PLAN OF CARE
Problem: Chronic Conditions and Co-morbidities  Goal: Patient's chronic conditions and co-morbidity symptoms are monitored and maintained or improved  7/25/2023 2241 by Jaylin Gr RN  Outcome: Progressing  7/25/2023 1026 by Claudia Enriquez LPN  Outcome: Progressing     Problem: Discharge Planning  Goal: Discharge to home or other facility with appropriate resources  7/25/2023 2241 by Jaylin Gr RN  Outcome: Progressing  7/25/2023 1026 by Claudia Enriquez LPN  Outcome: Progressing     Problem: Pain  Goal: Verbalizes/displays adequate comfort level or baseline comfort level  7/25/2023 2241 by Jaylin Gr RN  Outcome: Progressing  7/25/2023 1026 by Claudia Enriquez LPN  Outcome: Progressing     Problem: Skin/Tissue Integrity  Goal: Absence of new skin breakdown  Description: 1. Monitor for areas of redness and/or skin breakdown  2. Assess vascular access sites hourly  3. Every 4-6 hours minimum:  Change oxygen saturation probe site  4. Every 4-6 hours:  If on nasal continuous positive airway pressure, respiratory therapy assess nares and determine need for appliance change or resting period.   7/25/2023 2241 by Jaylin Gr RN  Outcome: Progressing  7/25/2023 1026 by Claudia Enriquez LPN  Outcome: Progressing     Problem: Safety - Adult  Goal: Free from fall injury  7/25/2023 2241 by Jaylin Gr RN  Outcome: Progressing  7/25/2023 1026 by Claudia Enriquez LPN  Outcome: Progressing

## 2023-07-26 NOTE — DISCHARGE INSTR - COC
Continuity of Care Form    Patient Name: Darlynn Hatchet   :  1955  MRN:  [id number]    Admit date:  2023  Discharge date:  2023    Code Status Order: Full Code   Advance Directives:     Admitting Physician:  Alexia Malin MD  PCP: Valencia Michelle MD    Discharging Nurse: PERSON [facility] Unit/Room#: 2251/3977-L  Discharging Unit Phone Number: [phone]    Emergency Contact:   Extended Emergency Contact Information  Primary Emergency Contact: [address] [address] Phone: [phone]  Mobile Phone: [phone]  Relation: Spouse  Preferred language: English   needed?  No  Secondary Emergency Contact: [address] [facility] Phone: [phone]  Relation: Brother/Sister    Past Surgical History:  Past Surgical History:   Procedure Laterality Date    COLONOSCOPY      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    DIALYSIS FISTULA CREATION Left 2021    CREATION AV FISTULA LEFT ARM performed by Katie Guido MD at [facility] Left 2021    REVISION ARTERIOVENOUS FISTULA LEFT ARM performed by Katie Guido MD at [address] [place] [address]  2002    lap tricia    UPPER GASTROINTESTINAL ENDOSCOPY N/A 2019    EGD FOREIGN BODY REMOVAL performed by Adithya Lopez MD at [address] [place] [address],[address] Right 3/27/2023    VENA CAVA FILTER INSERTION performed by Lakeisha Aguirre MD at [facility]       Immunization History:   Immunization History   Administered Date(s) Administered    Influenza Virus Vaccine 10/12/2016, 10/16/2018, 2021    Pneumococcal, PPSV23, PNEUMOVAX 23, (age 2y+), SC/IM, 0.5mL 2015, 2016       Active Problems:  Patient Active Problem List   Diagnosis Code    Foreign body in esophagus T18.108A    Encounter regarding vascular access for dialysis for ESRD ([address]) N18.6, Z99.2    CKD (chronic kidney disease) stage 4, GFR 15-29 ml/min ([facility]) N18.4    AVF (arteriovenous fistula) ([address])

## 2023-07-27 NOTE — PLAN OF CARE
Problem: Chronic Conditions and Co-morbidities  Goal: Patient's chronic conditions and co-morbidity symptoms are monitored and maintained or improved  7/26/2023 2149 by Di Pierce RN  Outcome: Progressing  7/26/2023 0954 by Caroline Nix LPN  Outcome: Progressing     Problem: Discharge Planning  Goal: Discharge to home or other facility with appropriate resources  7/26/2023 2149 by Di Pierce RN  Outcome: Progressing  7/26/2023 0954 by Caroline Nix LPN  Outcome: Progressing     Problem: Pain  Goal: Verbalizes/displays adequate comfort level or baseline comfort level  7/26/2023 2149 by Di Pierce RN  Outcome: Progressing  7/26/2023 0954 by Caroline Nix LPN  Outcome: Progressing     Problem: Skin/Tissue Integrity  Goal: Absence of new skin breakdown  Description: 1. Monitor for areas of redness and/or skin breakdown  2. Assess vascular access sites hourly  3. Every 4-6 hours minimum:  Change oxygen saturation probe site  4. Every 4-6 hours:  If on nasal continuous positive airway pressure, respiratory therapy assess nares and determine need for appliance change or resting period.   7/26/2023 2149 by Di Pierce RN  Outcome: Progressing  7/26/2023 0954 by Caroline Nix LPN  Outcome: Progressing     Problem: Safety - Adult  Goal: Free from fall injury  7/26/2023 2149 by Di Pierce RN  Outcome: Progressing  7/26/2023 0954 by Caroline Nix LPN  Outcome: Progressing

## 2023-08-07 PROBLEM — I48.19 PERSISTENT ATRIAL FIBRILLATION (HCC): Status: ACTIVE | Noted: 2023-03-27

## 2023-08-07 PROBLEM — E66.01 MORBID OBESITY (HCC): Status: ACTIVE | Noted: 2017-12-28

## 2023-10-11 ENCOUNTER — HOSPITAL ENCOUNTER (INPATIENT)
Age: 68
LOS: 5 days | Discharge: OTHER INSTITUTION | DRG: 641 | End: 2023-10-16
Attending: EMERGENCY MEDICINE | Admitting: HOSPITALIST
Payer: MEDICARE

## 2023-10-11 DIAGNOSIS — F41.9 ANXIETY: ICD-10-CM

## 2023-10-11 DIAGNOSIS — E87.6 HYPOKALEMIA: Primary | ICD-10-CM

## 2023-10-11 PROBLEM — M21.371 BILATERAL FOOT-DROP: Chronic | Status: ACTIVE | Noted: 2023-10-11

## 2023-10-11 PROBLEM — E66.813 CLASS 3 SEVERE OBESITY WITH SERIOUS COMORBIDITY AND BODY MASS INDEX (BMI) OF 40.0 TO 44.9 IN ADULT: Status: ACTIVE | Noted: 2017-12-28

## 2023-10-11 PROBLEM — Z87.19 HISTORY OF LOWER GI BLEEDING: Status: ACTIVE | Noted: 2023-10-11

## 2023-10-11 PROBLEM — Z86.718 HISTORY OF DVT (DEEP VEIN THROMBOSIS): Status: ACTIVE | Noted: 2023-03-12

## 2023-10-11 PROBLEM — M21.372 BILATERAL FOOT-DROP: Chronic | Status: ACTIVE | Noted: 2023-10-11

## 2023-10-11 LAB
ALBUMIN SERPL-MCNC: 3.9 G/DL (ref 3.5–5.2)
ALP SERPL-CCNC: 96 U/L (ref 40–129)
ALT SERPL-CCNC: 37 U/L (ref 0–40)
ANION GAP SERPL CALCULATED.3IONS-SCNC: 11 MMOL/L (ref 7–16)
ANION GAP SERPL CALCULATED.3IONS-SCNC: 18 MMOL/L (ref 7–16)
AST SERPL-CCNC: 34 U/L (ref 0–39)
BASOPHILS # BLD: 0.16 K/UL (ref 0–0.2)
BASOPHILS NFR BLD: 1 % (ref 0–2)
BILIRUB SERPL-MCNC: 0.6 MG/DL (ref 0–1.2)
BUN SERPL-MCNC: 66 MG/DL (ref 6–23)
BUN SERPL-MCNC: 67 MG/DL (ref 6–23)
CALCIUM SERPL-MCNC: 9.3 MG/DL (ref 8.6–10.2)
CALCIUM SERPL-MCNC: 9.7 MG/DL (ref 8.6–10.2)
CHLORIDE SERPL-SCNC: 85 MMOL/L (ref 98–107)
CHLORIDE SERPL-SCNC: 85 MMOL/L (ref 98–107)
CO2 SERPL-SCNC: 35 MMOL/L (ref 22–29)
CO2 SERPL-SCNC: 42 MMOL/L (ref 22–29)
CREAT SERPL-MCNC: 2.1 MG/DL (ref 0.7–1.2)
CREAT SERPL-MCNC: 2.1 MG/DL (ref 0.7–1.2)
EKG ATRIAL RATE: 55 BPM
EKG Q-T INTERVAL: 388 MS
EKG QRS DURATION: 122 MS
EKG QTC CALCULATION (BAZETT): 419 MS
EKG R AXIS: 20 DEGREES
EKG T AXIS: -67 DEGREES
EKG VENTRICULAR RATE: 70 BPM
EOSINOPHIL # BLD: 0.51 K/UL (ref 0.05–0.5)
EOSINOPHILS RELATIVE PERCENT: 5 % (ref 0–6)
ERYTHROCYTE [DISTWIDTH] IN BLOOD BY AUTOMATED COUNT: 15.9 % (ref 11.5–15)
GFR SERPL CREATININE-BSD FRML MDRD: 34 ML/MIN/1.73M2
GFR SERPL CREATININE-BSD FRML MDRD: 35 ML/MIN/1.73M2
GLUCOSE SERPL-MCNC: 106 MG/DL (ref 74–99)
GLUCOSE SERPL-MCNC: 116 MG/DL (ref 74–99)
HCT VFR BLD AUTO: 39.1 % (ref 37–54)
HGB BLD-MCNC: 12.7 G/DL (ref 12.5–16.5)
IMM GRANULOCYTES # BLD AUTO: 0.04 K/UL (ref 0–0.58)
IMM GRANULOCYTES NFR BLD: 0 % (ref 0–5)
LYMPHOCYTES NFR BLD: 1.2 K/UL (ref 1.5–4)
LYMPHOCYTES RELATIVE PERCENT: 11 % (ref 20–42)
MAGNESIUM SERPL-MCNC: 2.4 MG/DL (ref 1.6–2.6)
MCH RBC QN AUTO: 28.5 PG (ref 26–35)
MCHC RBC AUTO-ENTMCNC: 32.5 G/DL (ref 32–34.5)
MCV RBC AUTO: 87.9 FL (ref 80–99.9)
MONOCYTES NFR BLD: 1.18 K/UL (ref 0.1–0.95)
MONOCYTES NFR BLD: 10 % (ref 2–12)
NEUTROPHILS NFR BLD: 73 % (ref 43–80)
NEUTS SEG NFR BLD: 8.21 K/UL (ref 1.8–7.3)
PLATELET # BLD AUTO: 316 K/UL (ref 130–450)
PMV BLD AUTO: 11.9 FL (ref 7–12)
POTASSIUM SERPL-SCNC: 2 MMOL/L (ref 3.5–5)
POTASSIUM SERPL-SCNC: 2.5 MMOL/L (ref 3.5–5)
PROT SERPL-MCNC: 6.5 G/DL (ref 6.4–8.3)
RBC # BLD AUTO: 4.45 M/UL (ref 3.8–5.8)
SODIUM SERPL-SCNC: 138 MMOL/L (ref 132–146)
SODIUM SERPL-SCNC: 138 MMOL/L (ref 132–146)
WBC OTHER # BLD: 11.3 K/UL (ref 4.5–11.5)

## 2023-10-11 PROCEDURE — 83735 ASSAY OF MAGNESIUM: CPT

## 2023-10-11 PROCEDURE — 99285 EMERGENCY DEPT VISIT HI MDM: CPT

## 2023-10-11 PROCEDURE — 6360000002 HC RX W HCPCS: Performed by: HOSPITALIST

## 2023-10-11 PROCEDURE — 85025 COMPLETE CBC W/AUTO DIFF WBC: CPT

## 2023-10-11 PROCEDURE — 2140000000 HC CCU INTERMEDIATE R&B

## 2023-10-11 PROCEDURE — 93010 ELECTROCARDIOGRAM REPORT: CPT | Performed by: INTERNAL MEDICINE

## 2023-10-11 PROCEDURE — 93005 ELECTROCARDIOGRAM TRACING: CPT

## 2023-10-11 PROCEDURE — 94664 DEMO&/EVAL PT USE INHALER: CPT

## 2023-10-11 PROCEDURE — 94640 AIRWAY INHALATION TREATMENT: CPT

## 2023-10-11 PROCEDURE — 6370000000 HC RX 637 (ALT 250 FOR IP)

## 2023-10-11 PROCEDURE — 6360000002 HC RX W HCPCS

## 2023-10-11 PROCEDURE — 2580000003 HC RX 258: Performed by: HOSPITALIST

## 2023-10-11 PROCEDURE — 80053 COMPREHEN METABOLIC PANEL: CPT

## 2023-10-11 PROCEDURE — 80048 BASIC METABOLIC PNL TOTAL CA: CPT

## 2023-10-11 PROCEDURE — 6370000000 HC RX 637 (ALT 250 FOR IP): Performed by: HOSPITALIST

## 2023-10-11 RX ORDER — DICYCLOMINE HYDROCHLORIDE 10 MG/1
40 CAPSULE ORAL 3 TIMES DAILY
Status: DISCONTINUED | OUTPATIENT
Start: 2023-10-11 | End: 2023-10-16 | Stop reason: HOSPADM

## 2023-10-11 RX ORDER — POTASSIUM CHLORIDE 7.45 MG/ML
10 INJECTION INTRAVENOUS ONCE
Status: COMPLETED | OUTPATIENT
Start: 2023-10-11 | End: 2023-10-11

## 2023-10-11 RX ORDER — ONDANSETRON 2 MG/ML
4 INJECTION INTRAMUSCULAR; INTRAVENOUS EVERY 6 HOURS PRN
Status: DISCONTINUED | OUTPATIENT
Start: 2023-10-11 | End: 2023-10-16 | Stop reason: HOSPADM

## 2023-10-11 RX ORDER — POTASSIUM CHLORIDE 20 MEQ/1
40 TABLET, EXTENDED RELEASE ORAL PRN
Status: DISCONTINUED | OUTPATIENT
Start: 2023-10-11 | End: 2023-10-16 | Stop reason: HOSPADM

## 2023-10-11 RX ORDER — SODIUM CHLORIDE 0.9 % (FLUSH) 0.9 %
5-40 SYRINGE (ML) INJECTION EVERY 12 HOURS SCHEDULED
Status: DISCONTINUED | OUTPATIENT
Start: 2023-10-11 | End: 2023-10-16 | Stop reason: HOSPADM

## 2023-10-11 RX ORDER — ACETAMINOPHEN 650 MG/1
650 SUPPOSITORY RECTAL EVERY 6 HOURS PRN
Status: DISCONTINUED | OUTPATIENT
Start: 2023-10-11 | End: 2023-10-16 | Stop reason: HOSPADM

## 2023-10-11 RX ORDER — ALLOPURINOL 100 MG/1
100 TABLET ORAL DAILY
Status: DISCONTINUED | OUTPATIENT
Start: 2023-10-12 | End: 2023-10-16 | Stop reason: HOSPADM

## 2023-10-11 RX ORDER — POTASSIUM CHLORIDE 7.45 MG/ML
10 INJECTION INTRAVENOUS PRN
Status: DISCONTINUED | OUTPATIENT
Start: 2023-10-11 | End: 2023-10-16 | Stop reason: HOSPADM

## 2023-10-11 RX ORDER — CYCLOBENZAPRINE HCL 10 MG
10 TABLET ORAL NIGHTLY PRN
Status: DISCONTINUED | OUTPATIENT
Start: 2023-10-11 | End: 2023-10-16 | Stop reason: HOSPADM

## 2023-10-11 RX ORDER — POLYETHYLENE GLYCOL 3350 17 G/17G
17 POWDER, FOR SOLUTION ORAL DAILY PRN
Status: DISCONTINUED | OUTPATIENT
Start: 2023-10-11 | End: 2023-10-16 | Stop reason: HOSPADM

## 2023-10-11 RX ORDER — SODIUM CHLORIDE 0.9 % (FLUSH) 0.9 %
5-40 SYRINGE (ML) INJECTION PRN
Status: DISCONTINUED | OUTPATIENT
Start: 2023-10-11 | End: 2023-10-16 | Stop reason: HOSPADM

## 2023-10-11 RX ORDER — DOCUSATE SODIUM 100 MG/1
100 CAPSULE, LIQUID FILLED ORAL DAILY
Status: DISCONTINUED | OUTPATIENT
Start: 2023-10-11 | End: 2023-10-16 | Stop reason: HOSPADM

## 2023-10-11 RX ORDER — ARFORMOTEROL TARTRATE 15 UG/2ML
15 SOLUTION RESPIRATORY (INHALATION)
Status: DISCONTINUED | OUTPATIENT
Start: 2023-10-11 | End: 2023-10-16 | Stop reason: HOSPADM

## 2023-10-11 RX ORDER — ALPRAZOLAM 0.25 MG/1
0.5 TABLET ORAL DAILY PRN
Status: DISCONTINUED | OUTPATIENT
Start: 2023-10-11 | End: 2023-10-12

## 2023-10-11 RX ORDER — ONDANSETRON 4 MG/1
4 TABLET, ORALLY DISINTEGRATING ORAL EVERY 8 HOURS PRN
Status: DISCONTINUED | OUTPATIENT
Start: 2023-10-11 | End: 2023-10-16 | Stop reason: HOSPADM

## 2023-10-11 RX ORDER — ACETAMINOPHEN 325 MG/1
650 TABLET ORAL EVERY 6 HOURS PRN
Status: DISCONTINUED | OUTPATIENT
Start: 2023-10-11 | End: 2023-10-16 | Stop reason: HOSPADM

## 2023-10-11 RX ORDER — SODIUM CHLORIDE 9 MG/ML
INJECTION, SOLUTION INTRAVENOUS PRN
Status: DISCONTINUED | OUTPATIENT
Start: 2023-10-11 | End: 2023-10-16 | Stop reason: HOSPADM

## 2023-10-11 RX ORDER — METOPROLOL SUCCINATE 25 MG/1
25 TABLET, EXTENDED RELEASE ORAL 2 TIMES DAILY
Status: DISCONTINUED | OUTPATIENT
Start: 2023-10-11 | End: 2023-10-16 | Stop reason: HOSPADM

## 2023-10-11 RX ORDER — TOPIRAMATE 25 MG/1
25 TABLET ORAL DAILY
Status: DISCONTINUED | OUTPATIENT
Start: 2023-10-12 | End: 2023-10-16 | Stop reason: HOSPADM

## 2023-10-11 RX ORDER — BUDESONIDE 0.25 MG/2ML
250 INHALANT ORAL 2 TIMES DAILY
Status: DISCONTINUED | OUTPATIENT
Start: 2023-10-11 | End: 2023-10-16 | Stop reason: HOSPADM

## 2023-10-11 RX ORDER — FUROSEMIDE 40 MG/1
40 TABLET ORAL 2 TIMES DAILY
Status: DISCONTINUED | OUTPATIENT
Start: 2023-10-12 | End: 2023-10-12

## 2023-10-11 RX ORDER — GABAPENTIN 300 MG/1
300 CAPSULE ORAL 3 TIMES DAILY
Status: DISCONTINUED | OUTPATIENT
Start: 2023-10-11 | End: 2023-10-16 | Stop reason: HOSPADM

## 2023-10-11 RX ADMIN — ARFORMOTEROL TARTRATE 15 MCG: 15 SOLUTION RESPIRATORY (INHALATION) at 20:31

## 2023-10-11 RX ADMIN — DICYCLOMINE HYDROCHLORIDE 40 MG: 10 CAPSULE ORAL at 20:37

## 2023-10-11 RX ADMIN — GABAPENTIN 300 MG: 300 CAPSULE ORAL at 20:36

## 2023-10-11 RX ADMIN — METOPROLOL SUCCINATE 25 MG: 25 TABLET, EXTENDED RELEASE ORAL at 20:36

## 2023-10-11 RX ADMIN — POTASSIUM CHLORIDE 10 MEQ: 7.46 INJECTION, SOLUTION INTRAVENOUS at 21:15

## 2023-10-11 RX ADMIN — POTASSIUM CHLORIDE 10 MEQ: 7.46 INJECTION, SOLUTION INTRAVENOUS at 22:50

## 2023-10-11 RX ADMIN — POTASSIUM CHLORIDE 10 MEQ: 7.46 INJECTION, SOLUTION INTRAVENOUS at 20:47

## 2023-10-11 RX ADMIN — POTASSIUM CHLORIDE 10 MEQ: 7.46 INJECTION, SOLUTION INTRAVENOUS at 14:01

## 2023-10-11 RX ADMIN — POTASSIUM BICARBONATE 40 MEQ: 782 TABLET, EFFERVESCENT ORAL at 13:58

## 2023-10-11 RX ADMIN — IPRATROPIUM BROMIDE 0.5 MG: 0.5 SOLUTION RESPIRATORY (INHALATION) at 20:31

## 2023-10-11 RX ADMIN — DOCUSATE SODIUM 100 MG: 100 CAPSULE, LIQUID FILLED ORAL at 20:38

## 2023-10-11 RX ADMIN — BUDESONIDE 250 MCG: 0.25 INHALANT RESPIRATORY (INHALATION) at 20:31

## 2023-10-11 RX ADMIN — SODIUM CHLORIDE, PRESERVATIVE FREE 10 ML: 5 INJECTION INTRAVENOUS at 20:38

## 2023-10-11 ASSESSMENT — PAIN DESCRIPTION - DESCRIPTORS: DESCRIPTORS: ACHING;DISCOMFORT;SORE

## 2023-10-11 ASSESSMENT — PAIN - FUNCTIONAL ASSESSMENT
PAIN_FUNCTIONAL_ASSESSMENT: NONE - DENIES PAIN
PAIN_FUNCTIONAL_ASSESSMENT: PREVENTS OR INTERFERES SOME ACTIVE ACTIVITIES AND ADLS

## 2023-10-11 ASSESSMENT — PAIN DESCRIPTION - ORIENTATION: ORIENTATION: MID;LOWER

## 2023-10-11 ASSESSMENT — PAIN SCALES - GENERAL: PAINLEVEL_OUTOF10: 6

## 2023-10-11 ASSESSMENT — PAIN DESCRIPTION - LOCATION: LOCATION: BACK

## 2023-10-11 NOTE — H&P
Hospital Medicine History & Physical      PCP: Kathryn Alcantara MD    Date of Admission: 10/11/2023    Date of Service: Pt seen/examined on 10/11/2023 and is     admitted to Inpatient with expected LOS greater than two midnights due to medical therapy. Chief Complaint:  had concerns including Abnormal Lab (K+ 1.8 per facility, denies cp, pt has no complaints ). History Of Present Illness:    Mr. Lynda Briceño, a 74y.o. 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 Pt was transferred from [address] [place] [address] to Transfer clinic in August 2023 for hematomyelia. Pt underwent  MR angiography and subsequent laminectomy on 8/10 with MRI 8/15 showing expected post-operative changes. Pt was discharrged to [facility] rehab on 8/18/2023. Pt was then discharged from rehab to SNF on 10/6/2023. Reviewed Discharge summary on 10/6/2023 (Pt was discharged on Lasix 80mg PO tid and Kcl 20mEq oral daily). He was seen by MD at SNF today and routine lab showed potassium 1.8, pt was then sent to ED for further evaluation and management of hypokalemia. Pt was seen on room air, well oriented, he is still in the process of PT/OT and trying to get his strength and balance better. His leg swelling has improved a lot since he has been on lasix 80mg tid. He denies fever, chills, cough, chest pain, N/V/abdominal pain. History (From Rehab H&P)  Patient was initially admitted at [address] [place] [address] on 08/18/2023 for an intensive inpatient program secondary to incomplete paraplegia with nontraumatic spinal cord injury status post lumbar spine surgery on August 10/23.     Patient has developed left lower extremity DVT and due to inability to anticoagulate secondary to hematomyelia patient has Hypokalemia  Active Problems:    History of DVT (deep vein thrombosis)    CKD (chronic kidney disease) stage 4, GFR 15-29 ml/min (HCC)    Benign essential hypertension    Mixed hyperlipidemia    Class 3 severe obesity with serious comorbidity and body mass index (BMI) of 40.0 to 44.9 in adult [facility])    Atrial fibrillation with controlled ventricular response (HCC)    Gout    Unable to ambulate    History of lower GI bleeding    Bilateral foot-drop 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 *    - hypokalemia likely secondary to diuresis, has improved: 1.8 -- 2.0 -- 2.5, will continue to replete and monitor  - resumed lasix at lower dose: 40mg PO bid,   - continue PT/OT while admitted,   - pt has hx of DVT, a fib, but not candidate for anticoagulation due to hx of lower GI bleed. - resumed TOPROL XL 25mg bid. - allopurinol 100mg daily for hx of gout, renal function stable at [facility]. DVT Prophylaxis: []Lovenox []Heparin []PCD [] Warfarin/NOAC [x]Encouraged ambulation    Diet: ADULT DIET; Regular; 3 carb choices (45 gm/meal)  Code Status: Full Code  Surrogate decision maker confirmed with patient:   Extended Emergency Contact Information  Primary Emergency Contact: [address] Phone: [phone]  Mobile Phone: [phone]  Relation: Spouse  Preferred language: English   needed? No  Secondary Emergency Contact: [address] [place] [address] Phone: [phone]  Relation: Brother/Sister    Admit status: [] Observation [x] Inpatient   Disposition: [x]Med/Surg [] Intermediate [] ICU/CCU    +++++++++++++++++++++++++++++++++++++++++++++++++  Rasheed Callaway MD PhD  Nell Short, [place]  +++++++++++++++++++++++++++++++++++++++++++++++++  NOTE: Report could be transcribed using voice recognition software.  Every effort was made to ensure accuracy; however, inadvertent computerized transcription errors may be present.

## 2023-10-12 LAB
ANION GAP SERPL CALCULATED.3IONS-SCNC: 14 MMOL/L (ref 7–16)
BASOPHILS # BLD: 0.13 K/UL (ref 0–0.2)
BASOPHILS NFR BLD: 1 % (ref 0–2)
BUN SERPL-MCNC: 65 MG/DL (ref 6–23)
CALCIUM SERPL-MCNC: 9.3 MG/DL (ref 8.6–10.2)
CHLORIDE SERPL-SCNC: 86 MMOL/L (ref 98–107)
CO2 SERPL-SCNC: 37 MMOL/L (ref 22–29)
CREAT SERPL-MCNC: 2 MG/DL (ref 0.7–1.2)
EOSINOPHIL # BLD: 0.48 K/UL (ref 0.05–0.5)
EOSINOPHILS RELATIVE PERCENT: 5 % (ref 0–6)
ERYTHROCYTE [DISTWIDTH] IN BLOOD BY AUTOMATED COUNT: 16.1 % (ref 11.5–15)
GFR SERPL CREATININE-BSD FRML MDRD: 37 ML/MIN/1.73M2
GLUCOSE SERPL-MCNC: 105 MG/DL (ref 74–99)
HCT VFR BLD AUTO: 37.4 % (ref 37–54)
HGB BLD-MCNC: 11.8 G/DL (ref 12.5–16.5)
IMM GRANULOCYTES # BLD AUTO: 0.04 K/UL (ref 0–0.58)
IMM GRANULOCYTES NFR BLD: 0 % (ref 0–5)
LYMPHOCYTES NFR BLD: 1.4 K/UL (ref 1.5–4)
LYMPHOCYTES RELATIVE PERCENT: 14 % (ref 20–42)
MAGNESIUM SERPL-MCNC: 2.3 MG/DL (ref 1.6–2.6)
MCH RBC QN AUTO: 28.2 PG (ref 26–35)
MCHC RBC AUTO-ENTMCNC: 31.6 G/DL (ref 32–34.5)
MCV RBC AUTO: 89.5 FL (ref 80–99.9)
MONOCYTES NFR BLD: 1.07 K/UL (ref 0.1–0.95)
MONOCYTES NFR BLD: 11 % (ref 2–12)
NEUTROPHILS NFR BLD: 69 % (ref 43–80)
NEUTS SEG NFR BLD: 6.86 K/UL (ref 1.8–7.3)
PLATELET # BLD AUTO: 267 K/UL (ref 130–450)
PMV BLD AUTO: 12.3 FL (ref 7–12)
POTASSIUM SERPL-SCNC: 2.3 MMOL/L (ref 3.5–5)
POTASSIUM SERPL-SCNC: 2.9 MMOL/L (ref 3.5–5)
RBC # BLD AUTO: 4.18 M/UL (ref 3.8–5.8)
SODIUM SERPL-SCNC: 137 MMOL/L (ref 132–146)
WBC OTHER # BLD: 10 K/UL (ref 4.5–11.5)

## 2023-10-12 PROCEDURE — 80048 BASIC METABOLIC PNL TOTAL CA: CPT

## 2023-10-12 PROCEDURE — 2580000003 HC RX 258: Performed by: HOSPITALIST

## 2023-10-12 PROCEDURE — 84132 ASSAY OF SERUM POTASSIUM: CPT

## 2023-10-12 PROCEDURE — 94640 AIRWAY INHALATION TREATMENT: CPT

## 2023-10-12 PROCEDURE — 85025 COMPLETE CBC W/AUTO DIFF WBC: CPT

## 2023-10-12 PROCEDURE — 2140000000 HC CCU INTERMEDIATE R&B

## 2023-10-12 PROCEDURE — 6360000002 HC RX W HCPCS: Performed by: HOSPITALIST

## 2023-10-12 PROCEDURE — 83735 ASSAY OF MAGNESIUM: CPT

## 2023-10-12 PROCEDURE — 6370000000 HC RX 637 (ALT 250 FOR IP): Performed by: STUDENT IN AN ORGANIZED HEALTH CARE EDUCATION/TRAINING PROGRAM

## 2023-10-12 PROCEDURE — 6370000000 HC RX 637 (ALT 250 FOR IP): Performed by: HOSPITALIST

## 2023-10-12 PROCEDURE — 36415 COLL VENOUS BLD VENIPUNCTURE: CPT

## 2023-10-12 RX ORDER — POTASSIUM CHLORIDE 20 MEQ/1
40 TABLET, EXTENDED RELEASE ORAL ONCE
Status: COMPLETED | OUTPATIENT
Start: 2023-10-12 | End: 2023-10-12

## 2023-10-12 RX ORDER — OXYCODONE HYDROCHLORIDE 5 MG/1
5 TABLET ORAL EVERY 4 HOURS PRN
Status: DISCONTINUED | OUTPATIENT
Start: 2023-10-12 | End: 2023-10-16 | Stop reason: HOSPADM

## 2023-10-12 RX ORDER — LORAZEPAM 0.5 MG/1
0.5 TABLET ORAL EVERY 8 HOURS PRN
Status: DISCONTINUED | OUTPATIENT
Start: 2023-10-12 | End: 2023-10-16 | Stop reason: HOSPADM

## 2023-10-12 RX ORDER — HEPARIN SODIUM 10000 [USP'U]/ML
5000 INJECTION, SOLUTION INTRAVENOUS; SUBCUTANEOUS EVERY 8 HOURS
Status: DISCONTINUED | OUTPATIENT
Start: 2023-10-12 | End: 2023-10-16 | Stop reason: HOSPADM

## 2023-10-12 RX ADMIN — POTASSIUM BICARBONATE 50 MEQ: 782 TABLET, EFFERVESCENT ORAL at 16:41

## 2023-10-12 RX ADMIN — POTASSIUM CHLORIDE 10 MEQ: 7.46 INJECTION, SOLUTION INTRAVENOUS at 08:48

## 2023-10-12 RX ADMIN — POTASSIUM BICARBONATE 50 MEQ: 782 TABLET, EFFERVESCENT ORAL at 21:12

## 2023-10-12 RX ADMIN — ALLOPURINOL 100 MG: 100 TABLET ORAL at 08:42

## 2023-10-12 RX ADMIN — POTASSIUM CHLORIDE 10 MEQ: 7.46 INJECTION, SOLUTION INTRAVENOUS at 11:08

## 2023-10-12 RX ADMIN — BUDESONIDE 250 MCG: 0.25 INHALANT RESPIRATORY (INHALATION) at 08:09

## 2023-10-12 RX ADMIN — DICYCLOMINE HYDROCHLORIDE 40 MG: 10 CAPSULE ORAL at 08:42

## 2023-10-12 RX ADMIN — FUROSEMIDE 40 MG: 40 TABLET ORAL at 08:41

## 2023-10-12 RX ADMIN — METOPROLOL SUCCINATE 25 MG: 25 TABLET, EXTENDED RELEASE ORAL at 21:13

## 2023-10-12 RX ADMIN — IPRATROPIUM BROMIDE 0.5 MG: 0.5 SOLUTION RESPIRATORY (INHALATION) at 08:09

## 2023-10-12 RX ADMIN — DICYCLOMINE HYDROCHLORIDE 40 MG: 10 CAPSULE ORAL at 13:43

## 2023-10-12 RX ADMIN — DOCUSATE SODIUM 100 MG: 100 CAPSULE, LIQUID FILLED ORAL at 08:42

## 2023-10-12 RX ADMIN — POTASSIUM CHLORIDE 10 MEQ: 7.46 INJECTION, SOLUTION INTRAVENOUS at 00:32

## 2023-10-12 RX ADMIN — POTASSIUM BICARBONATE 40 MEQ: 782 TABLET, EFFERVESCENT ORAL at 13:43

## 2023-10-12 RX ADMIN — ARFORMOTEROL TARTRATE 15 MCG: 15 SOLUTION RESPIRATORY (INHALATION) at 20:50

## 2023-10-12 RX ADMIN — GABAPENTIN 300 MG: 300 CAPSULE ORAL at 08:42

## 2023-10-12 RX ADMIN — GABAPENTIN 300 MG: 300 CAPSULE ORAL at 21:13

## 2023-10-12 RX ADMIN — SODIUM CHLORIDE, PRESERVATIVE FREE 10 ML: 5 INJECTION INTRAVENOUS at 21:15

## 2023-10-12 RX ADMIN — POTASSIUM CHLORIDE 10 MEQ: 7.46 INJECTION, SOLUTION INTRAVENOUS at 02:12

## 2023-10-12 RX ADMIN — IPRATROPIUM BROMIDE 0.5 MG: 0.5 SOLUTION RESPIRATORY (INHALATION) at 20:50

## 2023-10-12 RX ADMIN — BUDESONIDE 250 MCG: 0.25 INHALANT RESPIRATORY (INHALATION) at 20:50

## 2023-10-12 RX ADMIN — POTASSIUM CHLORIDE 10 MEQ: 7.46 INJECTION, SOLUTION INTRAVENOUS at 03:31

## 2023-10-12 RX ADMIN — POTASSIUM CHLORIDE 40 MEQ: 1500 TABLET, EXTENDED RELEASE ORAL at 08:41

## 2023-10-12 RX ADMIN — TOPIRAMATE 25 MG: 25 TABLET, FILM COATED ORAL at 08:42

## 2023-10-12 RX ADMIN — METOPROLOL SUCCINATE 25 MG: 25 TABLET, EXTENDED RELEASE ORAL at 08:42

## 2023-10-12 RX ADMIN — LORAZEPAM 0.5 MG: 0.5 TABLET ORAL at 21:13

## 2023-10-12 RX ADMIN — DICYCLOMINE HYDROCHLORIDE 40 MG: 10 CAPSULE ORAL at 21:13

## 2023-10-12 RX ADMIN — IPRATROPIUM BROMIDE 0.5 MG: 0.5 SOLUTION RESPIRATORY (INHALATION) at 16:48

## 2023-10-12 RX ADMIN — GABAPENTIN 300 MG: 300 CAPSULE ORAL at 13:43

## 2023-10-12 RX ADMIN — SODIUM CHLORIDE, PRESERVATIVE FREE 10 ML: 5 INJECTION INTRAVENOUS at 08:42

## 2023-10-12 RX ADMIN — ARFORMOTEROL TARTRATE 15 MCG: 15 SOLUTION RESPIRATORY (INHALATION) at 08:09

## 2023-10-12 RX ADMIN — OXYCODONE HYDROCHLORIDE 5 MG: 5 TABLET ORAL at 18:41

## 2023-10-12 ASSESSMENT — PAIN SCALES - GENERAL
PAINLEVEL_OUTOF10: 5
PAINLEVEL_OUTOF10: 7
PAINLEVEL_OUTOF10: 7
PAINLEVEL_OUTOF10: 0
PAINLEVEL_OUTOF10: 8
PAINLEVEL_OUTOF10: 0

## 2023-10-12 ASSESSMENT — PAIN DESCRIPTION - ORIENTATION
ORIENTATION: LEFT
ORIENTATION: MID

## 2023-10-12 ASSESSMENT — PAIN DESCRIPTION - LOCATION
LOCATION: BUTTOCKS
LOCATION: BACK

## 2023-10-12 ASSESSMENT — PAIN - FUNCTIONAL ASSESSMENT
PAIN_FUNCTIONAL_ASSESSMENT: ACTIVITIES ARE NOT PREVENTED
PAIN_FUNCTIONAL_ASSESSMENT: PREVENTS OR INTERFERES SOME ACTIVE ACTIVITIES AND ADLS

## 2023-10-12 ASSESSMENT — PAIN DESCRIPTION - DESCRIPTORS
DESCRIPTORS: ACHING;DISCOMFORT;DULL
DESCRIPTORS: ACHING;DISCOMFORT;SORE

## 2023-10-12 NOTE — PROGRESS NOTES
4 Eyes Skin Assessment     NAME:  Marko Barone  YOB: 1955  MEDICAL RECORD NUMBER:  [id number]    The patient is being assessed for  Admission    I agree that at least one RN has performed a thorough Head to Toe Skin Assessment on the patient. ALL assessment sites listed below have been assessed. Areas assessed by both nurses:    Head, Face, Ears, Shoulders, Back, Chest, Arms, Elbows, Hands, Sacrum. Buttock, Coccyx, Ischium, Legs. Feet and Heels, and Under Medical Devices         Does the Patient have a Wound? Yes wound(s) were present on assessment.  LDA wound assessment was Initiated and completed by RN       Tristian Prevention initiated by RN: Yes  Wound Care Orders initiated by RN: No    Pressure Injury (Stage 3,4, Unstageable, DTI, NWPT, and Complex wounds) if present, place Wound referral order by RN under : No    New Ostomies, if present place, Ostomy referral order under : No     Nurse 1 eSignature: Electronically signed by Wellington Valenzuela RN on 10/12/23 at 2:24 AM EDT    **SHARE this note so that the co-signing nurse can place an eSignature**    Nurse 2 eSignature: Electronically signed by Melisa Tong RN on 10/12/23 at 2:26 AM EDT

## 2023-10-12 NOTE — ACP (ADVANCE CARE PLANNING)
Advance Care Planning   Healthcare Decision Maker:    Primary Decision Maker: Court Wendy - Spouse - [phone]    Secondary Decision Maker: Yan Pollock - Brother/Sister - [phone]    Click here to complete Healthcare Decision Makers including selection of the Healthcare Decision Maker Relationship (ie \"Primary\").

## 2023-10-12 NOTE — PROGRESS NOTES
notified of critical lab values K 2.5 and CO2 42. Okay with giving 10 mEq potassium chloride x 6 doses via IV.

## 2023-10-12 NOTE — CARE COORDINATION
Transition of care: Admitted with hypokalemia. K 2.3 and other labs noted. Met with pt in room earlier this afternoon. Pt is from [facility]. Left vm with Ruslan Gutiérrez with [facility]. Pt's wife asked to meet with me in room. Per pt's wife, Judy Francois, she would like her  to go to #1 [facility] [place]  #[facility]. If those facilities are unable to accommodate pt then she is ok with another SO facility. Referral was made to [facility] with SO. Sw/cm will follow.

## 2023-10-12 NOTE — PROGRESS NOTES
Patient arrived to unit from ED with following belongings: shirt, pants, hat, cell phone. Oriented to room.

## 2023-10-12 NOTE — PATIENT CARE CONFERENCE
P Quality Flow/Interdisciplinary Rounds Progress Note        Quality Flow Rounds held on October 12, 2023    Disciplines Attending:  Bedside Nurse, , , and Nursing Unit Leadership    Haroldo Crump was admitted on 10/11/2023 10:54 AM    Anticipated Discharge Date:       Disposition:    Tristian Score:  Tristian Scale Score: 13    Readmission Risk              Risk of Unplanned Readmission:  24           Discussed patient goal for the day, patient clinical progression, and barriers to discharge.   The following Goal(s) of the Day/Commitment(s) have been identified:  Report labs/diagnostics      Mayra Harper RN  October 12, 2023

## 2023-10-12 NOTE — PROGRESS NOTES
Received phone call from patient's wife stating she is very upset he is not on his lexapro. Spoke with the patient regarding this and patient stated that he no longer takes Lexapro. Wife also was upset he wasn't on the pain medication or ativan he takes at home. I notified her that patient has not asked me for a pain pill all day. Wife yelled into the phone \"He won't tell you he is in pain he will only tell me. \" I educated patient's wife that unless patient makes his needs known to nursing staff we are unaware of this. I asked patient if he would like pain pill and he stated yes and that he would like something for anxiety. I educated patient on please letting us know if he needs something so we can address his needs. Messaged Dr. Pauline Carter for pain medication and notified her patient stated he takes ativan and not xanax. She placed appropriate orders.

## 2023-10-13 LAB
ANION GAP SERPL CALCULATED.3IONS-SCNC: 11 MMOL/L (ref 7–16)
BASOPHILS # BLD: 0.14 K/UL (ref 0–0.2)
BASOPHILS NFR BLD: 2 % (ref 0–2)
BUN SERPL-MCNC: 60 MG/DL (ref 6–23)
CALCIUM SERPL-MCNC: 9.9 MG/DL (ref 8.6–10.2)
CHLORIDE SERPL-SCNC: 89 MMOL/L (ref 98–107)
CO2 SERPL-SCNC: 37 MMOL/L (ref 22–29)
CREAT SERPL-MCNC: 1.8 MG/DL (ref 0.7–1.2)
EOSINOPHIL # BLD: 0.45 K/UL (ref 0.05–0.5)
EOSINOPHILS RELATIVE PERCENT: 5 % (ref 0–6)
ERYTHROCYTE [DISTWIDTH] IN BLOOD BY AUTOMATED COUNT: 15.9 % (ref 11.5–15)
GFR SERPL CREATININE-BSD FRML MDRD: 42 ML/MIN/1.73M2
GLUCOSE SERPL-MCNC: 130 MG/DL (ref 74–99)
HCT VFR BLD AUTO: 39.4 % (ref 37–54)
HGB BLD-MCNC: 12 G/DL (ref 12.5–16.5)
IMM GRANULOCYTES # BLD AUTO: 0.03 K/UL (ref 0–0.58)
IMM GRANULOCYTES NFR BLD: 0 % (ref 0–5)
LYMPHOCYTES NFR BLD: 1.26 K/UL (ref 1.5–4)
LYMPHOCYTES RELATIVE PERCENT: 15 % (ref 20–42)
MCH RBC QN AUTO: 27.8 PG (ref 26–35)
MCHC RBC AUTO-ENTMCNC: 30.5 G/DL (ref 32–34.5)
MCV RBC AUTO: 91.2 FL (ref 80–99.9)
MONOCYTES NFR BLD: 0.77 K/UL (ref 0.1–0.95)
MONOCYTES NFR BLD: 9 % (ref 2–12)
NEUTROPHILS NFR BLD: 69 % (ref 43–80)
NEUTS SEG NFR BLD: 5.81 K/UL (ref 1.8–7.3)
PLATELET # BLD AUTO: 247 K/UL (ref 130–450)
PMV BLD AUTO: 12.6 FL (ref 7–12)
POTASSIUM SERPL-SCNC: 3 MMOL/L (ref 3.5–5)
RBC # BLD AUTO: 4.32 M/UL (ref 3.8–5.8)
SODIUM SERPL-SCNC: 137 MMOL/L (ref 132–146)
WBC OTHER # BLD: 8.5 K/UL (ref 4.5–11.5)

## 2023-10-13 PROCEDURE — 6370000000 HC RX 637 (ALT 250 FOR IP): Performed by: STUDENT IN AN ORGANIZED HEALTH CARE EDUCATION/TRAINING PROGRAM

## 2023-10-13 PROCEDURE — 94640 AIRWAY INHALATION TREATMENT: CPT

## 2023-10-13 PROCEDURE — 97161 PT EVAL LOW COMPLEX 20 MIN: CPT

## 2023-10-13 PROCEDURE — 6360000002 HC RX W HCPCS: Performed by: HOSPITALIST

## 2023-10-13 PROCEDURE — 97535 SELF CARE MNGMENT TRAINING: CPT

## 2023-10-13 PROCEDURE — 80048 BASIC METABOLIC PNL TOTAL CA: CPT

## 2023-10-13 PROCEDURE — 97530 THERAPEUTIC ACTIVITIES: CPT

## 2023-10-13 PROCEDURE — 97165 OT EVAL LOW COMPLEX 30 MIN: CPT

## 2023-10-13 PROCEDURE — 2580000003 HC RX 258: Performed by: HOSPITALIST

## 2023-10-13 PROCEDURE — 85025 COMPLETE CBC W/AUTO DIFF WBC: CPT

## 2023-10-13 PROCEDURE — 6370000000 HC RX 637 (ALT 250 FOR IP): Performed by: HOSPITALIST

## 2023-10-13 PROCEDURE — 2140000000 HC CCU INTERMEDIATE R&B

## 2023-10-13 PROCEDURE — 36415 COLL VENOUS BLD VENIPUNCTURE: CPT

## 2023-10-13 RX ORDER — ESCITALOPRAM OXALATE 10 MG/1
10 TABLET ORAL DAILY
Status: DISCONTINUED | OUTPATIENT
Start: 2023-10-13 | End: 2023-10-16 | Stop reason: HOSPADM

## 2023-10-13 RX ADMIN — GABAPENTIN 300 MG: 300 CAPSULE ORAL at 20:41

## 2023-10-13 RX ADMIN — Medication 2500 UNITS: at 11:26

## 2023-10-13 RX ADMIN — OXYCODONE HYDROCHLORIDE 5 MG: 5 TABLET ORAL at 22:46

## 2023-10-13 RX ADMIN — IPRATROPIUM BROMIDE 0.5 MG: 0.5 SOLUTION RESPIRATORY (INHALATION) at 20:48

## 2023-10-13 RX ADMIN — DOCUSATE SODIUM 100 MG: 100 CAPSULE, LIQUID FILLED ORAL at 09:14

## 2023-10-13 RX ADMIN — OXYCODONE HYDROCHLORIDE 5 MG: 5 TABLET ORAL at 09:18

## 2023-10-13 RX ADMIN — ARFORMOTEROL TARTRATE 15 MCG: 15 SOLUTION RESPIRATORY (INHALATION) at 08:44

## 2023-10-13 RX ADMIN — DICYCLOMINE HYDROCHLORIDE 40 MG: 10 CAPSULE ORAL at 13:20

## 2023-10-13 RX ADMIN — DICYCLOMINE HYDROCHLORIDE 40 MG: 10 CAPSULE ORAL at 09:13

## 2023-10-13 RX ADMIN — DICYCLOMINE HYDROCHLORIDE 40 MG: 10 CAPSULE ORAL at 20:41

## 2023-10-13 RX ADMIN — METOPROLOL SUCCINATE 25 MG: 25 TABLET, EXTENDED RELEASE ORAL at 09:12

## 2023-10-13 RX ADMIN — GABAPENTIN 300 MG: 300 CAPSULE ORAL at 09:13

## 2023-10-13 RX ADMIN — GABAPENTIN 300 MG: 300 CAPSULE ORAL at 13:21

## 2023-10-13 RX ADMIN — ESCITALOPRAM OXALATE 10 MG: 10 TABLET ORAL at 11:26

## 2023-10-13 RX ADMIN — IPRATROPIUM BROMIDE 0.5 MG: 0.5 SOLUTION RESPIRATORY (INHALATION) at 16:22

## 2023-10-13 RX ADMIN — ALLOPURINOL 100 MG: 100 TABLET ORAL at 09:13

## 2023-10-13 RX ADMIN — POTASSIUM BICARBONATE 40 MEQ: 782 TABLET, EFFERVESCENT ORAL at 13:18

## 2023-10-13 RX ADMIN — SODIUM CHLORIDE, PRESERVATIVE FREE 10 ML: 5 INJECTION INTRAVENOUS at 20:41

## 2023-10-13 RX ADMIN — BUDESONIDE 250 MCG: 0.25 INHALANT RESPIRATORY (INHALATION) at 08:44

## 2023-10-13 RX ADMIN — SODIUM CHLORIDE, PRESERVATIVE FREE 10 ML: 5 INJECTION INTRAVENOUS at 09:15

## 2023-10-13 RX ADMIN — IPRATROPIUM BROMIDE 0.5 MG: 0.5 SOLUTION RESPIRATORY (INHALATION) at 08:44

## 2023-10-13 RX ADMIN — LORAZEPAM 0.5 MG: 0.5 TABLET ORAL at 22:46

## 2023-10-13 RX ADMIN — METOPROLOL SUCCINATE 25 MG: 25 TABLET, EXTENDED RELEASE ORAL at 20:41

## 2023-10-13 RX ADMIN — TOPIRAMATE 25 MG: 25 TABLET, FILM COATED ORAL at 09:13

## 2023-10-13 ASSESSMENT — PAIN DESCRIPTION - ORIENTATION
ORIENTATION: MID
ORIENTATION: RIGHT

## 2023-10-13 ASSESSMENT — PAIN SCALES - GENERAL
PAINLEVEL_OUTOF10: 7
PAINLEVEL_OUTOF10: 8
PAINLEVEL_OUTOF10: 0

## 2023-10-13 ASSESSMENT — PAIN SCALES - WONG BAKER: WONGBAKER_NUMERICALRESPONSE: 0

## 2023-10-13 ASSESSMENT — PAIN DESCRIPTION - DESCRIPTORS
DESCRIPTORS: SORE;ACHING;DISCOMFORT
DESCRIPTORS: ACHING

## 2023-10-13 ASSESSMENT — PAIN DESCRIPTION - PAIN TYPE: TYPE: ACUTE PAIN;CHRONIC PAIN

## 2023-10-13 ASSESSMENT — PAIN DESCRIPTION - LOCATION
LOCATION: BUTTOCKS
LOCATION: BUTTOCKS

## 2023-10-13 ASSESSMENT — PAIN DESCRIPTION - FREQUENCY: FREQUENCY: INTERMITTENT

## 2023-10-13 ASSESSMENT — PAIN - FUNCTIONAL ASSESSMENT: PAIN_FUNCTIONAL_ASSESSMENT: ACTIVITIES ARE NOT PREVENTED

## 2023-10-13 ASSESSMENT — PAIN DESCRIPTION - ONSET: ONSET: ON-GOING

## 2023-10-13 NOTE — PLAN OF CARE
Problem: Chronic Conditions and Co-morbidities  Goal: Patient's chronic conditions and co-morbidity symptoms are monitored and maintained or improved  10/13/2023 0044 by Luis Young RN  Outcome: Progressing  10/12/2023 1553 by Jass Bagley RN  Outcome: Progressing     Problem: Discharge Planning  Goal: Discharge to home or other facility with appropriate resources  10/13/2023 0044 by Luis Young RN  Outcome: Progressing  10/12/2023 1553 by Jass Bagley RN  Outcome: Progressing     Problem: Pain  Goal: Verbalizes/displays adequate comfort level or baseline comfort level  10/13/2023 0044 by Luis Young RN  Outcome: Progressing  10/12/2023 1553 by Jass Bagley RN  Outcome: Progressing     Problem: Skin/Tissue Integrity  Goal: Absence of new skin breakdown  Description: 1. Monitor for areas of redness and/or skin breakdown  2. Assess vascular access sites hourly  3. Every 4-6 hours minimum:  Change oxygen saturation probe site  4. Every 4-6 hours:  If on nasal continuous positive airway pressure, respiratory therapy assess nares and determine need for appliance change or resting period.   Outcome: Progressing     Problem: ABCDS Injury Assessment  Goal: Absence of physical injury  Outcome: Progressing     Problem: Safety - Adult  Goal: Free from fall injury  Outcome: Progressing

## 2023-10-13 NOTE — PROGRESS NOTES
No lab results found this morning. Call placed to lab. Tech stated blood was drawn, but is in the system as \"pending. \"  Lab to call nurse back after finding results. Lab unable to find results. Orders re-entered. Lab to be drawn.

## 2023-10-13 NOTE — PROGRESS NOTES
Occupational Therapy  OCCUPATIONAL THERAPY INITIAL EVALUATION    [place] [address] [place], [place]      BALN:                                                Patient Name: Akila Burrows  MRN: [id number]  : 1955  Room: [address] [place] [address] #[id number]    Referring Provider: Arnold Garcia MD  Specific Provider Orders/Date: OT eval and treat 10/12/23     Diagnosis: Hypokalemia [E87.6]   Pt admitted to hospital on 10/11/23 for abnormal labs    Pertinent Medical History:  has a past medical history of A-V fistula ([address]), Arthritis, Back pain, CKD (chronic kidney disease), HTN (hypertension), Keratoconus, Kidney disease, Lymphadenopathy, Migraine, Prolonged emergence from general anesthesia, and SOB (shortness of breath).        Precautions:  Fall Risk, B heel protectors, bed alarm    Assessment of current deficits    [x] Functional mobility  [x]ADLs  [x] Strength               []Cognition    [x] Functional transfers   [x] IADLs         [x] Safety Awareness   [x]Endurance    [] Fine Coordination              [x] Balance      [] Vision/perception   []Sensation     []Gross Motor Coordination  [] ROM  [] Delirium                   [] Motor Control     OT PLAN OF CARE   OT POC based on physician orders, patient diagnosis and results of clinical assessment    Frequency/Duration 1-3 days/wk for 2 weeks PRN   Specific OT Treatment Interventions to include:   * Instruction/training on adapted ADL techniques and AE recommendations to increase functional independence within precautions       * Training on energy conservation strategies, correct breathing pattern and techniques to improve independence/tolerance for self-care routine  * Functional transfer/mobility training/DME recommendations for increased independence, safety, and fall prevention  * Patient/Family education to increase follow through with safety techniques Visual/  Perceptual Glasses: yes                  Hand Dominance R   AROM (PROM) Strength Additional Info:    RUE  WFL 4-/5 good  and wfl FMC/dexterity noted during ADL tasks       LUE WFL 4-/5 good  and wfl FMC/dexterity noted during ADL tasks       Hearing: WFL  Sensation:  No c/o numbness or tingling  Tone: WFL  Edema: B LE's    Comments: Upon arrival patient lying in bed. Therapist educated pt on role of OT. At end of session, patient semi-supine in bed (bed alarm on) with call light and phone within reach, all lines and tubes intact. Overall patient demonstrated decreased independence and safety during completion of ADL/functional transfer/mobility tasks. Pt would benefit from continued skilled OT to increase safety and independence with completion of ADL/IADL tasks for functional independence and quality of life. Treatment: OT treatment provided this date includes: Facilitation of bed mobility (education/cues for body mechanics), unsupported sitting balance (addressing posture, weight shifting, dynamic reaching to prep for ADL's), functional transfers (w/ education/cues for safety/hand placement), standing tolerance tasks (addressing posture, balance and activity tolerance while incorporating light functional reaching impacting ADLs and functional activity) and lateral sidesteps to [facility] with w/w (w/ education/cuing on posture, w/w management and safety. Increased time required). Therapist facilitated self-care retraining: UB/LB self-care tasks (gown, socks), toileting task and seated grooming tasks while educating/cuing pt on modified techniques, posture, safety and energy conservation techniques. Skilled monitoring of HR, O2 sats and pts response to treatment. Rehab Potential: Good for established goals     Patient / Family Goal: not stated      Patient and/or family were instructed on functional diagnosis, prognosis/goals and OT plan of care. Demonstrated fair understanding.      Roselyn

## 2023-10-13 NOTE — CARE COORDINATION
Transition of care update: K 3.0 and other labs noted. Chart reviewed. Pt was accepted at [facility] They have started pre cert. Ambulance form and DC envelope was completed. Pt was placed on weekend therapy list. Kojo Flores with [facility] was updated pt going  to [facility] [place]. Kojo Flores is faxing EXEMPT to [employer]. I provided her with fax number. Ora/cm will follow. [phone]  Ambulance form was started. Will need dated and signed on day of dc. DC envelope was placed with pt's soft chart.

## 2023-10-13 NOTE — PROGRESS NOTES
P Quality Flow/Interdisciplinary Rounds Progress Note        Quality Flow Rounds held on October 13, 2023    Disciplines Attending:  Bedside Nurse, , , and Nursing Unit Leadership    Nirmal Smith was admitted on 10/11/2023 10:54 AM    Anticipated Discharge Date:       Disposition:    Tristian Score:  Tristian Scale Score: 14    Readmission Risk              Risk of Unplanned Readmission:  26           Discussed patient goal for the day, patient clinical progression, and barriers to discharge.   The following Goal(s) of the Day/Commitment(s) have been identified:  Labs - Report Results      Dwight Jerome RN  October 13, 2023

## 2023-10-13 NOTE — DISCHARGE INSTR - COC
Continuity of Care Form    Patient Name: Ermalene Shone   :  1955  MRN:  [id number]    Admit date:  10/11/2023  Discharge date:  10/16/23  Code Status Order: Full Code   Advance Directives:     Admitting Physician: Hector Herrera MD  PCP: Niurka Dotson MD    Discharging Nurse: [facility] Unit/Room#: 9310/0452-G  Discharging Unit Phone Number: [phone]    Emergency Contact:   Extended Emergency Contact Information  Primary Emergency Contact: [address] Phone: [phone]  Mobile Phone: [phone]  Relation: Spouse  Preferred language: English   needed?  No  Secondary Emergency Contact: [address] [facility] [address] [facility] [facility] Phone: [phone]  Relation: Brother/Sister    Past Surgical History:  Past Surgical History:   Procedure Laterality Date    COLONOSCOPY      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    DIALYSIS FISTULA CREATION Left 2021    CREATION AV FISTULA LEFT ARM performed by Ollen Simmonds, MD at [facility] Left 2021    REVISION ARTERIOVENOUS FISTULA LEFT ARM performed by Ollen Simmonds, MD at [address] [place] [address]  2002    lap tricia    UPPER GASTROINTESTINAL ENDOSCOPY N/A 2019    EGD FOREIGN BODY REMOVAL performed by Yuri Aguillon MD at [address] [place] [address],[address] Right 3/27/2023    VENA CAVA FILTER INSERTION performed by Royer Oliveira MD at [address] [place] [address]       Immunization History:   Immunization History   Administered Date(s) Administered    Influenza Virus Vaccine 10/12/2016, 10/16/2018, 2021    Pneumococcal, PPSV23, PNEUMOVAX 23, (age 2y+), SC/IM, 0.5mL 2015, 2016       Active Problems:  Patient Active Problem List   Diagnosis Code    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

## 2023-10-13 NOTE — PLAN OF CARE
Problem: Chronic Conditions and Co-morbidities  Goal: Patient's chronic conditions and co-morbidity symptoms are monitored and maintained or improved  10/13/2023 1038 by Lilian Forrest RN  Outcome: Progressing  10/13/2023 0044 by Kailey Rogers RN  Outcome: Progressing     Problem: Discharge Planning  Goal: Discharge to home or other facility with appropriate resources  10/13/2023 1038 by Lilian Forrest RN  Outcome: Progressing  Flowsheets (Taken 10/13/2023 0800)  Discharge to home or other facility with appropriate resources: Identify barriers to discharge with patient and caregiver  10/13/2023 0044 by Kailey Rogers RN  Outcome: Progressing     Problem: Pain  Goal: Verbalizes/displays adequate comfort level or baseline comfort level  10/13/2023 1038 by Lilian Forrest RN  Outcome: Progressing  10/13/2023 0044 by Kailey Rogers RN  Outcome: Progressing     Problem: Skin/Tissue Integrity  Goal: Absence of new skin breakdown  Description: 1. Monitor for areas of redness and/or skin breakdown  2. Assess vascular access sites hourly  3. Every 4-6 hours minimum:  Change oxygen saturation probe site  4. Every 4-6 hours:  If on nasal continuous positive airway pressure, respiratory therapy assess nares and determine need for appliance change or resting period.   10/13/2023 1038 by Lilian Forrest RN  Outcome: Progressing  10/13/2023 0044 by Kailey Rogers RN  Outcome: Progressing     Problem: ABCDS Injury Assessment  Goal: Absence of physical injury  10/13/2023 1038 by Lilian Forrest RN  Outcome: Progressing  10/13/2023 0044 by Kailey Rogers RN  Outcome: Progressing     Problem: Safety - Adult  Goal: Free from fall injury  Recent Flowsheet Documentation  Taken 10/13/2023 1036 by Lilian Forrest RN  Free From Fall Injury: Instruct family/caregiver on patient safety  10/13/2023 0044 by Kailey Rogers RN  Outcome: Progressing

## 2023-10-13 NOTE — PROGRESS NOTES
Physical Therapy  Physical Therapy Initial Assessment     Name: Eliu Ledbetter  : 1955  MRN: [id number]      Date of Service: 10/13/2023    Evaluating PT:  Miguel A Peterson, PT, DPT, [id number]    Room #:  9322/1247-E  Diagnosis:  Hypokalemia [E87.6]  PMHx/PSHx:    Past Medical History:   Diagnosis Date    A-V fistula ([address])     left arm    Arthritis     Back pain 2019    had LESI    CKD (chronic kidney disease)     stage 4    HTN (hypertension)     Keratoconus     bilateral    Kidney disease     Lymphadenopathy     lower legs    Migraine     Prolonged emergence from general anesthesia     SOB (shortness of breath)      Past Surgical History:   Procedure Laterality Date    COLONOSCOPY      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    DIALYSIS FISTULA CREATION Left 2021    CREATION AV FISTULA LEFT ARM performed by Miesha Ang MD at [facility] Left 2021    REVISION ARTERIOVENOUS FISTULA LEFT ARM performed by Miesha Ang MD at [address] [place] [address]  2002    lap tricia    UPPER GASTROINTESTINAL ENDOSCOPY N/A 2019    EGD FOREIGN BODY REMOVAL performed by Alisia Madden MD at [address] [place] [address],[address] Right 3/27/2023    VENA CAVA FILTER INSERTION performed by Abbi Fierro MD at [address]      Procedure/Surgery:  none this admission  Precautions:  Fall Risk, R DF weakness + Bed Alarm  Equipment Needs:  TBD    SUBJECTIVE:    Pt was admitted from [address] [place] [address]. Pt used w/c for mobility PTA. Transferred <> w/c with slide board. Pt reported working on standing in parallel bars with PT PTA. OBJECTIVE:   Initial Evaluation  Date: 10/13/23 Treatment Short Term/ Long Term   Goals   AM-PAC 6 Clicks 49/20     Was pt agreeable to Eval/treatment? yes     Does pt have pain?  8/10 pain in \"butt\" and 5-6/10 R knee/thigh pain     Bed Mobility  Rolling: Charli  Supine to sit: SBA  Sit to supine: ModA  Scooting: SBA  Rolling: Independent   Supine to sit: Independent   Sit to supine: Independent   Scooting: Independent    Transfers Sit to stand: ModA (x2 reps)  Stand to sit: ModA  Stand pivot: NT - pt declined  Sit to stand: SBA  Stand to sit: SBA  Stand pivot: SBA with AD   Ambulation    ~3 side steps R/L with [employer] ModA   >20 feet with AD SBA   Stair negotiation: ascended and descended  NT  TBD   ROM BUE:  Refer to OT note  BLE:  WFL     Strength BUE:  Refer to OT note  BLE:  4/5 except R DF 3+/5  Improve by 1/3 MMT   Balance Sitting EOB:  SBA  Dynamic Standing:  ModA with [employer]  Sitting EOB:  Independent   Dynamic Standing:  SBA with AD     Pt is A & O x 4; delayed processing noted throughout session. Pt is self-limiting; Reported difficulty finding words  Sensation:  Pt reports numbness and tingling to RLE  Edema:  BUE/LE    Patient education  Pt educated on role of PT and safety with functional mobility     Patient response to education:   Pt verbalized understanding Pt demonstrated skill Pt requires further education in this area   x x x     ASSESSMENT:    Conditions Requiring Skilled Therapeutic Intervention:    [x]Decreased strength     []Decreased ROM  [x]Decreased functional mobility  [x]Decreased balance   [x]Decreased endurance   []Decreased posture  [x]Decreased sensation  []Decreased coordination   []Decreased vision  []Decreased safety awareness   [x]Increased pain       Comments:  Medically cleared for session by RN. Pt was in bed upon PT entry and agreeable to participate. Pt transferred to EOB assisting BLE with BUE. Once sitting EOB pt was able to maintain fair seated balance throughout evaluation. Completed sit<>stand transfer (x2 reps) to [employer] with lift assist and verbal cues for hand placement. Once standing pt required cues to push down through [employer] for stability. Seated rest given between reps. Side stepped R/L at EOB with assist for balance, [employer] management, and occasional assist to weight shift. Pt was self limiting throughout session.  Pt declined

## 2023-10-14 LAB
ANION GAP SERPL CALCULATED.3IONS-SCNC: 10 MMOL/L (ref 7–16)
BASOPHILS # BLD: 0.15 K/UL (ref 0–0.2)
BASOPHILS NFR BLD: 2 % (ref 0–2)
BUN SERPL-MCNC: 54 MG/DL (ref 6–23)
CALCIUM SERPL-MCNC: 10.1 MG/DL (ref 8.6–10.2)
CHLORIDE SERPL-SCNC: 85 MMOL/L (ref 98–107)
CO2 SERPL-SCNC: 42 MMOL/L (ref 22–29)
CREAT SERPL-MCNC: 1.7 MG/DL (ref 0.7–1.2)
EOSINOPHIL # BLD: 0.66 K/UL (ref 0.05–0.5)
EOSINOPHILS RELATIVE PERCENT: 7 % (ref 0–6)
ERYTHROCYTE [DISTWIDTH] IN BLOOD BY AUTOMATED COUNT: 15.9 % (ref 11.5–15)
GFR SERPL CREATININE-BSD FRML MDRD: 43 ML/MIN/1.73M2
GLUCOSE SERPL-MCNC: 97 MG/DL (ref 74–99)
HCT VFR BLD AUTO: 42.5 % (ref 37–54)
HGB BLD-MCNC: 13.2 G/DL (ref 12.5–16.5)
IMM GRANULOCYTES # BLD AUTO: 0.03 K/UL (ref 0–0.58)
IMM GRANULOCYTES NFR BLD: 0 % (ref 0–5)
LYMPHOCYTES NFR BLD: 1.43 K/UL (ref 1.5–4)
LYMPHOCYTES RELATIVE PERCENT: 15 % (ref 20–42)
MAGNESIUM SERPL-MCNC: 2.4 MG/DL (ref 1.6–2.6)
MCH RBC QN AUTO: 28.4 PG (ref 26–35)
MCHC RBC AUTO-ENTMCNC: 31.1 G/DL (ref 32–34.5)
MCV RBC AUTO: 91.4 FL (ref 80–99.9)
MONOCYTES NFR BLD: 0.72 K/UL (ref 0.1–0.95)
MONOCYTES NFR BLD: 8 % (ref 2–12)
NEUTROPHILS NFR BLD: 69 % (ref 43–80)
NEUTS SEG NFR BLD: 6.64 K/UL (ref 1.8–7.3)
PLATELET # BLD AUTO: 270 K/UL (ref 130–450)
PMV BLD AUTO: 11.8 FL (ref 7–12)
POTASSIUM SERPL-SCNC: 2.8 MMOL/L (ref 3.5–5)
RBC # BLD AUTO: 4.65 M/UL (ref 3.8–5.8)
SODIUM SERPL-SCNC: 137 MMOL/L (ref 132–146)
WBC OTHER # BLD: 9.6 K/UL (ref 4.5–11.5)

## 2023-10-14 PROCEDURE — 94640 AIRWAY INHALATION TREATMENT: CPT

## 2023-10-14 PROCEDURE — 83735 ASSAY OF MAGNESIUM: CPT

## 2023-10-14 PROCEDURE — 80048 BASIC METABOLIC PNL TOTAL CA: CPT

## 2023-10-14 PROCEDURE — 2140000000 HC CCU INTERMEDIATE R&B

## 2023-10-14 PROCEDURE — 6360000002 HC RX W HCPCS: Performed by: HOSPITALIST

## 2023-10-14 PROCEDURE — 2580000003 HC RX 258: Performed by: HOSPITALIST

## 2023-10-14 PROCEDURE — 6370000000 HC RX 637 (ALT 250 FOR IP): Performed by: STUDENT IN AN ORGANIZED HEALTH CARE EDUCATION/TRAINING PROGRAM

## 2023-10-14 PROCEDURE — 6370000000 HC RX 637 (ALT 250 FOR IP): Performed by: INTERNAL MEDICINE

## 2023-10-14 PROCEDURE — 85025 COMPLETE CBC W/AUTO DIFF WBC: CPT

## 2023-10-14 PROCEDURE — 6370000000 HC RX 637 (ALT 250 FOR IP): Performed by: HOSPITALIST

## 2023-10-14 RX ORDER — POTASSIUM CHLORIDE 20 MEQ/1
40 TABLET, EXTENDED RELEASE ORAL ONCE
Status: COMPLETED | OUTPATIENT
Start: 2023-10-14 | End: 2023-10-14

## 2023-10-14 RX ADMIN — LORAZEPAM 0.5 MG: 0.5 TABLET ORAL at 21:45

## 2023-10-14 RX ADMIN — SODIUM CHLORIDE, PRESERVATIVE FREE 10 ML: 5 INJECTION INTRAVENOUS at 20:16

## 2023-10-14 RX ADMIN — OXYCODONE HYDROCHLORIDE 5 MG: 5 TABLET ORAL at 23:06

## 2023-10-14 RX ADMIN — GABAPENTIN 300 MG: 300 CAPSULE ORAL at 20:16

## 2023-10-14 RX ADMIN — POTASSIUM CHLORIDE 10 MEQ: 7.46 INJECTION, SOLUTION INTRAVENOUS at 15:06

## 2023-10-14 RX ADMIN — DOCUSATE SODIUM 100 MG: 100 CAPSULE, LIQUID FILLED ORAL at 10:09

## 2023-10-14 RX ADMIN — GABAPENTIN 300 MG: 300 CAPSULE ORAL at 15:01

## 2023-10-14 RX ADMIN — ALLOPURINOL 100 MG: 100 TABLET ORAL at 10:09

## 2023-10-14 RX ADMIN — DICYCLOMINE HYDROCHLORIDE 40 MG: 10 CAPSULE ORAL at 20:16

## 2023-10-14 RX ADMIN — POTASSIUM CHLORIDE 10 MEQ: 7.46 INJECTION, SOLUTION INTRAVENOUS at 12:43

## 2023-10-14 RX ADMIN — POTASSIUM CHLORIDE 40 MEQ: 1500 TABLET, EXTENDED RELEASE ORAL at 10:11

## 2023-10-14 RX ADMIN — POTASSIUM CHLORIDE 10 MEQ: 7.46 INJECTION, SOLUTION INTRAVENOUS at 18:01

## 2023-10-14 RX ADMIN — POTASSIUM CHLORIDE 10 MEQ: 7.46 INJECTION, SOLUTION INTRAVENOUS at 11:31

## 2023-10-14 RX ADMIN — TOPIRAMATE 25 MG: 25 TABLET, FILM COATED ORAL at 10:09

## 2023-10-14 RX ADMIN — POTASSIUM CHLORIDE 10 MEQ: 7.46 INJECTION, SOLUTION INTRAVENOUS at 20:14

## 2023-10-14 RX ADMIN — IPRATROPIUM BROMIDE 0.5 MG: 0.5 SOLUTION RESPIRATORY (INHALATION) at 20:55

## 2023-10-14 RX ADMIN — ARFORMOTEROL TARTRATE 15 MCG: 15 SOLUTION RESPIRATORY (INHALATION) at 20:55

## 2023-10-14 RX ADMIN — DICYCLOMINE HYDROCHLORIDE 40 MG: 10 CAPSULE ORAL at 15:01

## 2023-10-14 RX ADMIN — DICYCLOMINE HYDROCHLORIDE 40 MG: 10 CAPSULE ORAL at 10:10

## 2023-10-14 RX ADMIN — IPRATROPIUM BROMIDE 0.5 MG: 0.5 SOLUTION RESPIRATORY (INHALATION) at 08:47

## 2023-10-14 RX ADMIN — POTASSIUM CHLORIDE 10 MEQ: 7.46 INJECTION, SOLUTION INTRAVENOUS at 21:41

## 2023-10-14 RX ADMIN — POTASSIUM CHLORIDE 30 MEQ: 1500 TABLET, EXTENDED RELEASE ORAL at 17:58

## 2023-10-14 RX ADMIN — IPRATROPIUM BROMIDE 0.5 MG: 0.5 SOLUTION RESPIRATORY (INHALATION) at 11:56

## 2023-10-14 RX ADMIN — METOPROLOL SUCCINATE 25 MG: 25 TABLET, EXTENDED RELEASE ORAL at 20:16

## 2023-10-14 RX ADMIN — BUDESONIDE 250 MCG: 0.25 INHALANT RESPIRATORY (INHALATION) at 08:47

## 2023-10-14 RX ADMIN — SODIUM CHLORIDE, PRESERVATIVE FREE 10 ML: 5 INJECTION INTRAVENOUS at 10:14

## 2023-10-14 RX ADMIN — ESCITALOPRAM OXALATE 10 MG: 10 TABLET ORAL at 10:09

## 2023-10-14 RX ADMIN — METOPROLOL SUCCINATE 25 MG: 25 TABLET, EXTENDED RELEASE ORAL at 10:09

## 2023-10-14 RX ADMIN — IPRATROPIUM BROMIDE 0.5 MG: 0.5 SOLUTION RESPIRATORY (INHALATION) at 16:11

## 2023-10-14 RX ADMIN — BUDESONIDE 250 MCG: 0.25 INHALANT RESPIRATORY (INHALATION) at 20:54

## 2023-10-14 RX ADMIN — OXYCODONE HYDROCHLORIDE 5 MG: 5 TABLET ORAL at 11:34

## 2023-10-14 RX ADMIN — POTASSIUM CHLORIDE 10 MEQ: 7.46 INJECTION, SOLUTION INTRAVENOUS at 23:05

## 2023-10-14 RX ADMIN — GABAPENTIN 300 MG: 300 CAPSULE ORAL at 10:11

## 2023-10-14 RX ADMIN — OXYCODONE HYDROCHLORIDE 5 MG: 5 TABLET ORAL at 18:05

## 2023-10-14 RX ADMIN — Medication 2500 UNITS: at 10:11

## 2023-10-14 RX ADMIN — ARFORMOTEROL TARTRATE 15 MCG: 15 SOLUTION RESPIRATORY (INHALATION) at 08:47

## 2023-10-14 ASSESSMENT — PAIN SCALES - GENERAL
PAINLEVEL_OUTOF10: 7
PAINLEVEL_OUTOF10: 0
PAINLEVEL_OUTOF10: 7
PAINLEVEL_OUTOF10: 0

## 2023-10-14 ASSESSMENT — PAIN DESCRIPTION - LOCATION
LOCATION: BACK
LOCATION: BACK

## 2023-10-14 ASSESSMENT — PAIN - FUNCTIONAL ASSESSMENT
PAIN_FUNCTIONAL_ASSESSMENT: ACTIVITIES ARE NOT PREVENTED
PAIN_FUNCTIONAL_ASSESSMENT: ACTIVITIES ARE NOT PREVENTED

## 2023-10-14 ASSESSMENT — PAIN DESCRIPTION - ORIENTATION
ORIENTATION: LEFT
ORIENTATION: MID;LOWER

## 2023-10-14 ASSESSMENT — PAIN DESCRIPTION - DESCRIPTORS
DESCRIPTORS: ACHING;CRAMPING;DISCOMFORT
DESCRIPTORS: ACHING;DISCOMFORT;SORE

## 2023-10-14 NOTE — PROGRESS NOTES
Failed attempt to draw patient's blood via lab this morning. RN able to draw blood and sent to lab for BMP and CBC.

## 2023-10-14 NOTE — PLAN OF CARE
Problem: Chronic Conditions and Co-morbidities  Goal: Patient's chronic conditions and co-morbidity symptoms are monitored and maintained or improved  10/13/2023 2332 by Jodi Rojas RN  Outcome: Progressing  10/13/2023 1038 by Joy Herman RN  Outcome: Progressing     Problem: Discharge Planning  Goal: Discharge to home or other facility with appropriate resources  10/13/2023 2332 by Jodi Rojas RN  Outcome: Progressing  10/13/2023 1038 by Joy Herman RN  Outcome: Progressing  Flowsheets (Taken 10/13/2023 0800)  Discharge to home or other facility with appropriate resources: Identify barriers to discharge with patient and caregiver     Problem: Pain  Goal: Verbalizes/displays adequate comfort level or baseline comfort level  10/13/2023 2332 by Jodi Rojas RN  Outcome: Progressing  10/13/2023 1038 by Joy Herman RN  Outcome: Progressing     Problem: Skin/Tissue Integrity  Goal: Absence of new skin breakdown  Description: 1. Monitor for areas of redness and/or skin breakdown  2. Assess vascular access sites hourly  3. Every 4-6 hours minimum:  Change oxygen saturation probe site  4. Every 4-6 hours:  If on nasal continuous positive airway pressure, respiratory therapy assess nares and determine need for appliance change or resting period.   10/13/2023 2332 by Jodi Rojas RN  Outcome: Progressing  10/13/2023 1038 by Joy Herman RN  Outcome: Progressing     Problem: Safety - Adult  Goal: Free from fall injury  Outcome: Progressing  Flowsheets (Taken 10/13/2023 1036 by Joy Herman RN)  Free From Fall Injury: Instruct family/caregiver on patient safety     Problem: ABCDS Injury Assessment  Goal: Absence of physical injury  10/13/2023 2332 by Jodi Rojas RN  Outcome: Progressing  10/13/2023 1038 by Joy Herman RN  Outcome: Progressing

## 2023-10-14 NOTE — PROGRESS NOTES
Patient states he currently sees Dr. Mendel Hua. RN sent nephrology consult to Dr. Mendel Hua for persistent hypokalemia and alkalosis per Dr. Zaira Titus.

## 2023-10-14 NOTE — PROGRESS NOTES
RN notified Dr. Elvia Henry via [employer] that patient's K+ is 2.8. IV protocol initiated. Patient's peripheral IV started to leak at start of K+ IV infusion. Two RNs attempted additional peripheral IV site with no success. IV Team requested via [employer].

## 2023-10-15 LAB
ANION GAP SERPL CALCULATED.3IONS-SCNC: 17 MMOL/L (ref 7–16)
BUN SERPL-MCNC: 46 MG/DL (ref 6–23)
CALCIUM SERPL-MCNC: 9.6 MG/DL (ref 8.6–10.2)
CHLORIDE SERPL-SCNC: 96 MMOL/L (ref 98–107)
CO2 SERPL-SCNC: 27 MMOL/L (ref 22–29)
CREAT SERPL-MCNC: 1.7 MG/DL (ref 0.7–1.2)
GFR SERPL CREATININE-BSD FRML MDRD: 44 ML/MIN/1.73M2
GLUCOSE SERPL-MCNC: 126 MG/DL (ref 74–99)
POTASSIUM SERPL-SCNC: 3.7 MMOL/L (ref 3.5–5)
POTASSIUM SERPL-SCNC: 4.1 MMOL/L (ref 3.5–5)
SODIUM SERPL-SCNC: 140 MMOL/L (ref 132–146)

## 2023-10-15 PROCEDURE — 2580000003 HC RX 258: Performed by: HOSPITALIST

## 2023-10-15 PROCEDURE — 94640 AIRWAY INHALATION TREATMENT: CPT

## 2023-10-15 PROCEDURE — 2140000000 HC CCU INTERMEDIATE R&B

## 2023-10-15 PROCEDURE — 97535 SELF CARE MNGMENT TRAINING: CPT

## 2023-10-15 PROCEDURE — 36415 COLL VENOUS BLD VENIPUNCTURE: CPT

## 2023-10-15 PROCEDURE — 97530 THERAPEUTIC ACTIVITIES: CPT

## 2023-10-15 PROCEDURE — 6370000000 HC RX 637 (ALT 250 FOR IP): Performed by: STUDENT IN AN ORGANIZED HEALTH CARE EDUCATION/TRAINING PROGRAM

## 2023-10-15 PROCEDURE — 84132 ASSAY OF SERUM POTASSIUM: CPT

## 2023-10-15 PROCEDURE — 6360000002 HC RX W HCPCS: Performed by: HOSPITALIST

## 2023-10-15 PROCEDURE — 80048 BASIC METABOLIC PNL TOTAL CA: CPT

## 2023-10-15 PROCEDURE — 6370000000 HC RX 637 (ALT 250 FOR IP): Performed by: HOSPITALIST

## 2023-10-15 PROCEDURE — 6370000000 HC RX 637 (ALT 250 FOR IP): Performed by: INTERNAL MEDICINE

## 2023-10-15 RX ORDER — POLYETHYLENE GLYCOL 3350 17 G/17G
17 POWDER, FOR SOLUTION ORAL ONCE
Status: DISCONTINUED | OUTPATIENT
Start: 2023-10-15 | End: 2023-10-16 | Stop reason: HOSPADM

## 2023-10-15 RX ADMIN — DICYCLOMINE HYDROCHLORIDE 40 MG: 10 CAPSULE ORAL at 14:17

## 2023-10-15 RX ADMIN — POTASSIUM CHLORIDE 30 MEQ: 1500 TABLET, EXTENDED RELEASE ORAL at 08:47

## 2023-10-15 RX ADMIN — IPRATROPIUM BROMIDE 0.5 MG: 0.5 SOLUTION RESPIRATORY (INHALATION) at 08:28

## 2023-10-15 RX ADMIN — IPRATROPIUM BROMIDE 0.5 MG: 0.5 SOLUTION RESPIRATORY (INHALATION) at 20:25

## 2023-10-15 RX ADMIN — METOPROLOL SUCCINATE 25 MG: 25 TABLET, EXTENDED RELEASE ORAL at 08:47

## 2023-10-15 RX ADMIN — DICYCLOMINE HYDROCHLORIDE 40 MG: 10 CAPSULE ORAL at 08:47

## 2023-10-15 RX ADMIN — DOCUSATE SODIUM 100 MG: 100 CAPSULE, LIQUID FILLED ORAL at 08:47

## 2023-10-15 RX ADMIN — ARFORMOTEROL TARTRATE 15 MCG: 15 SOLUTION RESPIRATORY (INHALATION) at 20:26

## 2023-10-15 RX ADMIN — GABAPENTIN 300 MG: 300 CAPSULE ORAL at 08:46

## 2023-10-15 RX ADMIN — OXYCODONE HYDROCHLORIDE 5 MG: 5 TABLET ORAL at 22:05

## 2023-10-15 RX ADMIN — METOPROLOL SUCCINATE 25 MG: 25 TABLET, EXTENDED RELEASE ORAL at 20:16

## 2023-10-15 RX ADMIN — DICYCLOMINE HYDROCHLORIDE 40 MG: 10 CAPSULE ORAL at 20:16

## 2023-10-15 RX ADMIN — GABAPENTIN 300 MG: 300 CAPSULE ORAL at 14:17

## 2023-10-15 RX ADMIN — IPRATROPIUM BROMIDE 0.5 MG: 0.5 SOLUTION RESPIRATORY (INHALATION) at 11:40

## 2023-10-15 RX ADMIN — Medication 2500 UNITS: at 08:47

## 2023-10-15 RX ADMIN — GABAPENTIN 300 MG: 300 CAPSULE ORAL at 20:16

## 2023-10-15 RX ADMIN — IPRATROPIUM BROMIDE 0.5 MG: 0.5 SOLUTION RESPIRATORY (INHALATION) at 16:29

## 2023-10-15 RX ADMIN — SODIUM CHLORIDE, PRESERVATIVE FREE 10 ML: 5 INJECTION INTRAVENOUS at 20:16

## 2023-10-15 RX ADMIN — BUDESONIDE 250 MCG: 0.25 INHALANT RESPIRATORY (INHALATION) at 08:28

## 2023-10-15 RX ADMIN — TOPIRAMATE 25 MG: 25 TABLET, FILM COATED ORAL at 08:47

## 2023-10-15 RX ADMIN — ALLOPURINOL 100 MG: 100 TABLET ORAL at 08:47

## 2023-10-15 RX ADMIN — BUDESONIDE 250 MCG: 0.25 INHALANT RESPIRATORY (INHALATION) at 20:26

## 2023-10-15 RX ADMIN — SODIUM CHLORIDE, PRESERVATIVE FREE 10 ML: 5 INJECTION INTRAVENOUS at 08:48

## 2023-10-15 RX ADMIN — POTASSIUM CHLORIDE 30 MEQ: 1500 TABLET, EXTENDED RELEASE ORAL at 16:59

## 2023-10-15 RX ADMIN — ESCITALOPRAM OXALATE 10 MG: 10 TABLET ORAL at 08:47

## 2023-10-15 RX ADMIN — ARFORMOTEROL TARTRATE 15 MCG: 15 SOLUTION RESPIRATORY (INHALATION) at 08:28

## 2023-10-15 ASSESSMENT — PAIN DESCRIPTION - DESCRIPTORS: DESCRIPTORS: ACHING;DISCOMFORT;SORE

## 2023-10-15 ASSESSMENT — PAIN DESCRIPTION - ORIENTATION: ORIENTATION: MID

## 2023-10-15 ASSESSMENT — PAIN SCALES - GENERAL
PAINLEVEL_OUTOF10: 8
PAINLEVEL_OUTOF10: 0

## 2023-10-15 ASSESSMENT — PAIN - FUNCTIONAL ASSESSMENT: PAIN_FUNCTIONAL_ASSESSMENT: ACTIVITIES ARE NOT PREVENTED

## 2023-10-15 ASSESSMENT — PAIN DESCRIPTION - LOCATION: LOCATION: BUTTOCKS

## 2023-10-15 NOTE — PROGRESS NOTES
Occupational Therapy  OT BEDSIDE TREATMENT NOTE    [facility] [address] [place] [address] [address], [place]      IZSY:  Patient Name: Rupal Adame  MRN: [id number]  : 1955  Room: [address] [place] [address] #[id number]     Referring Provider: Adria Shen MD  Specific Provider Orders/Date: OT eval and treat 10/12/23      Diagnosis: Hypokalemia [E87.6]   Pt admitted to hospital on 10/11/23 for abnormal labs     Pertinent Medical History:  has a past medical history of A-V fistula ([address]), Arthritis, Back pain, CKD (chronic kidney disease), HTN (hypertension), Keratoconus, Kidney disease, Lymphadenopathy, Migraine, Prolonged emergence from general anesthesia, and SOB (shortness of breath).          Precautions:  Fall Risk, B heel protectors, bed alarm     Assessment of current deficits    [x] Functional mobility         [x]ADLs           [x] Strength                  []Cognition    [x] Functional transfers       [x] IADLs         [x] Safety Awareness   [x]Endurance    [] Fine Coordination                      [x] Balance      [] Vision/perception   []Sensation      []Gross Motor Coordination          [] ROM           [] Delirium                   [] Motor Control      OT PLAN OF CARE   OT POC based on physician orders, patient diagnosis and results of clinical assessment     Frequency/Duration 1-3 days/wk for 2 weeks PRN   Specific OT Treatment Interventions to include:   * Instruction/training on adapted ADL techniques and AE recommendations to increase functional independence within precautions       * Training on energy conservation strategies, correct breathing pattern and techniques to improve independence/tolerance for self-care routine  * Functional transfer/mobility training/DME recommendations for increased independence, safety, and fall prevention  * Patient/Family education to increase follow through with safety techniques and Functional Mobility Moderate Assist w/ w/w  Lateral sidesteps to HOB Mod A  Stand pivot transfer EOB>bedside chair  Stand by Assist    Balance Sitting:     Static: SBA    Dynamic: Min A  Standing: Mod A w/ w/w Sitting:     Static: SBA    Dynamic: Min A  Standing: Mod A w/ w/w      Activity Tolerance Fair- Fair-  Fair+   Visual/  Perceptual Glasses: yes                        Comments: Upon arrival pt supine in bed. Pt educated on techniques to increase independence and safety during ADL's, bed mobility, and functional transfers. Increased breaks requested between functional activities with cuing for attention to task. At end of session pt left seated in bedside chair, call light within reach. Pt has made fair progress towards set goals.      Continue with current plan of care    Treatment Time In: 9:04            Treatment Time Out: 9:49             Treatment Charges: Mins Units   Ther Ex  72312     Manual Therapy [address]     Thera Activities 00418 20 1   ADL/Home Mgt 61432 25 2   Neuro Re-ed 16939     Group Therapy      Orthotic manage/training  31105     Non-Billable Time     Total Timed Treatment 45 1201 [facility]

## 2023-10-15 NOTE — PLAN OF CARE
Problem: Chronic Conditions and Co-morbidities  Goal: Patient's chronic conditions and co-morbidity symptoms are monitored and maintained or improved  10/15/2023 0718 by Aarti Virk RN  Outcome: Progressing  10/14/2023 2001 by Aarti Virk RN  Outcome: Progressing     Problem: Discharge Planning  Goal: Discharge to home or other facility with appropriate resources  10/15/2023 0718 by Aarti Virk RN  Outcome: Progressing  10/14/2023 2001 by Aarti Virk RN  Outcome: Progressing     Problem: Pain  Goal: Verbalizes/displays adequate comfort level or baseline comfort level  10/15/2023 0718 by Aarti Virk RN  Outcome: Progressing  Flowsheets  Taken 10/15/2023 0310  Verbalizes/displays adequate comfort level or baseline comfort level: Encourage patient to monitor pain and request assistance  Taken 10/14/2023 2300  Verbalizes/displays adequate comfort level or baseline comfort level: Encourage patient to monitor pain and request assistance  10/14/2023 2001 by Aarti Virk RN  Outcome: Progressing  Flowsheets (Taken 10/14/2023 1945)  Verbalizes/displays adequate comfort level or baseline comfort level: Encourage patient to monitor pain and request assistance     Problem: Safety - Adult  Goal: Free from fall injury  10/15/2023 0718 by Aarti Virk RN  Outcome: Progressing  Flowsheets (Taken 10/15/2023 0310)  Free From Fall Injury: Instruct family/caregiver on patient safety  10/14/2023 2001 by Aarti Virk RN  Outcome: Progressing     Problem: ABCDS Injury Assessment  Goal: Absence of physical injury  10/15/2023 0718 by Aarti Virk RN  Outcome: Progressing  Flowsheets (Taken 10/15/2023 0310)  Absence of Physical Injury: Implement safety measures based on patient assessment  10/14/2023 2001 by Aarti Virk RN  Outcome: Progressing     Problem: Skin/Tissue Integrity  Goal: Absence of new skin breakdown  Description: 1.   Monitor for areas of redness and/or skin breakdown  2. Assess vascular access sites hourly  3. Every 4-6 hours minimum:  Change oxygen saturation probe site  4. Every 4-6 hours:  If on nasal continuous positive airway pressure, respiratory therapy assess nares and determine need for appliance change or resting period.   10/15/2023 0718 by German Dalton, RN  Outcome: Progressing  10/14/2023 2001 by German Dalton, RN  Outcome: Progressing

## 2023-10-15 NOTE — PROGRESS NOTES
P Quality Flow/Interdisciplinary Rounds Progress Note        Quality Flow Rounds held on October 15, 2023    Disciplines Attending:  Bedside Nurse, , , and Nursing Unit Leadership    Anand Reyes was admitted on 10/11/2023 10:54 AM    Anticipated Discharge Date:       Disposition:    Tristian Score:  Tristian Scale Score: 16    Readmission Risk              Risk of Unplanned Readmission:  26           Discussed patient goal for the day, patient clinical progression, and barriers to discharge.   The following Goal(s) of the Day/Commitment(s) have been identified:  Diagnostics - Report Results and Labs - Report Results      Ray Hull RN  October 15, 2023

## 2023-10-15 NOTE — PROGRESS NOTES
Physical Therapy  Physical Therapy Treatment    Name: Jacinta Meek  : 1955  MRN: [id number]      Date of Service: 10/15/2023    Evaluating PT:  Malcolm Richardson, PT, DPT, [id number]    Room #:  9812/3585-Y  Diagnosis:  Hypokalemia [E87.6]  PMHx/PSHx:    Past Medical History:   Diagnosis Date    A-V fistula ([address])     left arm    Arthritis     Back pain 2019    had LESI    CKD (chronic kidney disease)     stage 4    HTN (hypertension)     Keratoconus     bilateral    Kidney disease     Lymphadenopathy     lower legs    Migraine     Prolonged emergence from general anesthesia     SOB (shortness of breath)      Past Surgical History:   Procedure Laterality Date    COLONOSCOPY      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    DIALYSIS FISTULA CREATION Left 2021    CREATION AV FISTULA LEFT ARM performed by Maci Luevano MD at [facility] Left 2021    REVISION ARTERIOVENOUS FISTULA LEFT ARM performed by Maci Luevano MD at [address] [place] [address]  2002    lap tricia    UPPER GASTROINTESTINAL ENDOSCOPY N/A 2019    EGD FOREIGN BODY REMOVAL performed by Eh Downing MD at [address] [place] [address],[address] Right 3/27/2023    VENA CAVA FILTER INSERTION performed by Dion Curiel MD at [address]      Procedure/Surgery:  none this admission  Precautions:  Fall Risk, R DF weakness + Bed Alarm  Equipment Needs:  TBD    SUBJECTIVE:    Pt was admitted from [address] [place] [address]. Pt used w/c for mobility PTA. Transferred <> w/c with slide board. Pt reported working on standing in parallel bars with PT PTA. OBJECTIVE:   Initial Evaluation  Date: 10/13/23 Treatment  10/15/23 Short Term/ Long Term   Goals   AM-PAC 6 Clicks 84/95     Was pt agreeable to Eval/treatment? yes yes    Does pt have pain?  8/10 pain in \"butt\" and 5-6/10 R knee/thigh pain No c/o pain    Bed Mobility  Rolling: Charli  Supine to sit: SBA  Sit to supine: ModA  Scooting: SBA Rolling: Charli  Supine to sit: Charli  Sit to supine: NT  Scooting: SBA Rolling: Independent   Supine to sit: Independent   Sit to supine: Independent   Scooting: Independent    Transfers Sit to stand: ModA (x2 reps)  Stand to sit: ModA  Stand pivot: NT - pt declined Sit to stand: [address]   Stand to sit: ModA  Stand pivot: ModA with [employer] Sit to stand: SBA  Stand to sit: SBA  Stand pivot: SBA with AD   Ambulation    ~3 side steps R/L with [employer] ModA  NT >20 feet with AD SBA   Stair negotiation: ascended and descended  NT NT TBD   ROM BUE:  Refer to OT note  BLE:  WFL     Strength BUE:  Refer to OT note  BLE:  4/5 except R DF 3+/5  Improve by 1/3 MMT   Balance Sitting EOB:  SBA  Dynamic Standing:  ModA with [employer] Sitting EOB:  SBA  Dynamic Standing:  ModA with [employer] Sitting EOB:  Independent   Dynamic Standing:  SBA with AD     Pt is A & O x 3  Sensation:  NT  Edema:  BLE pitting edema 3+    Vitals:  SPO2 on room air: 96%      Patient education  Pt educated on role of PT, safety during mobility    Patient response to education:   Pt verbalized understanding Pt demonstrated skill Pt requires further education in this area   yes yes yes     ASSESSMENT:    Comments:  pt semi-supine in bed upon entry and agreeable to PT treatment. Pt requested to use urinal prior to mobility -- time provided for pt to void. Assist to LLE required for supine to sit. Pt sat EOB for approximately 20 minutes for static and dynamic UE tasks requiring rest breaks d/t fatigue. STS from EOB x2 with lift assist. Pivot to chair with [employer] and similar assist. Pt required assist for hip extension during dynamic standing with WW. Pt positioned in chair at end of session. RN aware of pt positioning and performance. All needs met and call light in reach. All lines remained intact. Treatment:  Patient practiced and was instructed in the following treatment:    Bed Mobility: VCs provided for sequencing and safety during mobility.  Manual assist provided for completion of

## 2023-10-15 NOTE — PLAN OF CARE
Problem: Chronic Conditions and Co-morbidities  Goal: Patient's chronic conditions and co-morbidity symptoms are monitored and maintained or improved  Outcome: Progressing     Problem: Discharge Planning  Goal: Discharge to home or other facility with appropriate resources  Outcome: Progressing     Problem: Pain  Goal: Verbalizes/displays adequate comfort level or baseline comfort level  Outcome: Progressing  Flowsheets (Taken 10/14/2023 1945)  Verbalizes/displays adequate comfort level or baseline comfort level: Encourage patient to monitor pain and request assistance     Problem: Skin/Tissue Integrity  Goal: Absence of new skin breakdown  Description: 1. Monitor for areas of redness and/or skin breakdown  2. Assess vascular access sites hourly  3. Every 4-6 hours minimum:  Change oxygen saturation probe site  4. Every 4-6 hours:  If on nasal continuous positive airway pressure, respiratory therapy assess nares and determine need for appliance change or resting period.   Outcome: Progressing     Problem: ABCDS Injury Assessment  Goal: Absence of physical injury  Outcome: Progressing     Problem: Safety - Adult  Goal: Free from fall injury  Outcome: Progressing

## 2023-10-16 VITALS
DIASTOLIC BLOOD PRESSURE: 63 MMHG | OXYGEN SATURATION: 98 % | WEIGHT: 231.04 LBS | TEMPERATURE: 97.8 F | SYSTOLIC BLOOD PRESSURE: 108 MMHG | BODY MASS INDEX: 36.26 KG/M2 | RESPIRATION RATE: 14 BRPM | HEIGHT: 67 IN | HEART RATE: 69 BPM

## 2023-10-16 LAB
ANION GAP SERPL CALCULATED.3IONS-SCNC: 16 MMOL/L (ref 7–16)
BUN SERPL-MCNC: 42 MG/DL (ref 6–23)
CALCIUM SERPL-MCNC: 9.8 MG/DL (ref 8.6–10.2)
CHLORIDE SERPL-SCNC: 100 MMOL/L (ref 98–107)
CO2 SERPL-SCNC: 27 MMOL/L (ref 22–29)
CREAT SERPL-MCNC: 1.5 MG/DL (ref 0.7–1.2)
GFR SERPL CREATININE-BSD FRML MDRD: 51 ML/MIN/1.73M2
GLUCOSE SERPL-MCNC: 82 MG/DL (ref 74–99)
MAGNESIUM SERPL-MCNC: 2.2 MG/DL (ref 1.6–2.6)
PHOSPHATE SERPL-MCNC: 3.6 MG/DL (ref 2.5–4.5)
POTASSIUM SERPL-SCNC: 3.5 MMOL/L (ref 3.5–5)
SODIUM SERPL-SCNC: 143 MMOL/L (ref 132–146)

## 2023-10-16 PROCEDURE — 6370000000 HC RX 637 (ALT 250 FOR IP): Performed by: STUDENT IN AN ORGANIZED HEALTH CARE EDUCATION/TRAINING PROGRAM

## 2023-10-16 PROCEDURE — 6370000000 HC RX 637 (ALT 250 FOR IP): Performed by: INTERNAL MEDICINE

## 2023-10-16 PROCEDURE — 36415 COLL VENOUS BLD VENIPUNCTURE: CPT

## 2023-10-16 PROCEDURE — 80048 BASIC METABOLIC PNL TOTAL CA: CPT

## 2023-10-16 PROCEDURE — 97530 THERAPEUTIC ACTIVITIES: CPT

## 2023-10-16 PROCEDURE — 97535 SELF CARE MNGMENT TRAINING: CPT

## 2023-10-16 PROCEDURE — 94640 AIRWAY INHALATION TREATMENT: CPT

## 2023-10-16 PROCEDURE — 83735 ASSAY OF MAGNESIUM: CPT

## 2023-10-16 PROCEDURE — 84100 ASSAY OF PHOSPHORUS: CPT

## 2023-10-16 PROCEDURE — 6360000002 HC RX W HCPCS: Performed by: HOSPITALIST

## 2023-10-16 PROCEDURE — 6370000000 HC RX 637 (ALT 250 FOR IP): Performed by: HOSPITALIST

## 2023-10-16 PROCEDURE — 2580000003 HC RX 258: Performed by: HOSPITALIST

## 2023-10-16 PROCEDURE — 6370000000 HC RX 637 (ALT 250 FOR IP): Performed by: FAMILY MEDICINE

## 2023-10-16 RX ORDER — ESCITALOPRAM OXALATE 10 MG/1
10 TABLET ORAL DAILY
Qty: 30 TABLET | Refills: 3 | Status: SHIPPED | OUTPATIENT
Start: 2023-10-17

## 2023-10-16 RX ORDER — POTASSIUM CHLORIDE 20 MEQ/1
40 TABLET, EXTENDED RELEASE ORAL 2 TIMES DAILY WITH MEALS
Qty: 60 TABLET | Refills: 3 | Status: SHIPPED | OUTPATIENT
Start: 2023-10-16

## 2023-10-16 RX ORDER — ALPRAZOLAM 0.5 MG/1
0.5 TABLET ORAL DAILY PRN
Qty: 3 TABLET | Refills: 0 | Status: SHIPPED | OUTPATIENT
Start: 2023-10-16 | End: 2023-10-19

## 2023-10-16 RX ORDER — POTASSIUM CHLORIDE 20 MEQ/1
40 TABLET, EXTENDED RELEASE ORAL 2 TIMES DAILY WITH MEALS
Status: DISCONTINUED | OUTPATIENT
Start: 2023-10-16 | End: 2023-10-16 | Stop reason: HOSPADM

## 2023-10-16 RX ADMIN — OXYCODONE HYDROCHLORIDE 5 MG: 5 TABLET ORAL at 09:27

## 2023-10-16 RX ADMIN — DICYCLOMINE HYDROCHLORIDE 40 MG: 10 CAPSULE ORAL at 13:47

## 2023-10-16 RX ADMIN — DOCUSATE SODIUM 100 MG: 100 CAPSULE, LIQUID FILLED ORAL at 08:11

## 2023-10-16 RX ADMIN — IPRATROPIUM BROMIDE 0.5 MG: 0.5 SOLUTION RESPIRATORY (INHALATION) at 12:30

## 2023-10-16 RX ADMIN — ALLOPURINOL 100 MG: 100 TABLET ORAL at 08:09

## 2023-10-16 RX ADMIN — POTASSIUM CHLORIDE 30 MEQ: 1500 TABLET, EXTENDED RELEASE ORAL at 08:13

## 2023-10-16 RX ADMIN — Medication 2500 UNITS: at 08:14

## 2023-10-16 RX ADMIN — GABAPENTIN 300 MG: 300 CAPSULE ORAL at 13:48

## 2023-10-16 RX ADMIN — POTASSIUM CHLORIDE 40 MEQ: 1500 TABLET, EXTENDED RELEASE ORAL at 17:34

## 2023-10-16 RX ADMIN — TOPIRAMATE 25 MG: 25 TABLET, FILM COATED ORAL at 08:14

## 2023-10-16 RX ADMIN — IPRATROPIUM BROMIDE 0.5 MG: 0.5 SOLUTION RESPIRATORY (INHALATION) at 16:39

## 2023-10-16 RX ADMIN — GABAPENTIN 300 MG: 300 CAPSULE ORAL at 08:11

## 2023-10-16 RX ADMIN — ESCITALOPRAM OXALATE 10 MG: 10 TABLET ORAL at 08:11

## 2023-10-16 RX ADMIN — POTASSIUM CHLORIDE 40 MEQ: 1500 TABLET, EXTENDED RELEASE ORAL at 10:33

## 2023-10-16 RX ADMIN — LORAZEPAM 0.5 MG: 0.5 TABLET ORAL at 18:24

## 2023-10-16 RX ADMIN — ARFORMOTEROL TARTRATE 15 MCG: 15 SOLUTION RESPIRATORY (INHALATION) at 08:51

## 2023-10-16 RX ADMIN — BUDESONIDE 250 MCG: 0.25 INHALANT RESPIRATORY (INHALATION) at 08:51

## 2023-10-16 RX ADMIN — IPRATROPIUM BROMIDE 0.5 MG: 0.5 SOLUTION RESPIRATORY (INHALATION) at 08:51

## 2023-10-16 RX ADMIN — OXYCODONE HYDROCHLORIDE 5 MG: 5 TABLET ORAL at 15:11

## 2023-10-16 RX ADMIN — DICYCLOMINE HYDROCHLORIDE 40 MG: 10 CAPSULE ORAL at 08:10

## 2023-10-16 RX ADMIN — SODIUM CHLORIDE, PRESERVATIVE FREE 10 ML: 5 INJECTION INTRAVENOUS at 08:15

## 2023-10-16 RX ADMIN — METOPROLOL SUCCINATE 25 MG: 25 TABLET, EXTENDED RELEASE ORAL at 08:12

## 2023-10-16 ASSESSMENT — PAIN SCALES - GENERAL
PAINLEVEL_OUTOF10: 0
PAINLEVEL_OUTOF10: 7
PAINLEVEL_OUTOF10: 0
PAINLEVEL_OUTOF10: 7

## 2023-10-16 NOTE — PLAN OF CARE
Problem: Chronic Conditions and Co-morbidities  Goal: Patient's chronic conditions and co-morbidity symptoms are monitored and maintained or improved  10/15/2023 2000 by Fan Cordero RN  Outcome: Progressing  10/15/2023 0718 by Flo Flannery RN  Outcome: Progressing     Problem: Discharge Planning  Goal: Discharge to home or other facility with appropriate resources  10/15/2023 2000 by Fan Cordero RN  Outcome: Progressing  10/15/2023 0718 by Flo Flannery RN  Outcome: Progressing

## 2023-10-16 NOTE — CARE COORDINATION
Transition of care update: K 3.5, Cr 1.5 and other labs noted. Nephrology following. Per Lalo Burt with Darreld Catina of the [facility], they received auth from [employer] and it is good until Wednesday 10/18. Ambulance form was started. Will need dated and signed on day of dc. DC envelope was placed with pt's soft chart. [facility] was faxing EXEMPT to [address] on Friday 10/13. Ora/maritza will follow. 1435  Discharge order on chart. Spoke with Dinorah Byrnes with [facility] and pt is setup for 6pm stretcher . Ambulance form was completed. Lalo Burt with [address] was notified of  time. Met with pt and pt's wife in room and were notified of  time of 6pm to [address].

## 2023-10-16 NOTE — PROGRESS NOTES
techniques and functional independence  * Recommendation of environmental modifications for increased safety with functional transfers/mobility and ADLs  * Therapeutic exercise to improve motor endurance, ROM, and functional strength for ADLs/functional transfers  * Therapeutic activities to facilitate/challenge dynamic balance, stand tolerance for increased safety and independence with ADLs  * Therapeutic activities to facilitate gross/fine motor skills for increased independence with ADLs     Recommended Adaptive Equipment: TBD      Home Living: Pt admitted from [facility]     Prior Level of Function: assist to dependent with ADLs; non-ambulatory, completes functional stand pivot transfers><w/c w/o AD     Pain Level: Pt reported R LE pain rated at 4/10; Therapist facilitated repositioning to address pain      Cognition: A&O: 4/4; Follows 2 step directions           Memory:  good            Sequencing:  fair            Problem solving:  fair            Judgement/safety:  fair -             Functional Assessment:  AM-PAC Daily Activity Raw Score: 15/24    Initial Eval Status  Date: 10/13/23 Treatment Status  Date: 10/16/23 STGs = LTGs  Time frame: 10-14 days   Feeding Supervision/setup   set up Modified Fleming    Grooming Minimal Assist   Seated EOB  SBA  To wash face and apply deodorant seated Modified [place]    UB Dressing Minimal Assist   Gown  Minimal Assist  To don/doff gown seated Supervision    LB Dressing Dependent  Max A  To don pants seated and standing to pull over hips  Moderate Assist    Bathing Maximal Assist Max A  Seated and standing for posterior  Minimal Assist    Toileting Dependent   Bowel management Max A    Toileting hygiene task Minimal Assist    Bed Mobility  Supine to sit: Stand by Assist   Sit to supine: Moderate Assist   Min A    Educated pt on technique and placement to increase independence.    Supine to sit: Supervision   Sit to supine: Supervision    Functional manage/training  [id number]     Non-Billable Time     Total Timed Treatment 26 2        [address] OTR/L #[id number]

## 2023-10-16 NOTE — PATIENT CARE CONFERENCE
[facility] Quality Flow/Interdisciplinary Rounds Progress Note        Quality Flow Rounds held on October 16, 2023    Disciplines Attending:  Bedside Nurse, , , and Nursing Unit Leadership    Machelle Henry was admitted on 10/11/2023 10:54 AM    Anticipated Discharge Date:       Disposition:    Tristian Score:  Tristian Scale Score: 16    Readmission Risk              Risk of Unplanned Readmission:  26           Discussed patient goal for the day, patient clinical progression, and barriers to discharge.   The following Goal(s) of the Day/Commitment(s) have been identified:  Diagnostics - Report Results and Labs - Report Results      Nikki Banegas RN  October 16, 2023

## 2023-10-16 NOTE — PROGRESS NOTES
[facility]  Nephrology Progress Note    Patient's Name: Kely Prior    History of Present Illness from 10/15 Consult Note:    \"Patient is a 30-year-old male patient we are asked to see in regards to hypokalemia and alkalosis. Patient was initially admitted on 10/11 for hypokalemia as noted at the facility he is staying at. 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 After his acute hospitalization he was sent to a rehab facility and then discharged to a skilled nursing facility 10/6/2023. Patient was discharged from the rehab facility on Lasix 80 mg p.o. 3 times daily and potassium chloride 20 mEq daily orally. Report of potassium at the skilled nursing facility was 1.8 mmol/L. Patient was then sent to the emergency department for management of his profound hypokalemia. Patient is followed longitudinally in the office by Dr. Janet Hernandez and was last seen in July 2023. He had placement of a left AV fistula in February 2021 and is followed with Dr. Camelia Andrea. His baseline serum creatinine is 1.7 to 2.3 mg/dL for several years. He was seen and examined this morning sitting in the chair at the bedside. He states that he has had some issues with constipation but has been urinating well. Does have lower extremity edema. Denies any shortness of breath or chest pain at this time\"    Subjective:    10/16: Patient was seen and examined. He reports that he feels all right. He denies any chest pain or shortness of breath.     PMH:    Past Medical History:   Diagnosis Date    A-V fistula ([address])     left arm    Arthritis     Back pain 2019    had LESI    CKD (chronic kidney disease)     stage 4    HTN (hypertension)     Keratoconus     bilateral    Kidney disease     Lymphadenopathy     lower legs    Migraine     Prolonged emergence from general anesthesia     SOB (shortness of breath)        Patient Active Problem List \"ALT\", \"AST\", \"ALKPHOS\", \"BILITOT\", \"BILIDIR\" in the last 72 hours. No results for input(s): \"LABALBU\" in the last 72 hours. Ferritin   Date Value Ref Range Status   04/25/2019 485 ng/mL Final     Comment:     FERRITIN Reference Ranges:  Adult Males   20 - 60 yrars:    30 - 400 ng/mL  Adult females 16 - 60 years:    15 - 150 ng/mL  Adults greater than 60 years:   no established reference range  Pediatrics:                     no established reference range       Iron   Date Value Ref Range Status   04/25/2019 66 59 - 158 mcg/dL Final     TIBC   Date Value Ref Range Status   04/25/2019 261 250 - 450 mcg/dL Final       No results found for: \"OBBROENY05\"    No results found for: \"FOLATE\"    Lab Results   Component Value Date/Time    COLORU Yellow 03/12/2023 06:22 PM    NITRU Negative 03/12/2023 06:22 PM    GLUCOSEU Negative 03/12/2023 06:22 PM    Serenity Rober Negative 03/12/2023 06:22 PM    UROBILINOGEN 0.2 03/12/2023 06:22 PM    BILIRUBINUR Negative 03/12/2023 06:22 PM       No results found for: \"ERNIE\", \"CREURRAN\", \"OSMOU\"    No components found for: \"URIC\"    No results found for: \"LIPIDPAN\"    Assessment and Plans:    CKD stage IIIb/IV  Baseline serum creatinine 1.7-2.3  Creatinine 1.5 today-below baseline  S/p left AV fistula creation 2/21, revision left AV fistula 4/21  Avoid nephrotoxins/NSAIDs  Strict I&O, daily weights  Monitor labs    2. Hypokalemia  Likely in setting of diuresis  Patient was on Lasix prior to admission  K+ 1.8 on 10/11--> 3.5 today  On potassium supplement 30 mEq oral twice daily  Monitor labs    3. Hypertension with CKD I-IV  BP goal<130/80  BP at goal  Monitor BPs     4.   Metabolic alkalosis  Likely contraction alkalosis in the setting of diuresis  CO2 42 on 10/14--> 27 today  Monitor labs    KRZYSZTOF Maria - CNP    Patient seen and examined all key components of the physical performed independently , case discussed with NP, all pertinent labs and radiologic tests personally

## 2023-10-26 NOTE — PROGRESS NOTES
Physician Progress Note      PATIENTLynda Briceño  [facility] #:                  [id number]  :                       1955  ADMIT DATE:       10/11/2023 10:54 AM  DISCH DATE:        10/16/2023 9:04 PM  RESPONDING  PROVIDER #:        Adenike Nelson MD          QUERY TEXT:    Dear Provider,    Patient presents with hypokalemia. Noted nephrology consultant documentation   of \"chronic kidney disease stage IIIb/IV\" and internal medicine notes \"CKD   stage IV\". If possible, please clarify the stage of CKD in progress notes and discharge   summary you are evaluating and /or treating: The medical record reflects the following:  Risk Factors: Hx CKD  Clinical Indicators: BUN 67/Cr 2.1/GFR 34 to BUN 54/Cr 1.7/GFR 43 on discharge  Treatment: Labs, IVF, Nephrology consult, Strict I & O, daily wgt, Avoid   nephrotoxins/NSAIDS      Thank you,  Kamilah Lucio RN  Clinical Documentation Improvement  [phone]  Options provided:  -- CKD Stage 3b GFR 30-44 confirmed  -- CKD Stage 4 GFR 15-29 confirmed  -- Other - I will add my own diagnosis  -- Disagree - Not applicable / Not valid  -- Disagree - Clinically unable to determine / Unknown  -- Refer to Clinical Documentation Reviewer    PROVIDER RESPONSE TEXT:    After study, CKD stage 3b confirmed.     Query created by: Kamilah Lucio on 10/19/2023 8:20 AM      Electronically signed by:  Adenike Nelson MD 10/26/2023 7:57 PM

## 2023-11-04 ENCOUNTER — ANESTHESIA EVENT (OUTPATIENT)
Dept: MRI IMAGING | Age: 68
End: 2023-11-04
Payer: MEDICARE

## 2023-11-06 ENCOUNTER — ANESTHESIA (OUTPATIENT)
Dept: MRI IMAGING | Age: 68
End: 2023-11-06
Payer: MEDICARE

## 2023-11-06 ENCOUNTER — HOSPITAL ENCOUNTER (OUTPATIENT)
Dept: MRI IMAGING | Age: 68
Discharge: HOME | End: 2023-11-08
Payer: MEDICARE

## 2023-11-06 VITALS
DIASTOLIC BLOOD PRESSURE: 81 MMHG | RESPIRATION RATE: 18 BRPM | SYSTOLIC BLOOD PRESSURE: 145 MMHG | HEART RATE: 72 BPM | OXYGEN SATURATION: 94 %

## 2023-11-06 DIAGNOSIS — D36.10 SCHWANNOMA: ICD-10-CM

## 2023-11-06 PROCEDURE — 2500000003 HC RX 250 WO HCPCS

## 2023-11-06 PROCEDURE — 2580000003 HC RX 258

## 2023-11-06 PROCEDURE — 7100000010 HC PHASE II RECOVERY - FIRST 15 MIN

## 2023-11-06 PROCEDURE — 6360000002 HC RX W HCPCS

## 2023-11-06 PROCEDURE — 6360000004 HC RX CONTRAST MEDICATION: Performed by: RADIOLOGY

## 2023-11-06 PROCEDURE — A9579 GAD-BASE MR CONTRAST NOS,1ML: HCPCS | Performed by: RADIOLOGY

## 2023-11-06 PROCEDURE — 3700000000 HC ANESTHESIA ATTENDED CARE

## 2023-11-06 PROCEDURE — 72157 MRI CHEST SPINE W/O & W/DYE: CPT

## 2023-11-06 PROCEDURE — 3700000001 HC ADD 15 MINUTES (ANESTHESIA)

## 2023-11-06 PROCEDURE — 7100000011 HC PHASE II RECOVERY - ADDTL 15 MIN

## 2023-11-06 RX ORDER — MIDAZOLAM HYDROCHLORIDE 1 MG/ML
INJECTION INTRAMUSCULAR; INTRAVENOUS PRN
Status: DISCONTINUED | OUTPATIENT
Start: 2023-11-06 | End: 2023-11-06 | Stop reason: SDUPTHER

## 2023-11-06 RX ORDER — SODIUM CHLORIDE 9 MG/ML
INJECTION, SOLUTION INTRAVENOUS CONTINUOUS PRN
Status: DISCONTINUED | OUTPATIENT
Start: 2023-11-06 | End: 2023-11-06 | Stop reason: SDUPTHER

## 2023-11-06 RX ORDER — FENTANYL CITRATE 50 UG/ML
INJECTION, SOLUTION INTRAMUSCULAR; INTRAVENOUS PRN
Status: DISCONTINUED | OUTPATIENT
Start: 2023-11-06 | End: 2023-11-06 | Stop reason: SDUPTHER

## 2023-11-06 RX ORDER — KETAMINE HYDROCHLORIDE 50 MG/ML
INJECTION, SOLUTION, CONCENTRATE INTRAMUSCULAR; INTRAVENOUS PRN
Status: DISCONTINUED | OUTPATIENT
Start: 2023-11-06 | End: 2023-11-06 | Stop reason: SDUPTHER

## 2023-11-06 RX ADMIN — FENTANYL CITRATE 25 MCG: 50 INJECTION INTRAMUSCULAR; INTRAVENOUS at 08:16

## 2023-11-06 RX ADMIN — MIDAZOLAM 1 MG: 1 INJECTION INTRAMUSCULAR; INTRAVENOUS at 08:09

## 2023-11-06 RX ADMIN — FENTANYL CITRATE 25 MCG: 50 INJECTION INTRAMUSCULAR; INTRAVENOUS at 08:49

## 2023-11-06 RX ADMIN — FENTANYL CITRATE 25 MCG: 50 INJECTION INTRAMUSCULAR; INTRAVENOUS at 08:36

## 2023-11-06 RX ADMIN — KETAMINE HYDROCHLORIDE 20 MG: 50 INJECTION, SOLUTION INTRAMUSCULAR; INTRAVENOUS at 08:11

## 2023-11-06 RX ADMIN — FENTANYL CITRATE 25 MCG: 50 INJECTION INTRAMUSCULAR; INTRAVENOUS at 08:11

## 2023-11-06 RX ADMIN — MIDAZOLAM 1 MG: 1 INJECTION INTRAMUSCULAR; INTRAVENOUS at 08:11

## 2023-11-06 RX ADMIN — KETAMINE HYDROCHLORIDE 20 MG: 50 INJECTION, SOLUTION INTRAMUSCULAR; INTRAVENOUS at 08:36

## 2023-11-06 RX ADMIN — KETAMINE HYDROCHLORIDE 10 MG: 50 INJECTION, SOLUTION INTRAMUSCULAR; INTRAVENOUS at 08:49

## 2023-11-06 RX ADMIN — GADOTERIDOL 20 ML: 279.3 INJECTION, SOLUTION INTRAVENOUS at 08:58

## 2023-11-06 RX ADMIN — SODIUM CHLORIDE: 9 INJECTION, SOLUTION INTRAVENOUS at 08:07

## 2023-11-06 ASSESSMENT — ENCOUNTER SYMPTOMS: SHORTNESS OF BREATH: 1

## 2023-11-06 NOTE — ANESTHESIA POSTPROCEDURE EVALUATION
Department of Anesthesiology  Postprocedure Note    Patient: Randal España  MRN: [id number]  YOB: 1955  Date of evaluation: 11/6/2023      Procedure Summary     Date: 11/06/23 Room / Location: [address] [place] [address] MRI; [facility] Procedures; [facility] Radiology    Anesthesia Start: 6082 Anesthesia Stop: 3456    Procedure: MRI THORACIC SPINE W WO CONTRAST Diagnosis: Schwannoma    Scheduled Providers: Claudean Gab, APRN - CRNA Responsible Provider: Scarlet Uriostegui DO    Anesthesia Type: MAC ASA Status: 4          Anesthesia Type: No value filed.     Edgar Phase I:      Edgar Phase II: Edgar Score: 10      Anesthesia Post Evaluation    Patient location during evaluation: PACU  Patient participation: complete - patient participated  Level of consciousness: awake and alert  Nausea & Vomiting: no vomiting and no nausea  Complications: no  Cardiovascular status: hemodynamically stable  Respiratory status: acceptable and spontaneous ventilation  Hydration status: stable  Pain management: adequate

## 2023-11-06 NOTE — PROCEDURES
0860 Patient returned from MRI with anesthesia. Patient stable. No s/s of complications noted or reported. Vitals will be checked q 15min, see flow sheets. 8887 Patient eating and drinking well with no s/s of complications noted or reported. Wife at bedside    56 Discharge papers reviewed with patient, questions answered, discharge paper signed. 21  Patient taken [facility]. Patient in stable condition, no s/s of complications noted or reported.

## 2023-11-23 LAB
25(OH)D3 SERPL-MCNC: 50.3 NG/ML (ref 30–100)
ALBUMIN SERPL-MCNC: 3.9 G/DL (ref 3.5–5.2)
ALP SERPL-CCNC: 102 U/L (ref 40–129)
ALT SERPL-CCNC: 15 U/L (ref 0–40)
ANION GAP SERPL CALCULATED.3IONS-SCNC: 14 MMOL/L (ref 7–16)
AST SERPL-CCNC: 17 U/L (ref 0–39)
BASOPHILS # BLD: 0.09 K/UL (ref 0–0.2)
BASOPHILS NFR BLD: 1 % (ref 0–2)
BILIRUB SERPL-MCNC: 0.4 MG/DL (ref 0–1.2)
BUN SERPL-MCNC: 32 MG/DL (ref 6–23)
CALCIUM SERPL-MCNC: 9 MG/DL (ref 8.6–10.2)
CHLORIDE SERPL-SCNC: 107 MMOL/L (ref 98–107)
CHOLEST SERPL-MCNC: 176 MG/DL
CO2 SERPL-SCNC: 24 MMOL/L (ref 22–29)
CREAT SERPL-MCNC: 1.8 MG/DL (ref 0.7–1.2)
EOSINOPHIL # BLD: 0.43 K/UL (ref 0.05–0.5)
EOSINOPHILS RELATIVE PERCENT: 5 % (ref 0–6)
ERYTHROCYTE [DISTWIDTH] IN BLOOD BY AUTOMATED COUNT: 15.4 % (ref 11.5–15)
GFR SERPL CREATININE-BSD FRML MDRD: 42 ML/MIN/1.73M2
GLUCOSE SERPL-MCNC: 82 MG/DL (ref 74–99)
HBA1C MFR BLD: 5.1 % (ref 4–5.6)
HCT VFR BLD AUTO: 40.5 % (ref 37–54)
HDLC SERPL-MCNC: 45 MG/DL
HGB BLD-MCNC: 12.5 G/DL (ref 12.5–16.5)
IMM GRANULOCYTES # BLD AUTO: 0.03 K/UL (ref 0–0.58)
IMM GRANULOCYTES NFR BLD: 0 % (ref 0–5)
LDLC SERPL CALC-MCNC: 111 MG/DL
LYMPHOCYTES NFR BLD: 1.14 K/UL (ref 1.5–4)
LYMPHOCYTES RELATIVE PERCENT: 14 % (ref 20–42)
MCH RBC QN AUTO: 28 PG (ref 26–35)
MCHC RBC AUTO-ENTMCNC: 30.9 G/DL (ref 32–34.5)
MCV RBC AUTO: 90.6 FL (ref 80–99.9)
MONOCYTES NFR BLD: 0.73 K/UL (ref 0.1–0.95)
MONOCYTES NFR BLD: 9 % (ref 2–12)
NEUTROPHILS NFR BLD: 70 % (ref 43–80)
NEUTS SEG NFR BLD: 5.69 K/UL (ref 1.8–7.3)
PLATELET # BLD AUTO: 180 K/UL (ref 130–450)
PMV BLD AUTO: 12.8 FL (ref 7–12)
POTASSIUM SERPL-SCNC: 4.3 MMOL/L (ref 3.5–5)
PROT SERPL-MCNC: 6.1 G/DL (ref 6.4–8.3)
RBC # BLD AUTO: 4.47 M/UL (ref 3.8–5.8)
SODIUM SERPL-SCNC: 145 MMOL/L (ref 132–146)
TRIGL SERPL-MCNC: 101 MG/DL
TSH SERPL DL<=0.05 MIU/L-ACNC: 1.56 UIU/ML (ref 0.27–4.2)
URATE SERPL-MCNC: 6.7 MG/DL (ref 3.4–7)
VLDLC SERPL CALC-MCNC: 20 MG/DL
WBC OTHER # BLD: 8.1 K/UL (ref 4.5–11.5)

## 2024-09-17 ENCOUNTER — TRANSCRIBE ORDERS (OUTPATIENT)
Dept: ADMINISTRATIVE | Age: 69
End: 2024-09-17

## 2024-09-17 DIAGNOSIS — M48.062 SPINAL STENOSIS OF LUMBAR REGION WITH NEUROGENIC CLAUDICATION: Primary | ICD-10-CM

## 2024-09-30 ENCOUNTER — HOSPITAL ENCOUNTER (OUTPATIENT)
Age: 69
Discharge: HOME | End: 2024-09-30
Payer: MEDICARE

## 2024-09-30 DIAGNOSIS — E87.6 HYPOKALEMIA: ICD-10-CM

## 2024-09-30 LAB
ANION GAP SERPL CALCULATED.3IONS-SCNC: 12 MMOL/L (ref 7–16)
BUN SERPL-MCNC: 30 MG/DL (ref 6–23)
CALCIUM SERPL-MCNC: 9.5 MG/DL (ref 8.6–10.2)
CHLORIDE SERPL-SCNC: 106 MMOL/L (ref 98–107)
CO2 SERPL-SCNC: 24 MMOL/L (ref 22–29)
CREAT SERPL-MCNC: 1.6 MG/DL (ref 0.7–1.2)
GFR, ESTIMATED: 48 ML/MIN/1.73M2
GLUCOSE SERPL-MCNC: 90 MG/DL (ref 74–99)
POTASSIUM SERPL-SCNC: 4.3 MMOL/L (ref 3.5–5)
SODIUM SERPL-SCNC: 142 MMOL/L (ref 132–146)

## 2024-09-30 PROCEDURE — 80048 BASIC METABOLIC PNL TOTAL CA: CPT

## 2024-09-30 PROCEDURE — 36415 COLL VENOUS BLD VENIPUNCTURE: CPT

## 2024-10-21 ENCOUNTER — ANESTHESIA EVENT (OUTPATIENT)
Dept: MRI IMAGING | Age: 69
End: 2024-10-21
Payer: MEDICARE

## 2024-10-21 ENCOUNTER — ANESTHESIA (OUTPATIENT)
Dept: MRI IMAGING | Age: 69
End: 2024-10-21
Payer: MEDICARE

## 2024-10-21 ENCOUNTER — HOSPITAL ENCOUNTER (OUTPATIENT)
Dept: MRI IMAGING | Age: 69
Discharge: HOME | End: 2024-10-23
Payer: MEDICARE

## 2024-10-21 VITALS
OXYGEN SATURATION: 94 % | RESPIRATION RATE: 22 BRPM | SYSTOLIC BLOOD PRESSURE: 121 MMHG | TEMPERATURE: 97.1 F | DIASTOLIC BLOOD PRESSURE: 74 MMHG | HEART RATE: 52 BPM

## 2024-10-21 DIAGNOSIS — M48.062 SPINAL STENOSIS OF LUMBAR REGION WITH NEUROGENIC CLAUDICATION: ICD-10-CM

## 2024-10-21 PROCEDURE — 3700000001 HC ADD 15 MINUTES (ANESTHESIA)

## 2024-10-21 PROCEDURE — 2580000003 HC RX 258: Performed by: NURSE ANESTHETIST, CERTIFIED REGISTERED

## 2024-10-21 PROCEDURE — 6360000002 HC RX W HCPCS: Performed by: NURSE ANESTHETIST, CERTIFIED REGISTERED

## 2024-10-21 PROCEDURE — 3700000000 HC ANESTHESIA ATTENDED CARE

## 2024-10-21 PROCEDURE — 7100000010 HC PHASE II RECOVERY - FIRST 15 MIN

## 2024-10-21 PROCEDURE — 7100000011 HC PHASE II RECOVERY - ADDTL 15 MIN

## 2024-10-21 PROCEDURE — 72146 MRI CHEST SPINE W/O DYE: CPT

## 2024-10-21 RX ORDER — MIDAZOLAM HYDROCHLORIDE 5 MG/ML
INJECTION INTRAMUSCULAR; INTRAVENOUS
Status: DISCONTINUED | OUTPATIENT
Start: 2024-10-21 | End: 2024-10-21 | Stop reason: SDUPTHER

## 2024-10-21 RX ORDER — PROPOFOL 10 MG/ML
INJECTION, EMULSION INTRAVENOUS
Status: DISCONTINUED | OUTPATIENT
Start: 2024-10-21 | End: 2024-10-21 | Stop reason: SDUPTHER

## 2024-10-21 RX ORDER — SODIUM CHLORIDE 9 MG/ML
INJECTION, SOLUTION INTRAVENOUS
Status: DISCONTINUED | OUTPATIENT
Start: 2024-10-21 | End: 2024-10-21 | Stop reason: SDUPTHER

## 2024-10-21 RX ADMIN — MIDAZOLAM 2 MG: 5 INJECTION INTRAMUSCULAR; INTRAVENOUS at 07:36

## 2024-10-21 RX ADMIN — PROPOFOL 30 MG: 10 INJECTION, EMULSION INTRAVENOUS at 07:47

## 2024-10-21 RX ADMIN — PROPOFOL 50 MG: 10 INJECTION, EMULSION INTRAVENOUS at 07:56

## 2024-10-21 RX ADMIN — PROPOFOL 50 MG: 10 INJECTION, EMULSION INTRAVENOUS at 07:45

## 2024-10-21 RX ADMIN — SODIUM CHLORIDE: 9 INJECTION, SOLUTION INTRAVENOUS at 07:30

## 2024-10-21 RX ADMIN — MIDAZOLAM 1 MG: 5 INJECTION INTRAMUSCULAR; INTRAVENOUS at 07:56

## 2024-10-21 RX ADMIN — MIDAZOLAM 1 MG: 5 INJECTION INTRAMUSCULAR; INTRAVENOUS at 07:45

## 2024-10-21 ASSESSMENT — ENCOUNTER SYMPTOMS: DYSPNEA ACTIVITY LEVEL: NO INTERVAL CHANGE

## 2024-10-21 NOTE — ANESTHESIA POSTPROCEDURE EVALUATION
Department of Anesthesiology  Postprocedure Note    Patient: Maciel Hines  MRN: [id number]  YOB: 1955  Date of evaluation: 10/21/2024    Procedure Summary       Date: 10/21/24 Room / Location: [facility] MRI; [facility] Special Procedures; [facility] Radiology    Anesthesia Start: 0730 Anesthesia Stop: 0824    Procedure: MRI THORACIC SPINE WO CONTRAST Diagnosis: Spinal stenosis of lumbar region with neurogenic claudication    Scheduled Providers: Trisha Godinez APRN - CRNA Responsible Provider: Nichol Adair MD    Anesthesia Type: MAC ASA Status: 4            Anesthesia Type: No value filed.    Edgar Phase I:      Edgar Phase II: Edgar Score: 10    Anesthesia Post Evaluation    Patient location during evaluation: PACU  Patient participation: complete - patient participated  Level of consciousness: awake  Airway patency: patent  Nausea & Vomiting: no nausea and no vomiting  Cardiovascular status: hemodynamically stable  Respiratory status: acceptable  Hydration status: euvolemic  Pain management: adequate        No notable events documented.

## 2024-10-21 NOTE — ANESTHESIA PRE PROCEDURE
Component Value Date/Time     09/30/2024 09:55 AM    K 4.3 09/30/2024 09:55 AM    K 3.7 07/13/2023 06:00 AM     09/30/2024 09:55 AM    CO2 24 09/30/2024 09:55 AM    BUN 30 09/30/2024 09:55 AM    CREATININE 1.6 09/30/2024 09:55 AM    GFRAA 37 04/28/2021 10:25 AM    LABGLOM 48 09/30/2024 09:55 AM    LABGLOM 42 11/23/2023 10:00 AM    GLUCOSE 90 09/30/2024 09:55 AM    CALCIUM 9.5 09/30/2024 09:55 AM    BILITOT 0.4 11/23/2023 10:00 AM    ALKPHOS 102 11/23/2023 10:00 AM    AST 17 11/23/2023 10:00 AM    ALT 15 11/23/2023 10:00 AM       POC Tests: No results for input(s): \"POCGLU\", \"POCNA\", \"POCK\", \"POCCL\", \"POCBUN\", \"POCHEMO\", \"POCHCT\" in the last 72 hours.    Coags:   Lab Results   Component Value Date/Time    PROTIME 11.6 07/20/2023 03:23 PM    INR 1.1 07/20/2023 03:23 PM    APTT 27.4 07/11/2023 07:51 PM       HCG (If Applicable): No results found for: \"PREGTESTUR\", \"PREGSERUM\", \"HCG\", \"HCGQUANT\"     ABGs: No results found for: \"PHART\", \"PO2ART\", \"BGQ2YRD\", \"TND1ITJ\", \"BEART\", \"W6GYUDED\"     Type & Screen (If Applicable):  Lab Results   Component Value Date    ABORH A POS 03/13/2023    LABANTI NEG 03/13/2023       Drug/Infectious Status (If Applicable):  No results found for: \"HIV\", \"HEPCAB\"    COVID-19 Screening (If Applicable):   Lab Results   Component Value Date/Time    COVID19 DETECTED 03/24/2023 12:00 AM    COVID19 NOT DETECTED 03/12/2023 03:45 PM           Anesthesia Evaluation  Patient summary reviewed and Nursing notes reviewed   history of anesthetic complications (Prolonged emergence):   Airway: Mallampati: III  TM distance: >3 FB   Neck ROM: limited  Comment: Full beard  Mouth opening: > = 3 FB   Dental:          Pulmonary:   (+)     sleep apnea:   decreased breath sounds: bilateral                              ROS comment: Ran out of trelegy inhaler 1 week ago. No issues breathing per patient.   Cardiovascular:    (+) hypertension:, dysrhythmias: atrial fibrillation, CHF: diastolic, SANDOVAL: no

## 2024-10-21 NOTE — PROCEDURES
0823Patient returned from MRI with anesthesia. Awake and alert, vss. No s/s of complications noted or reported. Vitals will be checked q 15min, see flow sheets.    0845Patient eating and drinking well with no s/s of complications noted or reported.    0855 Discharge papers reviewed with patient, questions answered. Patient taken to door via wheelchair. Patient in stable condition, no s/s of complications noted or reported.

## 2025-04-04 ENCOUNTER — HOSPITAL ENCOUNTER (OUTPATIENT)
Age: 70
Discharge: HOME | End: 2025-04-04
Payer: MEDICARE

## 2025-04-04 LAB
25(OH)D3 SERPL-MCNC: 33.5 NG/ML (ref 30–100)
ALBUMIN SERPL-MCNC: 3.9 G/DL (ref 3.5–5.2)
ALP SERPL-CCNC: 77 U/L (ref 40–129)
ALT SERPL-CCNC: 19 U/L (ref 0–40)
ANION GAP SERPL CALCULATED.3IONS-SCNC: 15 MMOL/L (ref 7–16)
AST SERPL-CCNC: 16 U/L (ref 0–39)
BACTERIA URNS QL MICRO: ABNORMAL
BASOPHILS # BLD: 0.11 K/UL (ref 0–0.2)
BASOPHILS NFR BLD: 1 % (ref 0–2)
BILIRUB SERPL-MCNC: 0.5 MG/DL (ref 0–1.2)
BILIRUB UR QL STRIP: NEGATIVE
BUN SERPL-MCNC: 27 MG/DL (ref 6–23)
CALCIUM SERPL-MCNC: 9.1 MG/DL (ref 8.6–10.2)
CHLORIDE SERPL-SCNC: 108 MMOL/L (ref 98–107)
CLARITY UR: CLEAR
CO2 SERPL-SCNC: 21 MMOL/L (ref 22–29)
COLOR UR: YELLOW
CREAT SERPL-MCNC: 1.8 MG/DL (ref 0.7–1.2)
CREAT UR-MCNC: 177.2 MG/DL (ref 40–278)
EOSINOPHIL # BLD: 0.58 K/UL (ref 0.05–0.5)
EOSINOPHILS RELATIVE PERCENT: 8 % (ref 0–6)
EPI CELLS #/AREA URNS HPF: ABNORMAL /HPF
ERYTHROCYTE [DISTWIDTH] IN BLOOD BY AUTOMATED COUNT: 14.6 % (ref 11.5–15)
GFR, ESTIMATED: 40 ML/MIN/1.73M2
GLUCOSE SERPL-MCNC: 87 MG/DL (ref 74–99)
GLUCOSE UR STRIP-MCNC: NEGATIVE MG/DL
HCT VFR BLD AUTO: 46.9 % (ref 37–54)
HGB BLD-MCNC: 15.2 G/DL (ref 12.5–16.5)
HGB UR QL STRIP.AUTO: ABNORMAL
IMM GRANULOCYTES # BLD AUTO: <0.03 K/UL (ref 0–0.58)
IMM GRANULOCYTES NFR BLD: 0 % (ref 0–5)
KETONES UR STRIP-MCNC: NEGATIVE MG/DL
LEUKOCYTE ESTERASE UR QL STRIP: ABNORMAL
LYMPHOCYTES NFR BLD: 1.3 K/UL (ref 1.5–4)
LYMPHOCYTES RELATIVE PERCENT: 17 % (ref 20–42)
MAGNESIUM SERPL-MCNC: 2.2 MG/DL (ref 1.6–2.6)
MCH RBC QN AUTO: 29.2 PG (ref 26–35)
MCHC RBC AUTO-ENTMCNC: 32.4 G/DL (ref 32–34.5)
MCV RBC AUTO: 90.2 FL (ref 80–99.9)
MICROALBUMIN UR-MCNC: 53 MG/L (ref 0–19)
MICROALBUMIN/CREAT UR-RTO: 30 MCG/MG CREAT (ref 0–30)
MONOCYTES NFR BLD: 0.65 K/UL (ref 0.1–0.95)
MONOCYTES NFR BLD: 9 % (ref 2–12)
NEUTROPHILS NFR BLD: 65 % (ref 43–80)
NEUTS SEG NFR BLD: 5.03 K/UL (ref 1.8–7.3)
NITRITE UR QL STRIP: NEGATIVE
PH UR STRIP: 6 [PH] (ref 5–8)
PHOSPHATE SERPL-MCNC: 3.4 MG/DL (ref 2.5–4.5)
PLATELET # BLD AUTO: 167 K/UL (ref 130–450)
PMV BLD AUTO: 11.6 FL (ref 7–12)
POTASSIUM SERPL-SCNC: 4 MMOL/L (ref 3.5–5)
PROT SERPL-MCNC: 6.6 G/DL (ref 6.4–8.3)
PROT UR STRIP-MCNC: ABNORMAL MG/DL
PTH-INTACT SERPL-MCNC: 78.7 PG/ML (ref 15–65)
RBC # BLD AUTO: 5.2 M/UL (ref 3.8–5.8)
RBC #/AREA URNS HPF: ABNORMAL /HPF
SODIUM SERPL-SCNC: 144 MMOL/L (ref 132–146)
SP GR UR STRIP: 1.02 (ref 1–1.03)
URATE SERPL-MCNC: 6.5 MG/DL (ref 3.4–7)
UROBILINOGEN UR STRIP-ACNC: 0.2 EU/DL (ref 0–1)
WBC #/AREA URNS HPF: ABNORMAL /HPF
WBC OTHER # BLD: 7.7 K/UL (ref 4.5–11.5)

## 2025-04-04 PROCEDURE — 84550 ASSAY OF BLOOD/URIC ACID: CPT

## 2025-04-04 PROCEDURE — 85025 COMPLETE CBC W/AUTO DIFF WBC: CPT

## 2025-04-04 PROCEDURE — 80053 COMPREHEN METABOLIC PANEL: CPT

## 2025-04-04 PROCEDURE — 36415 COLL VENOUS BLD VENIPUNCTURE: CPT

## 2025-04-04 PROCEDURE — 82043 UR ALBUMIN QUANTITATIVE: CPT

## 2025-04-04 PROCEDURE — 83735 ASSAY OF MAGNESIUM: CPT

## 2025-04-04 PROCEDURE — 82306 VITAMIN D 25 HYDROXY: CPT

## 2025-04-04 PROCEDURE — 84100 ASSAY OF PHOSPHORUS: CPT

## 2025-04-04 PROCEDURE — 83970 ASSAY OF PARATHORMONE: CPT

## 2025-04-04 PROCEDURE — 81001 URINALYSIS AUTO W/SCOPE: CPT

## 2025-04-04 PROCEDURE — 82570 ASSAY OF URINE CREATININE: CPT

## (undated) DEVICE — GLOVE ORANGE PI 7 1/2   MSG9075

## (undated) DEVICE — INSTRUMENT DOPPLER PROBE REUSABLE

## (undated) DEVICE — NEEDLE HYPO 25GA L1.5IN BLU POLYPR HUB S STL REG BVL STR

## (undated) DEVICE — GUIDEWIRE VASC STR 0.035 INX180 CM 15 CM BENT PTFE COAT STRT

## (undated) DEVICE — ELECTRODE PT RET AD L9FT HI MOIST COND ADH HYDRGEL CORDED

## (undated) DEVICE — GOWN,AURORA,NONREINF,RAGLAN,L,STERILE: Brand: MEDLINE

## (undated) DEVICE — TRAY MINI VASCULAR REUSABLE

## (undated) DEVICE — BLADE CLIPPER GEN PURP NS

## (undated) DEVICE — Device

## (undated) DEVICE — GAUZE,SPONGE,POST-OP,4X3,STRL,LF: Brand: MEDLINE

## (undated) DEVICE — SURGICAL PROCEDURE PACK VASC MAJ CUST

## (undated) DEVICE — SET SURG INSTR MINI VASC

## (undated) DEVICE — DRAPE,REIN 53X77,STERILE: Brand: MEDLINE

## (undated) DEVICE — CLAMP INSERT: Brand: STEALTH® CLAMP INSERT

## (undated) DEVICE — SUTURE PERMAHAND SZ 4-0 L18IN NONABSORBABLE BLK SILK BRAID A183H

## (undated) DEVICE — BENTSON WIRE GUIDE 20CM DISTAL FLEXIBILITY WITH SOFTENED TIP: Brand: BENTSON

## (undated) DEVICE — 18 GA N.G. KIT, 10 PACK: Brand: SITE-RITE

## (undated) DEVICE — DRAPE,HAND,STERILE: Brand: MEDLINE

## (undated) DEVICE — GOWN,SIRUS,FABRNF,XL,20/CS: Brand: MEDLINE

## (undated) DEVICE — GLOVE SURG SZ 75 L12IN FNGR THK79MIL GRN LTX FREE

## (undated) DEVICE — SCANLAN® VASCU-STATT® SINGLE-USE BULLDOG CLAMP - MIDI ANGLED 45° (WHITE), CLAMPING PRESSURE 25-30 G (2/STERILE PKG): Brand: SCANLAN® VASCU-STATT® SINGLE-USE BULLDOG CLAMP

## (undated) DEVICE — CANNULA INJ L2.5IN BLNT TIP 3MM CLR BODY W/ 1 W VLV DLP

## (undated) DEVICE — DILATOR ART

## (undated) DEVICE — SUTURE PROL SZ 6-0 L30IN NONABSORBABLE BLU L11MM BV 3/8 CIR 8776H

## (undated) DEVICE — DRESSING FOAM W4XL12IN AG SIL ADH ANTIMIC POSTOP OPTIFOAM

## (undated) DEVICE — 18GA (1.3MM OD: 1.0MM ID) X 7CM INTRODUCER18GA X 7CM NEEDLENEEDLE: Brand: INTRODUCER NEEDLEINTRODUCER NEEDLE

## (undated) DEVICE — FEEDING TUBE • RADIOPAQUE: Brand: ARGYLE

## (undated) DEVICE — 5F (1.0MM ID) X 9CM STIFF5F (1.0 MICRO-STICK®INTRODUCER SE WITH NITINOL GUIDEWIREWITH NITIN WITH RADIOPAQUE TIPWITH RADIOPAQ: Brand: MICRO-STICK SETMICRO-STICK SET

## (undated) DEVICE — CATHETER IV 18 GAX1.25 IN WNG INTROCAN SAFETY

## (undated) DEVICE — MARKER,SKIN,WI/RULER AND LABELS: Brand: MEDLINE

## (undated) DEVICE — CATHETER ETER INTROCAN PERIPH IV 1 IN 20G SFTY SHLD MAT PUR

## (undated) DEVICE — TOWEL,OR,DSP,ST,BLUE,STD,6/PK,12PK/CS: Brand: MEDLINE

## (undated) DEVICE — MASTISOL ADHESIVE LIQ 2/3ML

## (undated) DEVICE — LABEL MED 4 IN SURG PANEL W/ PEN STRL

## (undated) DEVICE — BLOCK BITE 60FR RUBBER ADLT DENTAL

## (undated) DEVICE — SUTURE VCRL SZ 2-0 L27IN ABSRB UD L26MM SH 1/2 CIR J417H

## (undated) DEVICE — TESIO: Brand: MEDLINE INDUSTRIES, INC.

## (undated) DEVICE — GLOVE SURG SZ 75 CRM LTX FREE POLYISOPRENE POLYMER BEAD ANTI

## (undated) DEVICE — GOWN,SIRUS,POLYRNF,BRTHSLV,XL,30/CS: Brand: MEDLINE

## (undated) DEVICE — GAUZE,SPONGE,4"X4",16PLY,XRAY,STRL,LF: Brand: MEDLINE

## (undated) DEVICE — BLADE,STAINLESS-STEEL,11,STRL,DISPOSABLE: Brand: MEDLINE

## (undated) DEVICE — ADHESIVE SKIN CLSR 0.7ML TOP DERMBND ADV

## (undated) DEVICE — TUBING SUCT 12FR MAL ALUM SHFT FN CAP VENT UNIV CONN W/ OBT

## (undated) DEVICE — LOOP VES W25MM THK1MM MAXI RED SIL FLD REPELLENT 100 PER

## (undated) DEVICE — CONTROL SYRINGE LUER-LOCK TIP: Brand: MONOJECT

## (undated) DEVICE — GOWN,SIRUS,FABRNF,L,20/CS: Brand: MEDLINE

## (undated) DEVICE — FORCEPS ENDOSCP L230CM WRK CHN 2.8CM SHTH 2.4MM JAW OPN

## (undated) DEVICE — INSTRUMENT PV BULLDOG CLAMPS REUSABLE

## (undated) DEVICE — DRAPE EQUIP BANDED BG 36X28 IN W/ROUNDED CORNER SNAPKOVER

## (undated) DEVICE — DOUBLE BASIN SET: Brand: MEDLINE INDUSTRIES, INC.

## (undated) DEVICE — SOLUTION IV IRRIG WATER 500ML POUR BRL ST 2F7113

## (undated) DEVICE — SOLUTION IV 500ML 0.9% SOD CHL PH 5 INJ USP VIAFLX PLAS

## (undated) DEVICE — SUTURE VCRL SZ 3-0 L27IN ABSRB UD L26MM SH 1/2 CIR J416H

## (undated) DEVICE — 4-PORT MANIFOLD: Brand: NEPTUNE 2

## (undated) DEVICE — SUTURE VCRL P-1 PRECIS PNT REV CUT 3/8 CIR 11MM 18 IN SZ J490G

## (undated) DEVICE — SYRINGE, LUER LOCK, 5ML: Brand: MEDLINE

## (undated) DEVICE — PROBE PV DOPPLER

## (undated) DEVICE — SOLUTION IV IRRIG POUR BRL 0.9% SODIUM CHL 2F7124

## (undated) DEVICE — Device: Brand: DEFENDO VALVE AND CONNECTOR KIT

## (undated) DEVICE — SOLUTION IV 100ML 0.9% SOD CHL PLAS CONT USP VIAFLX 1 PER